# Patient Record
Sex: FEMALE | Race: WHITE | NOT HISPANIC OR LATINO | Employment: FULL TIME | URBAN - METROPOLITAN AREA
[De-identification: names, ages, dates, MRNs, and addresses within clinical notes are randomized per-mention and may not be internally consistent; named-entity substitution may affect disease eponyms.]

---

## 2017-01-27 ENCOUNTER — APPOINTMENT (EMERGENCY)
Dept: RADIOLOGY | Facility: HOSPITAL | Age: 47
End: 2017-01-27
Payer: COMMERCIAL

## 2017-01-27 ENCOUNTER — HOSPITAL ENCOUNTER (EMERGENCY)
Facility: HOSPITAL | Age: 47
Discharge: HOME | End: 2017-01-28
Attending: EMERGENCY MEDICINE | Admitting: EMERGENCY MEDICINE
Payer: COMMERCIAL

## 2017-01-27 DIAGNOSIS — I10 HYPERTENSION: ICD-10-CM

## 2017-01-27 DIAGNOSIS — M54.2 NECK PAIN: Primary | ICD-10-CM

## 2017-01-27 LAB
ALBUMIN SERPL BCP-MCNC: 3.4 G/DL (ref 3.5–5)
ALP SERPL-CCNC: 92 U/L (ref 46–116)
ALT SERPL W P-5'-P-CCNC: 26 U/L (ref 12–78)
ANION GAP SERPL CALCULATED.3IONS-SCNC: 9 MMOL/L (ref 4–13)
AST SERPL W P-5'-P-CCNC: 17 U/L (ref 5–45)
BASOPHILS # BLD AUTO: 0.07 THOUSANDS/ΜL (ref 0–0.1)
BASOPHILS NFR BLD AUTO: 1 % (ref 0–1)
BILIRUB SERPL-MCNC: 0.4 MG/DL (ref 0.2–1)
BUN SERPL-MCNC: 10 MG/DL (ref 5–25)
CALCIUM SERPL-MCNC: 8.7 MG/DL (ref 8.3–10.1)
CHLORIDE SERPL-SCNC: 102 MMOL/L (ref 100–108)
CK SERPL-CCNC: 90 U/L (ref 26–192)
CO2 SERPL-SCNC: 27 MMOL/L (ref 21–32)
CREAT SERPL-MCNC: 0.82 MG/DL (ref 0.6–1.3)
EOSINOPHIL # BLD AUTO: 0.24 THOUSAND/ΜL (ref 0–0.61)
EOSINOPHIL NFR BLD AUTO: 3 % (ref 0–6)
ERYTHROCYTE [DISTWIDTH] IN BLOOD BY AUTOMATED COUNT: 13.2 % (ref 11.6–15.1)
GFR SERPL CREATININE-BSD FRML MDRD: >60 ML/MIN/1.73SQ M
GLUCOSE SERPL-MCNC: 101 MG/DL (ref 65–140)
HCT VFR BLD AUTO: 42.4 % (ref 34.8–46.1)
HGB BLD-MCNC: 14 G/DL (ref 11.5–15.4)
LYMPHOCYTES # BLD AUTO: 2.4 THOUSANDS/ΜL (ref 0.6–4.47)
LYMPHOCYTES NFR BLD AUTO: 28 % (ref 14–44)
MCH RBC QN AUTO: 28.5 PG (ref 26.8–34.3)
MCHC RBC AUTO-ENTMCNC: 33 G/DL (ref 31.4–37.4)
MCV RBC AUTO: 86 FL (ref 82–98)
MONOCYTES # BLD AUTO: 0.91 THOUSAND/ΜL (ref 0.17–1.22)
MONOCYTES NFR BLD AUTO: 10 % (ref 4–12)
NEUTROPHILS # BLD AUTO: 5.09 THOUSANDS/ΜL (ref 1.85–7.62)
NEUTS SEG NFR BLD AUTO: 58 % (ref 43–75)
PLATELET # BLD AUTO: 233 THOUSANDS/UL (ref 149–390)
PMV BLD AUTO: 11.5 FL (ref 8.9–12.7)
POTASSIUM SERPL-SCNC: 3.7 MMOL/L (ref 3.5–5.3)
PROT SERPL-MCNC: 7.7 G/DL (ref 6.4–8.2)
RBC # BLD AUTO: 4.92 MILLION/UL (ref 3.81–5.12)
SODIUM SERPL-SCNC: 138 MMOL/L (ref 136–145)
TROPONIN I SERPL-MCNC: <0.02 NG/ML
WBC # BLD AUTO: 8.71 THOUSAND/UL (ref 4.31–10.16)

## 2017-01-27 PROCEDURE — 80053 COMPREHEN METABOLIC PANEL: CPT | Performed by: EMERGENCY MEDICINE

## 2017-01-27 PROCEDURE — 93005 ELECTROCARDIOGRAM TRACING: CPT | Performed by: EMERGENCY MEDICINE

## 2017-01-27 PROCEDURE — 85025 COMPLETE CBC W/AUTO DIFF WBC: CPT | Performed by: EMERGENCY MEDICINE

## 2017-01-27 PROCEDURE — 71020 HB CHEST X-RAY 2VW FRONTAL&LATL: CPT

## 2017-01-27 PROCEDURE — 72040 X-RAY EXAM NECK SPINE 2-3 VW: CPT

## 2017-01-27 PROCEDURE — 96375 TX/PRO/DX INJ NEW DRUG ADDON: CPT

## 2017-01-27 PROCEDURE — 36415 COLL VENOUS BLD VENIPUNCTURE: CPT | Performed by: EMERGENCY MEDICINE

## 2017-01-27 PROCEDURE — 84702 CHORIONIC GONADOTROPIN TEST: CPT | Performed by: EMERGENCY MEDICINE

## 2017-01-27 PROCEDURE — 82550 ASSAY OF CK (CPK): CPT | Performed by: EMERGENCY MEDICINE

## 2017-01-27 PROCEDURE — 96374 THER/PROPH/DIAG INJ IV PUSH: CPT

## 2017-01-27 PROCEDURE — 84484 ASSAY OF TROPONIN QUANT: CPT | Performed by: EMERGENCY MEDICINE

## 2017-01-27 RX ORDER — LABETALOL HYDROCHLORIDE 5 MG/ML
10 INJECTION, SOLUTION INTRAVENOUS ONCE
Status: COMPLETED | OUTPATIENT
Start: 2017-01-27 | End: 2017-01-27

## 2017-01-27 RX ORDER — ONDANSETRON 2 MG/ML
4 INJECTION INTRAMUSCULAR; INTRAVENOUS ONCE
Status: COMPLETED | OUTPATIENT
Start: 2017-01-27 | End: 2017-01-27

## 2017-01-27 RX ADMIN — HYDROMORPHONE HYDROCHLORIDE 0.5 MG: 1 INJECTION, SOLUTION INTRAMUSCULAR; INTRAVENOUS; SUBCUTANEOUS at 23:18

## 2017-01-27 RX ADMIN — LABETALOL HYDROCHLORIDE 10 MG: 5 INJECTION, SOLUTION INTRAVENOUS at 23:23

## 2017-01-27 RX ADMIN — ONDANSETRON 4 MG: 2 INJECTION INTRAMUSCULAR; INTRAVENOUS at 23:18

## 2017-01-28 VITALS
WEIGHT: 200 LBS | SYSTOLIC BLOOD PRESSURE: 144 MMHG | TEMPERATURE: 98.2 F | DIASTOLIC BLOOD PRESSURE: 82 MMHG | OXYGEN SATURATION: 94 % | HEART RATE: 68 BPM | RESPIRATION RATE: 16 BRPM

## 2017-01-28 LAB — B-HCG SERPL-ACNC: <2 MIU/ML

## 2017-01-28 PROCEDURE — 99284 EMERGENCY DEPT VISIT MOD MDM: CPT

## 2017-01-28 RX ORDER — METOCLOPRAMIDE 10 MG/1
10 TABLET ORAL 4 TIMES DAILY
Qty: 28 TABLET | Refills: 0 | Status: SHIPPED | OUTPATIENT
Start: 2017-01-28 | End: 2017-02-04

## 2017-01-28 RX ORDER — OXYCODONE HYDROCHLORIDE AND ACETAMINOPHEN 5; 325 MG/1; MG/1
1 TABLET ORAL EVERY 4 HOURS PRN
Qty: 20 TABLET | Refills: 0 | Status: SHIPPED | OUTPATIENT
Start: 2017-01-28 | End: 2017-02-07

## 2017-01-28 RX ORDER — AMLODIPINE BESYLATE 2.5 MG/1
2.5 TABLET ORAL DAILY
Qty: 20 TABLET | Refills: 0 | Status: SHIPPED | OUTPATIENT
Start: 2017-01-28 | End: 2020-09-15

## 2017-01-28 RX ORDER — DIAZEPAM 5 MG/1
5 TABLET ORAL EVERY 8 HOURS PRN
Qty: 15 TABLET | Refills: 0 | Status: SHIPPED | OUTPATIENT
Start: 2017-01-28 | End: 2020-09-15 | Stop reason: ALTCHOICE

## 2017-01-30 LAB
ATRIAL RATE: 93 BPM
P AXIS: 69 DEGREES
PR INTERVAL: 144 MS
QRS AXIS: -25 DEGREES
QRSD INTERVAL: 76 MS
QT INTERVAL: 368 MS
QTC INTERVAL: 457 MS
T WAVE AXIS: 44 DEGREES
VENTRICULAR RATE: 93 BPM

## 2018-05-13 ENCOUNTER — HOSPITAL ENCOUNTER (INPATIENT)
Facility: HOSPITAL | Age: 48
LOS: 3 days | Discharge: HOME | DRG: 392 | End: 2018-05-16
Attending: SURGERY | Admitting: SURGERY
Payer: COMMERCIAL

## 2018-05-13 ENCOUNTER — APPOINTMENT (EMERGENCY)
Dept: CT IMAGING | Facility: HOSPITAL | Age: 48
DRG: 392 | End: 2018-05-13
Payer: COMMERCIAL

## 2018-05-13 DIAGNOSIS — K57.20 PERFORATION OF SIGMOID COLON DUE TO DIVERTICULITIS: ICD-10-CM

## 2018-05-13 DIAGNOSIS — R65.10 SIRS (SYSTEMIC INFLAMMATORY RESPONSE SYNDROME) (HCC): Primary | ICD-10-CM

## 2018-05-13 DIAGNOSIS — K57.92 ACUTE DIVERTICULITIS: ICD-10-CM

## 2018-05-13 LAB
ALBUMIN SERPL BCP-MCNC: 3 G/DL (ref 3.5–5)
ALP SERPL-CCNC: 97 U/L (ref 46–116)
ALT SERPL W P-5'-P-CCNC: 27 U/L (ref 12–78)
ANION GAP SERPL CALCULATED.3IONS-SCNC: 8 MMOL/L (ref 4–13)
APTT PPP: 37 SECONDS (ref 23–35)
AST SERPL W P-5'-P-CCNC: 13 U/L (ref 5–45)
BACTERIA UR QL AUTO: ABNORMAL /HPF
BASOPHILS # BLD AUTO: 0.04 THOUSANDS/ΜL (ref 0–0.1)
BASOPHILS NFR BLD AUTO: 0 % (ref 0–1)
BILIRUB SERPL-MCNC: 0.6 MG/DL (ref 0.2–1)
BILIRUB UR QL STRIP: ABNORMAL
BUN SERPL-MCNC: 7 MG/DL (ref 5–25)
CALCIUM SERPL-MCNC: 8.6 MG/DL (ref 8.3–10.1)
CHLORIDE SERPL-SCNC: 102 MMOL/L (ref 100–108)
CLARITY UR: ABNORMAL
CO2 SERPL-SCNC: 27 MMOL/L (ref 21–32)
COLOR UR: YELLOW
CREAT SERPL-MCNC: 0.71 MG/DL (ref 0.6–1.3)
EOSINOPHIL # BLD AUTO: 0.03 THOUSAND/ΜL (ref 0–0.61)
EOSINOPHIL NFR BLD AUTO: 0 % (ref 0–6)
ERYTHROCYTE [DISTWIDTH] IN BLOOD BY AUTOMATED COUNT: 13.9 % (ref 11.6–15.1)
EXT PREG TEST URINE: NEGATIVE
FINE GRAN CASTS URNS QL MICRO: ABNORMAL /LPF
GFR SERPL CREATININE-BSD FRML MDRD: 101 ML/MIN/1.73SQ M
GLUCOSE SERPL-MCNC: 108 MG/DL (ref 65–140)
GLUCOSE UR STRIP-MCNC: NEGATIVE MG/DL
HCT VFR BLD AUTO: 40.8 % (ref 34.8–46.1)
HGB BLD-MCNC: 13.3 G/DL (ref 11.5–15.4)
HGB UR QL STRIP.AUTO: ABNORMAL
INR PPP: 1.06 (ref 0.86–1.16)
KETONES UR STRIP-MCNC: ABNORMAL MG/DL
LACTATE SERPL-SCNC: 0.7 MMOL/L (ref 0.5–2)
LEUKOCYTE ESTERASE UR QL STRIP: NEGATIVE
LIPASE SERPL-CCNC: 57 U/L (ref 73–393)
LYMPHOCYTES # BLD AUTO: 1.33 THOUSANDS/ΜL (ref 0.6–4.47)
LYMPHOCYTES NFR BLD AUTO: 9 % (ref 14–44)
MCH RBC QN AUTO: 27.5 PG (ref 26.8–34.3)
MCHC RBC AUTO-ENTMCNC: 32.6 G/DL (ref 31.4–37.4)
MCV RBC AUTO: 84 FL (ref 82–98)
MONOCYTES # BLD AUTO: 1.22 THOUSAND/ΜL (ref 0.17–1.22)
MONOCYTES NFR BLD AUTO: 8 % (ref 4–12)
MUCOUS THREADS UR QL AUTO: ABNORMAL
NEUTROPHILS # BLD AUTO: 12.51 THOUSANDS/ΜL (ref 1.85–7.62)
NEUTS SEG NFR BLD AUTO: 83 % (ref 43–75)
NITRITE UR QL STRIP: NEGATIVE
NON-SQ EPI CELLS URNS QL MICRO: ABNORMAL /HPF
PH UR STRIP.AUTO: 5.5 [PH] (ref 4.5–8)
PLATELET # BLD AUTO: 246 THOUSANDS/UL (ref 149–390)
PMV BLD AUTO: 10.5 FL (ref 8.9–12.7)
POTASSIUM SERPL-SCNC: 3.2 MMOL/L (ref 3.5–5.3)
PROT SERPL-MCNC: 7.7 G/DL (ref 6.4–8.2)
PROT UR STRIP-MCNC: ABNORMAL MG/DL
PROTHROMBIN TIME: 14.2 SECONDS (ref 12.1–14.4)
RBC # BLD AUTO: 4.84 MILLION/UL (ref 3.81–5.12)
RBC #/AREA URNS AUTO: ABNORMAL /HPF
SODIUM SERPL-SCNC: 137 MMOL/L (ref 136–145)
SP GR UR STRIP.AUTO: 1.02 (ref 1–1.03)
UROBILINOGEN UR QL STRIP.AUTO: 0.2 E.U./DL
WBC # BLD AUTO: 15.13 THOUSAND/UL (ref 4.31–10.16)
WBC #/AREA URNS AUTO: ABNORMAL /HPF

## 2018-05-13 PROCEDURE — 83605 ASSAY OF LACTIC ACID: CPT | Performed by: PHYSICIAN ASSISTANT

## 2018-05-13 PROCEDURE — 85610 PROTHROMBIN TIME: CPT | Performed by: PHYSICIAN ASSISTANT

## 2018-05-13 PROCEDURE — 81025 URINE PREGNANCY TEST: CPT | Performed by: PHYSICIAN ASSISTANT

## 2018-05-13 PROCEDURE — 85025 COMPLETE CBC W/AUTO DIFF WBC: CPT | Performed by: PHYSICIAN ASSISTANT

## 2018-05-13 PROCEDURE — 85730 THROMBOPLASTIN TIME PARTIAL: CPT | Performed by: PHYSICIAN ASSISTANT

## 2018-05-13 PROCEDURE — 99285 EMERGENCY DEPT VISIT HI MDM: CPT

## 2018-05-13 PROCEDURE — 96365 THER/PROPH/DIAG IV INF INIT: CPT

## 2018-05-13 PROCEDURE — 80053 COMPREHEN METABOLIC PANEL: CPT | Performed by: PHYSICIAN ASSISTANT

## 2018-05-13 PROCEDURE — 36415 COLL VENOUS BLD VENIPUNCTURE: CPT | Performed by: PHYSICIAN ASSISTANT

## 2018-05-13 PROCEDURE — 83690 ASSAY OF LIPASE: CPT | Performed by: PHYSICIAN ASSISTANT

## 2018-05-13 PROCEDURE — 81001 URINALYSIS AUTO W/SCOPE: CPT

## 2018-05-13 PROCEDURE — 74177 CT ABD & PELVIS W/CONTRAST: CPT

## 2018-05-13 PROCEDURE — 96361 HYDRATE IV INFUSION ADD-ON: CPT

## 2018-05-13 PROCEDURE — 87040 BLOOD CULTURE FOR BACTERIA: CPT | Performed by: PHYSICIAN ASSISTANT

## 2018-05-13 RX ORDER — NICOTINE 21 MG/24HR
14 PATCH, TRANSDERMAL 24 HOURS TRANSDERMAL DAILY
Status: DISCONTINUED | OUTPATIENT
Start: 2018-05-13 | End: 2018-05-16 | Stop reason: HOSPADM

## 2018-05-13 RX ORDER — POTASSIUM CHLORIDE 20 MEQ/1
40 TABLET, EXTENDED RELEASE ORAL ONCE
Status: COMPLETED | OUTPATIENT
Start: 2018-05-13 | End: 2018-05-13

## 2018-05-13 RX ORDER — LEVOFLOXACIN 5 MG/ML
750 INJECTION, SOLUTION INTRAVENOUS ONCE
Status: COMPLETED | OUTPATIENT
Start: 2018-05-13 | End: 2018-05-13

## 2018-05-13 RX ORDER — SODIUM CHLORIDE 9 MG/ML
125 INJECTION, SOLUTION INTRAVENOUS CONTINUOUS
Status: DISCONTINUED | OUTPATIENT
Start: 2018-05-13 | End: 2018-05-13

## 2018-05-13 RX ORDER — AMLODIPINE BESYLATE 2.5 MG/1
2.5 TABLET ORAL DAILY
Status: DISCONTINUED | OUTPATIENT
Start: 2018-05-13 | End: 2018-05-14

## 2018-05-13 RX ORDER — ONDANSETRON 2 MG/ML
4 INJECTION INTRAMUSCULAR; INTRAVENOUS EVERY 4 HOURS PRN
Status: DISCONTINUED | OUTPATIENT
Start: 2018-05-13 | End: 2018-05-16 | Stop reason: HOSPADM

## 2018-05-13 RX ORDER — MORPHINE SULFATE 4 MG/ML
4 INJECTION, SOLUTION INTRAMUSCULAR; INTRAVENOUS
Status: DISCONTINUED | OUTPATIENT
Start: 2018-05-13 | End: 2018-05-16 | Stop reason: HOSPADM

## 2018-05-13 RX ORDER — SODIUM CHLORIDE, SODIUM LACTATE, POTASSIUM CHLORIDE, CALCIUM CHLORIDE 600; 310; 30; 20 MG/100ML; MG/100ML; MG/100ML; MG/100ML
75 INJECTION, SOLUTION INTRAVENOUS CONTINUOUS
Status: DISCONTINUED | OUTPATIENT
Start: 2018-05-13 | End: 2018-05-16

## 2018-05-13 RX ORDER — ACETAMINOPHEN 325 MG/1
650 TABLET ORAL EVERY 6 HOURS PRN
Status: DISCONTINUED | OUTPATIENT
Start: 2018-05-13 | End: 2018-05-16 | Stop reason: HOSPADM

## 2018-05-13 RX ORDER — LEVOFLOXACIN 5 MG/ML
750 INJECTION, SOLUTION INTRAVENOUS EVERY 24 HOURS
Status: DISCONTINUED | OUTPATIENT
Start: 2018-05-14 | End: 2018-05-16 | Stop reason: HOSPADM

## 2018-05-13 RX ORDER — ONDANSETRON 2 MG/ML
4 INJECTION INTRAMUSCULAR; INTRAVENOUS EVERY 4 HOURS PRN
Status: DISCONTINUED | OUTPATIENT
Start: 2018-05-13 | End: 2018-05-13 | Stop reason: SDUPTHER

## 2018-05-13 RX ADMIN — SODIUM CHLORIDE, SODIUM LACTATE, POTASSIUM CHLORIDE, AND CALCIUM CHLORIDE 125 ML/HR: .6; .31; .03; .02 INJECTION, SOLUTION INTRAVENOUS at 21:44

## 2018-05-13 RX ADMIN — IOHEXOL 100 ML: 350 INJECTION, SOLUTION INTRAVENOUS at 10:45

## 2018-05-13 RX ADMIN — MORPHINE SULFATE 2 MG: 4 INJECTION, SOLUTION INTRAMUSCULAR; INTRAVENOUS at 15:17

## 2018-05-13 RX ADMIN — POTASSIUM CHLORIDE 40 MEQ: 1500 TABLET, EXTENDED RELEASE ORAL at 11:01

## 2018-05-13 RX ADMIN — SODIUM CHLORIDE, SODIUM LACTATE, POTASSIUM CHLORIDE, AND CALCIUM CHLORIDE 125 ML/HR: .6; .31; .03; .02 INJECTION, SOLUTION INTRAVENOUS at 14:31

## 2018-05-13 RX ADMIN — LEVOFLOXACIN 750 MG: 5 INJECTION, SOLUTION INTRAVENOUS at 13:34

## 2018-05-13 RX ADMIN — METRONIDAZOLE 500 MG: 500 INJECTION, SOLUTION INTRAVENOUS at 21:41

## 2018-05-13 RX ADMIN — SODIUM CHLORIDE 1000 ML: 0.9 INJECTION, SOLUTION INTRAVENOUS at 12:14

## 2018-05-13 RX ADMIN — MORPHINE SULFATE 2 MG: 4 INJECTION, SOLUTION INTRAMUSCULAR; INTRAVENOUS at 19:52

## 2018-05-13 RX ADMIN — METRONIDAZOLE 500 MG: 500 INJECTION, SOLUTION INTRAVENOUS at 11:43

## 2018-05-13 RX ADMIN — SODIUM CHLORIDE 1000 ML: 0.9 INJECTION, SOLUTION INTRAVENOUS at 09:54

## 2018-05-13 NOTE — ED PROVIDER NOTES
History  Chief Complaint   Patient presents with    Abdominal Pain     c/o abd pain over a week, lower quads pelvic -No N/V/D- states no relief  Treated for fibroids last year  Pain unrelenting nothing makes it better, taking ibuprofen around the clock - took 1000 mg this AM @7 AM       History provided by:  Patient  Abdominal Pain   Pain location:  Periumbilical  Pain radiation: lower abdomen  Pain severity:  Moderate  Onset quality:  Gradual  Duration:  2 days  Timing:  Constant  Progression:  Waxing and waning  Chronicity:  New  Context: not alcohol use, not awakening from sleep, not diet changes, not eating, not laxative use, not medication withdrawal, not previous surgeries, not recent illness, not recent sexual activity, not recent travel, not retching, not sick contacts, not suspicious food intake and not trauma    Relieved by:  None tried  Worsened by: Movement, palpation and position changes  Ineffective treatments:  None tried  Associated symptoms: anorexia and chills    Associated symptoms: no belching, no chest pain, no constipation, no cough, no diarrhea, no dysuria, no fatigue, no fever, no flatus, no hematemesis, no hematochezia, no hematuria, no melena, no nausea, no shortness of breath, no sore throat, no vaginal bleeding, no vaginal discharge and no vomiting    Risk factors: obesity    Risk factors: no alcohol abuse, no aspirin use, not elderly, has not had multiple surgeries, no NSAID use, not pregnant and no recent hospitalization        Prior to Admission Medications   Prescriptions Last Dose Informant Patient Reported? Taking?    amLODIPine (NORVASC) 2 5 mg tablet   No Yes   Sig: Take 1 tablet by mouth daily for 20 days   diazepam (VALIUM) 5 mg tablet   No Yes   Sig: Take 1 tablet by mouth every 8 (eight) hours as needed for muscle spasms for up to 10 days      Facility-Administered Medications: None       Past Medical History:   Diagnosis Date    Fibroids     Hypertension        Past Surgical History:   Procedure Laterality Date    TUBAL LIGATION  1991       History reviewed  No pertinent family history  I have reviewed and agree with the history as documented  Social History   Substance Use Topics    Smoking status: Current Every Day Smoker    Smokeless tobacco: Never Used    Alcohol use Yes      Comment: socially        Review of Systems   Constitutional: Positive for activity change, appetite change and chills  Negative for diaphoresis, fatigue and fever  HENT: Negative for congestion, dental problem, ear discharge, facial swelling, hearing loss, postnasal drip, rhinorrhea, sinus pain, sinus pressure, sore throat and trouble swallowing  Eyes: Negative for pain, discharge, redness and itching  Respiratory: Negative for cough, chest tightness and shortness of breath  Cardiovascular: Negative for chest pain  Gastrointestinal: Positive for abdominal pain and anorexia  Negative for constipation, diarrhea, flatus, hematemesis, hematochezia, melena, nausea and vomiting  Endocrine: Negative for cold intolerance, heat intolerance, polydipsia, polyphagia and polyuria  Genitourinary: Negative for difficulty urinating, dysuria, frequency, hematuria, urgency, vaginal bleeding and vaginal discharge  Musculoskeletal: Negative for arthralgias, back pain, gait problem, joint swelling, myalgias, neck pain and neck stiffness  Skin: Negative for color change, pallor, rash and wound  Neurological: Negative for weakness and headaches  Hematological: Negative for adenopathy  Does not bruise/bleed easily  Psychiatric/Behavioral: Negative for confusion         Physical Exam  ED Triage Vitals [05/13/18 0849]   Temperature Pulse Respirations Blood Pressure SpO2   98 7 °F (37 1 °C) 95 20 153/74 98 %      Temp Source Heart Rate Source Patient Position - Orthostatic VS BP Location FiO2 (%)   Oral Monitor Sitting Right arm --      Pain Score       9           Orthostatic Vital Signs  Vitals:    05/13/18 0849 05/13/18 1052 05/13/18 1248 05/13/18 1324   BP: 153/74 111/56 152/81 139/79   Pulse: 95 92 90 88   Patient Position - Orthostatic VS: Sitting Lying Sitting Sitting       Physical Exam   Constitutional: She is oriented to person, place, and time  She appears well-developed and well-nourished  No distress  HENT:   Head: Normocephalic  Right Ear: External ear normal    Left Ear: External ear normal    Nose: Nose normal    Mouth/Throat: Oropharynx is clear and moist    Eyes: Conjunctivae are normal  Right eye exhibits no discharge  Left eye exhibits no discharge  Neck: Neck supple  No JVD present  No tracheal deviation present  No thyromegaly present  Cardiovascular: Normal rate, regular rhythm, normal heart sounds and intact distal pulses  Exam reveals no gallop and no friction rub  No murmur heard  Pulmonary/Chest: Effort normal and breath sounds normal  No stridor  No respiratory distress  She has no wheezes  She has no rales  She exhibits no tenderness  Abdominal: There is tenderness  There is guarding  There is no rebound  No hernia  Musculoskeletal: She exhibits no edema or tenderness  Lymphadenopathy:     She has no cervical adenopathy  Neurological: She is alert and oriented to person, place, and time  Skin: Skin is warm  Capillary refill takes less than 2 seconds  No rash noted  She is not diaphoretic  No erythema  Psychiatric: She has a normal mood and affect  Her behavior is normal  Judgment and thought content normal    Nursing note and vitals reviewed        ED Medications  Medications   amLODIPine (NORVASC) tablet 2 5 mg (2 5 mg Oral Not Given 5/13/18 1421)   lactated ringers infusion (125 mL/hr Intravenous New Bag 5/13/18 1431)   ondansetron (ZOFRAN) injection 4 mg (not administered)   enoxaparin (LOVENOX) subcutaneous injection 40 mg (40 mg Subcutaneous Not Given 5/13/18 1422)   levofloxacin (LEVAQUIN) IVPB (premix) 750 mg (not administered) metroNIDAZOLE (FLAGYL) IVPB (premix) 500 mg ( Intravenous Canceled Entry 5/13/18 1422)   morphine (PF) 4 mg/mL injection 4 mg (not administered)   acetaminophen (TYLENOL) tablet 650 mg (not administered)   nicotine (NICODERM CQ) 14 mg/24hr TD 24 hr patch 14 mg (14 mg Transdermal Not Given 5/13/18 1224)   sodium chloride 0 9 % bolus 1,000 mL (0 mL Intravenous Stopped 5/13/18 1213)   iohexol (OMNIPAQUE) 350 MG/ML injection (MULTI-DOSE) 100 mL (100 mL Intravenous Given 5/13/18 1045)   potassium chloride (K-DUR,KLOR-CON) CR tablet 40 mEq (40 mEq Oral Given 5/13/18 1101)   sodium chloride 0 9 % bolus 1,000 mL (1,000 mL Intravenous New Bag 5/13/18 1214)   levofloxacin (LEVAQUIN) IVPB (premix) 750 mg (750 mg Intravenous New Bag 5/13/18 1334)   metroNIDAZOLE (FLAGYL) IVPB (premix) 500 mg (500 mg Intravenous New Bag 5/13/18 1143)       Diagnostic Studies  Results Reviewed     Procedure Component Value Units Date/Time    Blood culture [[id number]] Collected:  05/13/18 1119    Lab Status: In process Specimen:  Blood from Arm, Left Updated:  05/13/18 1140    Blood culture [[id number]] Collected:  05/13/18 0955    Lab Status:   In process Specimen:  Blood from Arm, Right Updated:  05/13/18 1140    Urine Microscopic [[id number]]  (Abnormal) Collected:  05/13/18 1003    Lab Status:  Final result Specimen:  Urine from Urine, Clean Catch Updated:  05/13/18 1037     RBC, UA 10-20 (A) /hpf      WBC, UA 1-2 (A) /hpf      Epithelial Cells Occasional /hpf      Bacteria, UA Moderate (A) /hpf      Fine granular casts 2-3 /lpf      MUCOUS THREADS Occasional (A)    Protime-INR [[id number]]  (Normal) Collected:  05/13/18 0954    Lab Status:  Final result Specimen:  Blood from Arm, Right Updated:  05/13/18 1035     Protime 14 2 seconds      INR 1 06    APTT [[id number]]  (Abnormal) Collected:  05/13/18 0954    Lab Status:  Final result Specimen:  Blood from Arm, Right Updated:  05/13/18 1035     PTT 37 (H) seconds     Comprehensive metabolic panel [[id number]]  (Abnormal) Collected:  05/13/18 0954    Lab Status:  Final result Specimen:  Blood from Arm, Right Updated:  05/13/18 1031     Sodium 137 mmol/L      Potassium 3 2 (L) mmol/L      Chloride 102 mmol/L      CO2 27 mmol/L      Anion Gap 8 mmol/L      BUN 7 mg/dL      Creatinine 0 71 mg/dL      Glucose 108 mg/dL      Calcium 8 6 mg/dL      AST 13 U/L      ALT 27 U/L      Alkaline Phosphatase 97 U/L      Total Protein 7 7 g/dL      Albumin 3 0 (L) g/dL      Total Bilirubin 0 60 mg/dL      eGFR 101 ml/min/1 73sq m     Narrative:         National Kidney Disease Education Program recommendations are as follows:  GFR calculation is accurate only with a steady state creatinine  Chronic Kidney disease less than 60 ml/min/1 73 sq  meters  Kidney failure less than 15 ml/min/1 73 sq  meters  Lipase [[id number]]  (Abnormal) Collected:  05/13/18 0954    Lab Status:  Final result Specimen:  Blood from Arm, Right Updated:  05/13/18 1031     Lipase 57 (L) u/L     Lactic acid, plasma [[id number]]  (Normal) Collected:  05/13/18 0954    Lab Status:  Final result Specimen:  Blood from Arm, Right Updated:  05/13/18 1024     LACTIC ACID 0 7 mmol/L     Narrative:         Result may be elevated if tourniquet was used during collection      CBC and differential [[id number]]  (Abnormal) Collected:  05/13/18 0954    Lab Status:  Final result Specimen:  Blood from Arm, Right Updated:  05/13/18 1005     WBC 15 13 (H) Thousand/uL      RBC 4 84 Million/uL      Hemoglobin 13 3 g/dL      Hematocrit 40 8 %      MCV 84 fL      MCH 27 5 pg      MCHC 32 6 g/dL      RDW 13 9 %      MPV 10 5 fL      Platelets 948 Thousands/uL      Neutrophils Relative 83 (H) %      Lymphocytes Relative 9 (L) %      Monocytes Relative 8 %      Eosinophils Relative 0 %      Basophils Relative 0 %      Neutrophils Absolute 12 51 (H) Thousands/µL      Lymphocytes Absolute 1 33 Thousands/µL      Monocytes Absolute 1 22 Thousand/µL      Eosinophils Absolute 0 03 Thousand/µL      Basophils Absolute 0 04 Thousands/µL     POCT pregnancy, urine [[id number]]  (Normal) Resulted:  05/13/18 1003    Lab Status:  Final result Updated:  05/13/18 1003     EXT PREG TEST UR (Ref: Negative) negative    ED Urine Macroscopic [[id number]]  (Abnormal) Collected:  05/13/18 1003    Lab Status:  Final result Specimen:  Urine Updated:  05/13/18 1001     Color, UA Yellow     Clarity, UA Slightly Cloudy     pH, UA 5 5     Leukocytes, UA Negative     Nitrite, UA Negative     Protein, UA 30 (1+) (A) mg/dl      Glucose, UA Negative mg/dl      Ketones, UA >=160 (4+) (A) mg/dl      Urobilinogen, UA 0 2 E U /dl      Bilirubin, UA Interference- unable to analyze (A)     Blood, UA Large (A)     Specific [place], UA 1 025    Narrative:       CLINITEK RESULT                 CT abdomen pelvis with contrast   Final Result by Xenia Santiago MD (05/13 1102)      Sigmoid diverticulitis with extraluminal air in the area of inflammation, consistent with perforation  There is no fluid collection  I personally discussed this study with Zak Velasco on 5/13/2018 at 11:02 AM                Workstation performed: [id number]                    Procedures  Procedures       Phone Contacts  ED Phone Contact    ED Course  ED Course as of May 13 1505   Sun May 13, 2018   1105 Spoke to Radiologist-CT demonstrated perforated diverticulitis  No fluid collection  Initial Sepsis Screening     Row Name 05/13/18 1043                Is the patient's history suggestive of a new or worsening infection? (!)  Yes (Proceed)  -JG        Suspected source of infection acute abdominal infection  -JG        Are two or more of the following signs & symptoms of infection both present and new to the patient?  (!)  Yes (Proceed)  -JG        Indicate SIRS criteria Tachycardia > 90 bpm;Leukocytosis (WBC > 51955 IJL)  -JG        If the answer is yes to both questions, suspicion of sepsis is present  [employer] If severe sepsis is present AND tissue hypoperfusion perists in the hour after fluid resuscitation or lactate > 4, the patient meets criteria for SEPTIC SHOCK          Are any of the following organ dysfunction criteria present within 6 hours of suspected infection and SIRS criteria that are NOT considered to be chronic conditions? No  -JG        Organ dysfunction          Date of presentation of severe sepsis          Time of presentation of severe sepsis          Tissue hypoperfusion persists in the hour after crystalloid fluid administration, evidenced, by either:          Was hypotension present within one hour of the conclusion of crystalloid fluid administration?         Date of presentation of septic shock          Time of presentation of septic shock            User Key  (r) = Recorded By, (t) = Taken By, (c) = Cosigned By    [address] Name Provider Type    [address] Gandara PA-C Physician Assistant                  MDM  Number of Diagnoses or Management Options  Acute diverticulitis: new and requires workup  [facility]) [facility] new and requires workup     Amount and/or Complexity of Data Reviewed  Clinical lab tests: ordered and reviewed  Tests in the radiology section of CPT®: ordered and reviewed  Tests in the medicine section of CPT®: ordered and reviewed    Risk of Complications, Morbidity, and/or Mortality  Presenting problems: high  Diagnostic procedures: high  Management options: high  General comments: Patient presents emergency room with complaints of a abdominal pain and cramping  She was seen and evaluated  She had tenderness and guarding diffusely throughout her abdomen  Patient had a 15 a 1000 white count  Her lactic was 0 7  She met SIRS criteria but not acute sepsis  She had a CT scan that demonstrated diverticulitis with a micro perforation  She was admitted to Dr Alexandru Manriquez  service from surgery      Patient Progress  Patient progress: stable    CritCare Time    Disposition  Final diagnoses:   SIRS (systemic inflammatory response syndrome) (HCC)   Acute diverticulitis - Micro perforation     Time reflects when diagnosis was documented in both MDM as applicable and the Disposition within this note     Time User Action Codes Description Comment    5/13/2018 11:05 AM Minor Neigh Add [R65 10] SIRS (systemic inflammatory response syndrome) ([facility] )     5/13/2018 12:01 PM Minor Neigh Add [K57 92] Acute diverticulitis     5/13/2018 12:01 PM Minor Neigh Modify [K57 92] Acute diverticulitis Micro perforation      ED Disposition     ED Disposition Condition Comment    Admit  Case was discussed with Dr Kristen Blanco and the patient's admission status was agreed to be Admission Status: inpatient status to the service of Dr Kristen Blanco   Follow-up Information    None       Current Discharge Medication List      CONTINUE these medications which have NOT CHANGED    Details   amLODIPine (NORVASC) 2 5 mg tablet Take 1 tablet by mouth daily for 20 days  Qty: 20 tablet, Refills: 0      diazepam (VALIUM) 5 mg tablet Take 1 tablet by mouth every 8 (eight) hours as needed for muscle spasms for up to 10 days  Qty: 15 tablet, Refills: 0           No discharge procedures on file      ED Provider  Electronically Signed by           Kev Landis PA-C  05/13/18 2420

## 2018-05-13 NOTE — SEPSIS NOTE
Sepsis Note   Madison Height 50 y o  female MRN: [id number]  Unit/Bed#: ED 11 Encounter: [id number]            Initial Sepsis Screening     Row Name 05/13/18 1043                Is the patient's history suggestive of a new or worsening infection? (!)  Yes (Proceed)  -JG        Suspected source of infection acute abdominal infection  -JG        Are two or more of the following signs & symptoms of infection both present and new to the patient? (!)  Yes (Proceed)  -JG        Indicate SIRS criteria Tachycardia > 90 bpm;Leukocytosis (WBC > 86281 IJL)  -JG        If the answer is yes to both questions, suspicion of sepsis is present          If severe sepsis is present AND tissue hypoperfusion perists in the hour after fluid resuscitation or lactate > 4, the patient meets criteria for SEPTIC SHOCK          Are any of the following organ dysfunction criteria present within 6 hours of suspected infection and SIRS criteria that are NOT considered to be chronic conditions? No  -JG        Organ dysfunction          Date of presentation of severe sepsis          Time of presentation of severe sepsis          Tissue hypoperfusion persists in the hour after crystalloid fluid administration, evidenced, by either:          Was hypotension present within one hour of the conclusion of crystalloid fluid administration?           Date of presentation of septic shock          Time of presentation of septic shock            User Key  (r) = Recorded By, (t) = Taken By, (c) = Cosigned By    [address] Name Provider Type    [address] Gandara PA-C Physician Assistant

## 2018-05-14 PROBLEM — K57.20 PERFORATION OF SIGMOID COLON DUE TO DIVERTICULITIS: Status: ACTIVE | Noted: 2018-05-14

## 2018-05-14 LAB
ANION GAP SERPL CALCULATED.3IONS-SCNC: 9 MMOL/L (ref 4–13)
BASOPHILS # BLD AUTO: 0.02 THOUSANDS/ΜL (ref 0–0.1)
BASOPHILS NFR BLD AUTO: 0 % (ref 0–1)
BUN SERPL-MCNC: 5 MG/DL (ref 5–25)
CALCIUM SERPL-MCNC: 7.9 MG/DL (ref 8.3–10.1)
CHLORIDE SERPL-SCNC: 104 MMOL/L (ref 100–108)
CO2 SERPL-SCNC: 24 MMOL/L (ref 21–32)
CREAT SERPL-MCNC: 0.58 MG/DL (ref 0.6–1.3)
EOSINOPHIL # BLD AUTO: 0.03 THOUSAND/ΜL (ref 0–0.61)
EOSINOPHIL NFR BLD AUTO: 0 % (ref 0–6)
ERYTHROCYTE [DISTWIDTH] IN BLOOD BY AUTOMATED COUNT: 13.9 % (ref 11.6–15.1)
GFR SERPL CREATININE-BSD FRML MDRD: 109 ML/MIN/1.73SQ M
GLUCOSE SERPL-MCNC: 84 MG/DL (ref 65–140)
HCT VFR BLD AUTO: 36.8 % (ref 34.8–46.1)
HGB BLD-MCNC: 12.1 G/DL (ref 11.5–15.4)
LYMPHOCYTES # BLD AUTO: 1.41 THOUSANDS/ΜL (ref 0.6–4.47)
LYMPHOCYTES NFR BLD AUTO: 12 % (ref 14–44)
MCH RBC QN AUTO: 27.9 PG (ref 26.8–34.3)
MCHC RBC AUTO-ENTMCNC: 32.9 G/DL (ref 31.4–37.4)
MCV RBC AUTO: 85 FL (ref 82–98)
MONOCYTES # BLD AUTO: 1.17 THOUSAND/ΜL (ref 0.17–1.22)
MONOCYTES NFR BLD AUTO: 10 % (ref 4–12)
NEUTROPHILS # BLD AUTO: 9.66 THOUSANDS/ΜL (ref 1.85–7.62)
NEUTS SEG NFR BLD AUTO: 78 % (ref 43–75)
PLATELET # BLD AUTO: 251 THOUSANDS/UL (ref 149–390)
PMV BLD AUTO: 10.7 FL (ref 8.9–12.7)
POTASSIUM SERPL-SCNC: 3.6 MMOL/L (ref 3.5–5.3)
RBC # BLD AUTO: 4.34 MILLION/UL (ref 3.81–5.12)
SODIUM SERPL-SCNC: 137 MMOL/L (ref 136–145)
WBC # BLD AUTO: 12.29 THOUSAND/UL (ref 4.31–10.16)

## 2018-05-14 PROCEDURE — 85025 COMPLETE CBC W/AUTO DIFF WBC: CPT | Performed by: SURGERY

## 2018-05-14 PROCEDURE — 80048 BASIC METABOLIC PNL TOTAL CA: CPT | Performed by: SURGERY

## 2018-05-14 RX ORDER — AMLODIPINE BESYLATE 5 MG/1
5 TABLET ORAL DAILY
Status: DISCONTINUED | OUTPATIENT
Start: 2018-05-14 | End: 2018-05-16 | Stop reason: HOSPADM

## 2018-05-14 RX ORDER — AMLODIPINE BESYLATE 5 MG/1
5 TABLET ORAL DAILY
Status: DISCONTINUED | OUTPATIENT
Start: 2018-05-15 | End: 2018-05-14

## 2018-05-14 RX ADMIN — SODIUM CHLORIDE, SODIUM LACTATE, POTASSIUM CHLORIDE, AND CALCIUM CHLORIDE 125 ML/HR: .6; .31; .03; .02 INJECTION, SOLUTION INTRAVENOUS at 23:25

## 2018-05-14 RX ADMIN — SODIUM CHLORIDE, SODIUM LACTATE, POTASSIUM CHLORIDE, AND CALCIUM CHLORIDE 125 ML/HR: .6; .31; .03; .02 INJECTION, SOLUTION INTRAVENOUS at 06:12

## 2018-05-14 RX ADMIN — LEVOFLOXACIN 750 MG: 5 INJECTION, SOLUTION INTRAVENOUS at 12:04

## 2018-05-14 RX ADMIN — METRONIDAZOLE 500 MG: 500 INJECTION, SOLUTION INTRAVENOUS at 21:07

## 2018-05-14 RX ADMIN — METRONIDAZOLE 500 MG: 500 INJECTION, SOLUTION INTRAVENOUS at 04:31

## 2018-05-14 RX ADMIN — AMLODIPINE BESYLATE 5 MG: 5 TABLET ORAL at 09:30

## 2018-05-14 RX ADMIN — ONDANSETRON 4 MG: 2 INJECTION INTRAMUSCULAR; INTRAVENOUS at 16:52

## 2018-05-14 RX ADMIN — METRONIDAZOLE 500 MG: 500 INJECTION, SOLUTION INTRAVENOUS at 12:54

## 2018-05-14 RX ADMIN — MORPHINE SULFATE 4 MG: 4 INJECTION, SOLUTION INTRAMUSCULAR; INTRAVENOUS at 00:13

## 2018-05-14 RX ADMIN — MORPHINE SULFATE 2 MG: 4 INJECTION, SOLUTION INTRAMUSCULAR; INTRAVENOUS at 16:52

## 2018-05-14 RX ADMIN — MORPHINE SULFATE 2 MG: 4 INJECTION, SOLUTION INTRAMUSCULAR; INTRAVENOUS at 10:38

## 2018-05-14 RX ADMIN — ENOXAPARIN SODIUM 40 MG: 40 INJECTION SUBCUTANEOUS at 09:03

## 2018-05-14 RX ADMIN — SODIUM CHLORIDE, SODIUM LACTATE, POTASSIUM CHLORIDE, AND CALCIUM CHLORIDE 125 ML/HR: .6; .31; .03; .02 INJECTION, SOLUTION INTRAVENOUS at 15:38

## 2018-05-14 NOTE — H&P
History and Physical - General Surgery  Keshav Estrada 50 y o  female MRN: [id number]  Unit/Bed#: -01 Encounter: [id number]        Assessment/Plan     Assessment:  50 F with acute sigmoid diverticulitis with perforation    Plan:  Trial of clears today  Maintenance IV fluids - D5 1/2 NS @ 100  Replete electrolytes  Continue Levaquin/Flagyl  Serial abdominal exams  OOB and ambulate  Lovenox ppx    History of Present Illness     HPI:  Keshav Estrada is a 50 y o  female who presents with epigastric abdominal pain  She states that it started a few days ago  It was intermittent in nature, but gradually worsened  She was prompted to come to the emergency department by her   She denies any nausea or vomiting  She does report subjective fevers in the evening  She states she has had normal bowel movements  In the ED she underwent a CT scan which showed acute sigmoid diverticulitis with perforation and no collection  She is otherwise relatively healthy, and takes amlodipine for hypertension  She has never had a colonoscopy  Her family history is significant for her mother with diverticulitis for which she underwent some sort of colon resection  Review of Systems   Constitutional: Positive for fever  Negative for activity change, appetite change and chills  HENT: Negative  Eyes: Negative  Respiratory: Negative  Cardiovascular: Negative  Gastrointestinal: Positive for abdominal distention and abdominal pain  Negative for blood in stool, constipation, diarrhea, nausea and vomiting  Endocrine: Negative  Genitourinary: Negative  Musculoskeletal: Negative  Skin: Negative  Allergic/Immunologic: Negative  Hematological: Negative  Psychiatric/Behavioral: Negative          Historical Information   Past Medical History:   Diagnosis Date    Fibroids     Hypertension      Past Surgical History:   Procedure Laterality Date    TUBAL LIGATION  1991     Social History   History Alcohol Use    Yes     Comment: socially     History   Drug Use No     History   Smoking Status    Current Every Day Smoker   Smokeless Tobacco    Never Used     Family History: History reviewed  No pertinent family history  Meds/Allergies   PTA meds:   Prior to Admission Medications   Prescriptions Last Dose Informant Patient Reported? Taking? amLODIPine (NORVASC) 2 5 mg tablet   No Yes   Sig: Take 1 tablet by mouth daily for 20 days   diazepam (VALIUM) 5 mg tablet   No Yes   Sig: Take 1 tablet by mouth every 8 (eight) hours as needed for muscle spasms for up to 10 days      Facility-Administered Medications: None     No Known Allergies    Objective   First Vitals:   Blood Pressure: 153/74 (05/13/18 0849)  Pulse: 95 (05/13/18 0849)  Temperature: 98 7 °F (37 1 °C) (05/13/18 0849)  Temp Source: Oral (05/13/18 0849)  Respirations: 20 (05/13/18 0849)  Height: 4' 9" (144 8 cm) (05/13/18 1052)  Weight - Scale: 92 3 kg (203 lb 7 8 oz) (05/13/18 1052)  SpO2: 98 % (05/13/18 0849)    Current Vitals:   Blood Pressure: 132/76 (05/13/18 2348)  Pulse: 97 (05/13/18 2348)  Temperature: 100 4 °F (38 °C) (05/13/18 2348)  Temp Source: Oral (05/13/18 2348)  Respirations: 18 (05/13/18 2348)  Height: 4' 9" (144 8 cm) (05/13/18 1052)  Weight - Scale: 92 3 kg (203 lb 7 8 oz) (05/13/18 1052)  SpO2: 92 % (05/13/18 2348)      Intake/Output Summary (Last 24 hours) at 05/14/18 0758  Last data filed at 05/13/18 6534   Gross per 24 hour   Intake          1902 08 ml   Output                0 ml   Net          1902 08 ml       Invasive Devices     Peripheral Intravenous Line            Peripheral IV 05/13/18 Right Antecubital less than 1 day                Physical Exam   Constitutional: She is oriented to person, place, and time  She appears well-developed and well-nourished  HENT:   Head: Normocephalic  Eyes: Pupils are equal, round, and reactive to light  Neck: Normal range of motion     Cardiovascular: Normal rate and regular rhythm  Pulmonary/Chest: Effort normal  No respiratory distress  She exhibits no tenderness  Abdominal:   Soft, moderately tender, LLQ, LUQ, epigastrium  No rebound, voluntary guarding in LLQ   Musculoskeletal: Normal range of motion  She exhibits no tenderness or deformity  Neurological: She is alert and oriented to person, place, and time  Skin: Skin is warm and dry  Psychiatric: She has a normal mood and affect  Her behavior is normal        Lab Results:   CBC:   Lab Results   Component Value Date    WBC 12 29 (H) 05/14/2018    HGB 12 1 05/14/2018    HCT 36 8 05/14/2018    MCV 85 05/14/2018     05/14/2018    MCH 27 9 05/14/2018    MCHC 32 9 05/14/2018    RDW 13 9 05/14/2018    MPV 10 7 05/14/2018   , CMP:   Lab Results   Component Value Date     05/14/2018    K 3 6 05/14/2018     05/14/2018    CO2 24 05/14/2018    ANIONGAP 9 05/14/2018    BUN 5 05/14/2018    CREATININE 0 58 (L) 05/14/2018    GLUCOSE 84 05/14/2018    CALCIUM 7 9 (L) 05/14/2018    AST 13 05/13/2018    ALT 27 05/13/2018    ALKPHOS 97 05/13/2018    PROT 7 7 05/13/2018    BILITOT 0 60 05/13/2018    EGFR 109 05/14/2018     Imaging: I have personally reviewed pertinent reports  EKG, Pathology, and Other Studies: I have personally reviewed pertinent reports        Code Status: No Order  Advance Directive and Living Will:      Power of :    POLST:

## 2018-05-14 NOTE — PLAN OF CARE
Problem: DISCHARGE PLANNING - CARE MANAGEMENT  Goal: Discharge to post-acute care or home with appropriate resources  INTERVENTIONS:  - Conduct assessment to determine patient/family and health care team treatment goals, and need for post-acute services based on payer coverage, community resources, and patient preferences, and barriers to discharge  - Address psychosocial, clinical, and financial barriers to discharge as identified in assessment in conjunction with the patient/family and health care team  - Arrange appropriate level of post-acute services according to patients   needs and preference and payer coverage in collaboration with the physician and health care team  - Communicate with and update the patient/family, physician, and health care team regarding progress on the discharge plan  - Arrange appropriate transportation to post-acute venues  Outcome: Progressing  CM met with Pt at bedside  Pt lives home with her  and grandkids  Pt lives in a 1 floor home with 1 MARY  Pt does not have history of DME, HHC, or Rehab  Pt is independent with ambulation and ADL's  Pt uses [facility] [address] and has Rx coverage  Pt denies MH or D&A history  Pt does not have a POA and does not want any information  Pt drives herself to appointments  CM name and role reviewed and Discharge Checklist provided  Encouraged patient and caregiver to review prior to discharge  CM reviewed d/c planning process including the following: identifying help at home, patient preference for d/c planning needs, [facility] Meds to Bed program, availability of treatment team to discuss questions or concerns patient and/or family may have regarding understanding medications and recognizing signs and symptoms once discharged  CM also encouraged patient to follow up with all recommended appointments after discharge  Patient advised of importance for patient and family to participate in managing patients medical well being

## 2018-05-14 NOTE — PLAN OF CARE
DISCHARGE PLANNING - CARE MANAGEMENT     Discharge to post-acute care or home with appropriate resources Progressing        Nutrition/Hydration-ADULT     Nutrient/Hydration intake appropriate for improving, restoring or maintaining nutritional needs Progressing        PAIN - ADULT     Verbalizes/displays adequate comfort level or baseline comfort level Progressing        Potential for Falls     Patient will remain free of falls Progressing

## 2018-05-14 NOTE — CASE MANAGEMENT
Initial Clinical Review    Admission: Date/Time/Statement: 5/13/18 @ 1202 Inpatient Written     Orders Placed This Encounter   Procedures    Inpatient Admission     Standing Status:   Standing     Number of Occurrences:   1     Order Specific Question:   Admitting Physician     Answer:   James Morrison [[id number]]     Order Specific Question:   Level of Care     Answer:   Med Surg [[id number]]     Order Specific Question:   Estimated length of stay     Answer:   More than 2 Midnights     Order Specific Question:   Certification     Answer:   I certify that inpatient services are medically necessary for this patient for a duration of greater than two midnights  See H&P and MD Progress Notes for additional information about the patient's course of treatment  ED: Date/Time/Mode of Arrival:   ED Arrival Information     Expected Arrival Acuity Means of Arrival Escorted By Service Admission Type    - 5/13/2018 08:37 Urgent Walk-In Family Member Surgery-General Urgent    Arrival Complaint    abdominal pain          Chief Complaint:   Chief Complaint   Patient presents with    Abdominal Pain     c/o abd pain over a week, lower quads pelvic -No N/V/D- states no relief  Treated for fibroids last year  Pain unrelenting nothing makes it better, taking ibuprofen around the clock - took 1000 mg this AM @7 AM       History of Illness: Inez Mcgregor is a 50 y o  female who presents with epigastric abdominal pain  She states that it started a few days ago  It was intermittent in nature, but gradually worsened  She was prompted to come to the emergency department by her   She does report subjective fevers in the evening  She states she has had normal bowel movements  In the ED she underwent a CT scan which showed acute sigmoid diverticulitis with perforation and no collection  She is otherwise relatively healthy, and takes amlodipine for hypertension  She has never had a colonoscopy    Her family history is significant for her mother with diverticulitis for which she underwent some sort of colon resection  ED Vital Signs:   ED Triage Vitals [05/13/18 0849]   Temperature Pulse Respirations Blood Pressure SpO2   98 7 °F (37 1 °C) 95 20 153/74 98 %      Temp Source Heart Rate Source Patient Position - Orthostatic VS BP Location FiO2 (%)   Oral Monitor Sitting Right arm --      Pain Score       9        Wt Readings from Last 1 Encounters:   05/13/18 92 3 kg (203 lb 7 8 oz)       Vital Signs (abnormal): temp 100 4    Abnormal Labs/Diagnostic Test Results:   WBC 12 29 (H)     CREATININE 0 58 (L)   CALCIUM 7 9 (L)     Lipase 57         Protein, UA 30 (1+) (A)     Ketones, UA >=160 (4+) (A)     Bilirubin, UA Interference- unable to analyze (A)   Blood, UA Large (A)     CT Abd:  Sigmoid diverticulitis with extraluminal air in the area of inflammation, consistent with perforation  There is no fluid collection  ED Treatment:   Medication Administration from 05/13/2018 0837 to 05/13/2018 1301       Date/Time Order Dose Route Action     05/13/2018 0954 sodium chloride 0 9 % bolus 1,000 mL 1,000 mL Intravenous New Bag     05/13/2018 1045 iohexol (OMNIPAQUE) 350 MG/ML injection (MULTI-DOSE) 100 mL 100 mL Intravenous Given     05/13/2018 1101 potassium chloride (K-DUR,KLOR-CON) CR tablet 40 mEq 40 mEq Oral Given     05/13/2018 1214 sodium chloride 0 9 % bolus 1,000 mL 1,000 mL Intravenous New Bag     05/13/2018 1143 metroNIDAZOLE (FLAGYL) IVPB (premix) 500 mg 500 mg Intravenous New Bag          Past Medical/Surgical History: Active Ambulatory Problems     Diagnosis Date Noted    No Active Ambulatory Problems     Resolved Ambulatory Problems     Diagnosis Date Noted    No Resolved Ambulatory Problems     Past Medical History:   Diagnosis Date    Fibroids     Hypertension        Admitting Diagnosis: Abdominal pain [R10 9]  SIRS (systemic inflammatory response syndrome) ([facility] [address] ) [R65 10]  Acute diverticulitis [K57 92]    Age/Sex: 50 y o  female     Assessment:  50 F with acute sigmoid diverticulitis with perforation     Plan:  Trial of clears today  Maintenance IV fluids - D5 1/2 NS @ 100  Replete electrolytes  Continue Levaquin/Flagyl  Serial abdominal exams  OOB and ambulate  Lovenox ppx     Admission Orders:  NPO  I/O  Blood cxs pending    Scheduled Meds:   Current Facility-Administered Medications:  acetaminophen 650 mg Oral Q6H PRN   amLODIPine 5 mg Oral Daily   enoxaparin 40 mg Subcutaneous Daily   lactated ringers 125 mL/hr Intravenous Continuous   levofloxacin 750 mg Intravenous Q24H   metroNIDAZOLE 500 mg Intravenous Q8H   morphine injection 4 mg Intravenous Q1H PRN   nicotine 14 mg Transdermal Daily   ondansetron 4 mg Intravenous Q4H PRN     Continuous Infusions:   lactated ringers 125 mL/hr Last Rate: 125 mL/hr (05/14/18 0612)     PRN Meds:   acetaminophen    morphine injection 4mg IV x4 thus far    ondansetron    Thank you,  [address] [place] [address] in the [facility] by Benigno Calix for 2017  Network Utilization Review Department  Phone: [phone]; Fax [phone]  ATTENTION: The Network Utilization Review Department is now centralized for our 7 Facilities  Make a note that we have a new phone and fax numbers for our Department  Please call with any questions or concerns to [phone] and carefully follow the prompts so that you are directed to the right person  All voicemails are confidential  Fax any determinations, approvals, denials, and requests for initial or continue stay review clinical to [phone]  Due to HIGH CALL volume, it would be easier if you could please send faxed requests to expedite your requests and in part, help us provide discharge notifications faster

## 2018-05-14 NOTE — SOCIAL WORK
CM met with Pt at bedside  Pt lives home with her  and grandkids  Pt lives in a 1 floor home with 1 MARY  Pt does not have history of DME, HHC, or Rehab  Pt is independent with ambulation and ADL's  Pt uses [facility] [address] and has Rx coverage  Pt denies MH or D&A history  Pt does not have a POA and does not want any information  Pt drives herself to appointments  CM name and role reviewed and Discharge Checklist provided  Encouraged patient and caregiver to review prior to discharge  CM reviewed d/c planning process including the following: identifying help at home, patient preference for d/c planning needs, [facility] Meds to Bed program, availability of treatment team to discuss questions or concerns patient and/or family may have regarding understanding medications and recognizing signs and symptoms once discharged  CM also encouraged patient to follow up with all recommended appointments after discharge  Patient advised of importance for patient and family to participate in managing patients medical well being

## 2018-05-14 NOTE — PLAN OF CARE
Problem: Potential for Falls  Goal: Patient will remain free of falls  INTERVENTIONS:  - Assess patient frequently for physical needs  -  Identify cognitive and physical deficits and behaviors that affect risk of falls    -  [place] fall precautions as indicated by assessment   - Educate patient/family on patient safety including physical limitations  - Instruct patient to call for assistance with activity based on assessment  - Modify environment to reduce risk of injury  - Consider OT/PT consult to assist with strengthening/mobility   Outcome: Progressing      Problem: PAIN - ADULT  Goal: Verbalizes/displays adequate comfort level or baseline comfort level  Interventions:  - Encourage patient to monitor pain and request assistance  - Assess pain using appropriate pain scale  - Administer analgesics based on type and severity of pain and evaluate response  - Implement non-pharmacological measures as appropriate and evaluate response  - Consider cultural and social influences on pain and pain management  - Notify physician/advanced practitioner if interventions unsuccessful or patient reports new pain   Outcome: Progressing

## 2018-05-15 LAB
ANION GAP SERPL CALCULATED.3IONS-SCNC: 5 MMOL/L (ref 4–13)
BASOPHILS # BLD AUTO: 0.02 THOUSANDS/ΜL (ref 0–0.1)
BASOPHILS NFR BLD AUTO: 0 % (ref 0–1)
BUN SERPL-MCNC: 4 MG/DL (ref 5–25)
CALCIUM SERPL-MCNC: 8.5 MG/DL (ref 8.3–10.1)
CHLORIDE SERPL-SCNC: 105 MMOL/L (ref 100–108)
CO2 SERPL-SCNC: 28 MMOL/L (ref 21–32)
CREAT SERPL-MCNC: 0.57 MG/DL (ref 0.6–1.3)
EOSINOPHIL # BLD AUTO: 0.13 THOUSAND/ΜL (ref 0–0.61)
EOSINOPHIL NFR BLD AUTO: 1 % (ref 0–6)
ERYTHROCYTE [DISTWIDTH] IN BLOOD BY AUTOMATED COUNT: 13.8 % (ref 11.6–15.1)
GFR SERPL CREATININE-BSD FRML MDRD: 110 ML/MIN/1.73SQ M
GLUCOSE SERPL-MCNC: 104 MG/DL (ref 65–140)
HCT VFR BLD AUTO: 36.7 % (ref 34.8–46.1)
HGB BLD-MCNC: 12.1 G/DL (ref 11.5–15.4)
LYMPHOCYTES # BLD AUTO: 1.43 THOUSANDS/ΜL (ref 0.6–4.47)
LYMPHOCYTES NFR BLD AUTO: 14 % (ref 14–44)
MAGNESIUM SERPL-MCNC: 1.9 MG/DL (ref 1.6–2.6)
MCH RBC QN AUTO: 27.9 PG (ref 26.8–34.3)
MCHC RBC AUTO-ENTMCNC: 33 G/DL (ref 31.4–37.4)
MCV RBC AUTO: 85 FL (ref 82–98)
MONOCYTES # BLD AUTO: 1.07 THOUSAND/ΜL (ref 0.17–1.22)
MONOCYTES NFR BLD AUTO: 11 % (ref 4–12)
NEUTROPHILS # BLD AUTO: 7.37 THOUSANDS/ΜL (ref 1.85–7.62)
NEUTS SEG NFR BLD AUTO: 74 % (ref 43–75)
PLATELET # BLD AUTO: 272 THOUSANDS/UL (ref 149–390)
PMV BLD AUTO: 10.4 FL (ref 8.9–12.7)
POTASSIUM SERPL-SCNC: 3.6 MMOL/L (ref 3.5–5.3)
RBC # BLD AUTO: 4.33 MILLION/UL (ref 3.81–5.12)
SODIUM SERPL-SCNC: 138 MMOL/L (ref 136–145)
WBC # BLD AUTO: 10.02 THOUSAND/UL (ref 4.31–10.16)

## 2018-05-15 PROCEDURE — 83735 ASSAY OF MAGNESIUM: CPT | Performed by: STUDENT IN AN ORGANIZED HEALTH CARE EDUCATION/TRAINING PROGRAM

## 2018-05-15 PROCEDURE — 80048 BASIC METABOLIC PNL TOTAL CA: CPT | Performed by: STUDENT IN AN ORGANIZED HEALTH CARE EDUCATION/TRAINING PROGRAM

## 2018-05-15 PROCEDURE — 85025 COMPLETE CBC W/AUTO DIFF WBC: CPT | Performed by: STUDENT IN AN ORGANIZED HEALTH CARE EDUCATION/TRAINING PROGRAM

## 2018-05-15 RX ADMIN — SODIUM CHLORIDE, SODIUM LACTATE, POTASSIUM CHLORIDE, AND CALCIUM CHLORIDE 75 ML/HR: .6; .31; .03; .02 INJECTION, SOLUTION INTRAVENOUS at 21:52

## 2018-05-15 RX ADMIN — ONDANSETRON 4 MG: 2 INJECTION INTRAMUSCULAR; INTRAVENOUS at 12:22

## 2018-05-15 RX ADMIN — MORPHINE SULFATE 2 MG: 4 INJECTION, SOLUTION INTRAMUSCULAR; INTRAVENOUS at 12:22

## 2018-05-15 RX ADMIN — METRONIDAZOLE 500 MG: 500 INJECTION, SOLUTION INTRAVENOUS at 05:32

## 2018-05-15 RX ADMIN — METRONIDAZOLE 500 MG: 500 INJECTION, SOLUTION INTRAVENOUS at 21:51

## 2018-05-15 RX ADMIN — SODIUM CHLORIDE, SODIUM LACTATE, POTASSIUM CHLORIDE, AND CALCIUM CHLORIDE 125 ML/HR: .6; .31; .03; .02 INJECTION, SOLUTION INTRAVENOUS at 07:47

## 2018-05-15 RX ADMIN — AMLODIPINE BESYLATE 5 MG: 5 TABLET ORAL at 09:24

## 2018-05-15 RX ADMIN — METRONIDAZOLE 500 MG: 500 INJECTION, SOLUTION INTRAVENOUS at 13:45

## 2018-05-15 RX ADMIN — LEVOFLOXACIN 750 MG: 5 INJECTION, SOLUTION INTRAVENOUS at 11:56

## 2018-05-15 RX ADMIN — ENOXAPARIN SODIUM 40 MG: 40 INJECTION SUBCUTANEOUS at 09:24

## 2018-05-15 NOTE — PROGRESS NOTES
Pt is resting  New bag of fluids hung  No complaints at this time  No s/s of distress, will continue to monitor

## 2018-05-15 NOTE — PLAN OF CARE
Problem: Potential for Falls  Goal: Patient will remain free of falls  INTERVENTIONS:  - Assess patient frequently for physical needs  -  Identify cognitive and physical deficits and behaviors that affect risk of falls    -  [place] fall precautions as indicated by assessment   - Educate patient/family on patient safety including physical limitations  - Instruct patient to call for assistance with activity based on assessment  - Modify environment to reduce risk of injury  - Consider OT/PT consult to assist with strengthening/mobility   Outcome: Progressing      Problem: PAIN - ADULT  Goal: Verbalizes/displays adequate comfort level or baseline comfort level  Interventions:  - Encourage patient to monitor pain and request assistance  - Assess pain using appropriate pain scale  - Administer analgesics based on type and severity of pain and evaluate response  - Implement non-pharmacological measures as appropriate and evaluate response  - Consider cultural and social influences on pain and pain management  - Notify physician/advanced practitioner if interventions unsuccessful or patient reports new pain   Outcome: Progressing      Problem: DISCHARGE PLANNING - CARE MANAGEMENT  Goal: Discharge to post-acute care or home with appropriate resources  INTERVENTIONS:  - Conduct assessment to determine patient/family and health care team treatment goals, and need for post-acute services based on payer coverage, community resources, and patient preferences, and barriers to discharge  - Address psychosocial, clinical, and financial barriers to discharge as identified in assessment in conjunction with the patient/family and health care team  - Arrange appropriate level of post-acute services according to patient's   needs and preference and payer coverage in collaboration with the physician and health care team  - Communicate with and update the patient/family, physician, and health care team regarding progress on the discharge plan  - Arrange appropriate transportation to post-acute venues   Outcome: Progressing

## 2018-05-15 NOTE — PROGRESS NOTES
Progress Note - General Surgery   Kathaleen Seat 50 y o  female MRN: [id number]  Unit/Bed#: -01 Encounter: [id number]    Assessment:  50 F with acute sigmoid diverticulitis with perforation    Plan:  Surgical soft diet today  IV fluids  Continue antibiotics, levaquin/flagyl  PRN pain meds  OOB and ambulate  Lovenox    Subjective/Objective     Subjective: No acute events  Tolerating clears though does not like the sugary drinks  Moved bowels  Pain is much improved  Objective:    Blood pressure 139/67, pulse 71, temperature 98 3 °F (36 8 °C), temperature source Oral, resp  rate 18, height 4' 9" (1 448 m), weight 92 3 kg (203 lb 7 8 oz), SpO2 93 %  ,Body mass index is 44 03 kg/m²        Intake/Output Summary (Last 24 hours) at 05/15/18 0832  Last data filed at 05/15/18 0717   Gross per 24 hour   Intake          3330 42 ml   Output                0 ml   Net          3330 42 ml       Invasive Devices     Peripheral Intravenous Line            Peripheral IV 05/13/18 Right Antecubital 1 day                Physical Exam:   General: NAD, AAOx3  CV: RRR +S1/S2  Chest: breath sounds bilaterally  Abdomen: soft, obese, minimally tender LLQ  Extremities: atraumatic, no edema        Results from last 7 days  Lab Units 05/15/18  0540 05/14/18  0638 05/13/18  0954   WBC Thousand/uL 10 02 12 29* 15 13*   HEMOGLOBIN g/dL 12 1 12 1 13 3   HEMATOCRIT % 36 7 36 8 40 8   PLATELETS Thousands/uL 272 251 246       Results from last 7 days  Lab Units 05/15/18  0540 05/14/18  0638 05/13/18  0954   SODIUM mmol/L 138 137 137   POTASSIUM mmol/L 3 6 3 6 3 2*   CHLORIDE mmol/L 105 104 102   CO2 mmol/L 28 24 27   BUN mg/dL 4* 5 7   CREATININE mg/dL 0 57* 0 58* 0 71   GLUCOSE RANDOM mg/dL 104 84 108   CALCIUM mg/dL 8 5 7 9* 8 6       Results from last 7 days  Lab Units 05/13/18  0954   INR  1 06   PTT seconds 37*

## 2018-05-16 VITALS
OXYGEN SATURATION: 96 % | TEMPERATURE: 98.3 F | SYSTOLIC BLOOD PRESSURE: 138 MMHG | RESPIRATION RATE: 16 BRPM | WEIGHT: 203.48 LBS | BODY MASS INDEX: 43.9 KG/M2 | HEART RATE: 72 BPM | DIASTOLIC BLOOD PRESSURE: 80 MMHG | HEIGHT: 57 IN

## 2018-05-16 RX ORDER — CALCIUM CARBONATE 200(500)MG
500 TABLET,CHEWABLE ORAL DAILY PRN
Status: DISCONTINUED | OUTPATIENT
Start: 2018-05-16 | End: 2018-05-16 | Stop reason: HOSPADM

## 2018-05-16 RX ORDER — AMOXICILLIN AND CLAVULANATE POTASSIUM 875; 125 MG/1; MG/1
1 TABLET, FILM COATED ORAL EVERY 12 HOURS SCHEDULED
Qty: 14 TABLET | Refills: 0 | Status: SHIPPED | OUTPATIENT
Start: 2018-05-16 | End: 2018-05-23

## 2018-05-16 RX ORDER — METRONIDAZOLE 250 MG/1
500 TABLET ORAL EVERY 8 HOURS SCHEDULED
Qty: 42 TABLET | Refills: 0 | Status: SHIPPED | OUTPATIENT
Start: 2018-05-16 | End: 2018-05-23

## 2018-05-16 RX ADMIN — METRONIDAZOLE 500 MG: 500 INJECTION, SOLUTION INTRAVENOUS at 05:27

## 2018-05-16 RX ADMIN — AMLODIPINE BESYLATE 5 MG: 5 TABLET ORAL at 09:19

## 2018-05-16 RX ADMIN — ANTACID TABLETS 500 MG: 500 TABLET, CHEWABLE ORAL at 01:35

## 2018-05-16 NOTE — PLAN OF CARE
Problem: Potential for Falls  Goal: Patient will remain free of falls  INTERVENTIONS:  - Assess patient frequently for physical needs  -  Identify cognitive and physical deficits and behaviors that affect risk of falls    -  [place] fall precautions as indicated by assessment   - Educate patient/family on patient safety including physical limitations  - Instruct patient to call for assistance with activity based on assessment  - Modify environment to reduce risk of injury  - Consider OT/PT consult to assist with strengthening/mobility   Outcome: Adequate for Discharge      Problem: PAIN - ADULT  Goal: Verbalizes/displays adequate comfort level or baseline comfort level  Interventions:  - Encourage patient to monitor pain and request assistance  - Assess pain using appropriate pain scale  - Administer analgesics based on type and severity of pain and evaluate response  - Implement non-pharmacological measures as appropriate and evaluate response  - Consider cultural and social influences on pain and pain management  - Notify physician/advanced practitioner if interventions unsuccessful or patient reports new pain   Outcome: Progressing      Problem: DISCHARGE PLANNING - CARE MANAGEMENT  Goal: Discharge to post-acute care or home with appropriate resources  INTERVENTIONS:  - Conduct assessment to determine patient/family and health care team treatment goals, and need for post-acute services based on payer coverage, community resources, and patient preferences, and barriers to discharge  - Address psychosocial, clinical, and financial barriers to discharge as identified in assessment in conjunction with the patient/family and health care team  - Arrange appropriate level of post-acute services according to patient's   needs and preference and payer coverage in collaboration with the physician and health care team  - Communicate with and update the patient/family, physician, and health care team regarding progress on the discharge plan  - Arrange appropriate transportation to post-acute venues   Outcome: Progressing      Problem: Nutrition/Hydration-ADULT  Goal: Nutrient/Hydration intake appropriate for improving, restoring or maintaining nutritional needs  Monitor and assess patient's nutrition/hydration status for malnutrition (ex- brittle hair, bruises, dry skin, pale skin and conjunctiva, muscle wasting, smooth red tongue, and disorientation)  Collaborate with interdisciplinary team and initiate plan and interventions as ordered  Monitor patient's weight and dietary intake as ordered or per policy  Utilize nutrition screening tool and intervene per policy  Determine patient's food preferences and provide high-protein, high-caloric foods as appropriate       INTERVENTIONS:  - Monitor oral intake, urinary output, labs, and treatment plans  - Assess nutrition and hydration status and recommend course of action  - Evaluate amount of meals eaten  - Assist patient with eating if necessary   - Allow adequate time for meals  - Recommend/ encourage appropriate diets, oral nutritional supplements, and vitamin/mineral supplements  - Order, calculate, and assess calorie counts as needed  - Recommend, monitor, and adjust tube feedings and TPN/PPN based on assessed needs  - Assess need for intravenous fluids  - Provide specific nutrition/hydration education as appropriate  - Include patient/family/caregiver in decisions related to nutrition   Outcome: Progressing

## 2018-05-16 NOTE — PROGRESS NOTES
Progress Note -Surgery PA  Emil Diaz 50 y o  female MRN: [id number]  Unit/Bed#: -01 Encounter: [id number]      Assessment:  50year old female with diverticulitis with perforation  Plan:  -advance to surg soft  -dc fluids  -pain control if needed  -ambulate  -continue antibiotics  -possible DC later today with PO abx if still doing well  Subjective/Objective     Subjective: Feels well overall  Tolerating diet  Had some heartburn  Objective:     /84 (BP Location: Left arm)   Pulse 78   Temp 98 6 °F (37 °C) (Oral)   Resp 14   Ht 4' 9" (1 448 m)   Wt 92 3 kg (203 lb 7 8 oz)   LMP  (Approximate)   SpO2 94%   BMI 44 03 kg/m²   I/O last 24 hours:   In: 600 [P O :600]  Out: -         Intake/Output Summary (Last 24 hours) at 05/16/18 0718  Last data filed at 05/15/18 1407   Gross per 24 hour   Intake              480 ml   Output                0 ml   Net              480 ml       Invasive Devices     Peripheral Intravenous Line            Peripheral IV 05/13/18 Right Antecubital 2 days                Physical Exam:  /84 (BP Location: Left arm)   Pulse 78   Temp 98 6 °F (37 °C) (Oral)   Resp 14   Ht 4' 9" (1 448 m)   Wt 92 3 kg (203 lb 7 8 oz)   LMP  (Approximate)   SpO2 94%   BMI 44 03 kg/m²   General appearance: alert and oriented, in no acute distress and alert  Lungs: clear to auscultation bilaterally  Heart: regular rate and rhythm, S1, S2 normal, no murmur, click, rub or gallop  Abdomen: soft, non-tender; bowel sounds normal; no masses,  no organomegaly      Current Facility-Administered Medications:     acetaminophen (TYLENOL) tablet 650 mg, 650 mg, Oral, Q6H PRN, Stalin Merchant DO    amLODIPine (NORVASC) tablet 5 mg, 5 mg, Oral, Daily, Alexis Pike MD, 5 mg at 05/15/18 0924    calcium carbonate (TUMS) chewable tablet 500 mg, 500 mg, Oral, Daily PRN, Rosaura Rey PA-C, 500 mg at 05/16/18 0135    enoxaparin (LOVENOX) subcutaneous injection 40 mg, 40 mg, Subcutaneous, Daily, Stalin Merchant DO, 40 mg at 05/15/18 0924    levofloxacin (LEVAQUIN) IVPB (premix) 750 mg, 750 mg, Intravenous, Q24H, Stalin Merchant DO, Last Rate: 100 mL/hr at 05/15/18 1156, 750 mg at 05/15/18 1156    metroNIDAZOLE (FLAGYL) IVPB (premix) 500 mg, 500 mg, Intravenous, Q8H, Stalin Merchant DO, Last Rate: 200 mL/hr at 05/16/18 0527, 500 mg at 05/16/18 0527    morphine (PF) 4 mg/mL injection 4 mg, 4 mg, Intravenous, Q1H PRN, Stalin Merchant DO, 2 mg at 05/15/18 1222    nicotine (NICODERM CQ) 14 mg/24hr TD 24 hr patch 14 mg, 14 mg, Transdermal, Daily, Stalin Merchant DO    ondansetron (ZOFRAN) injection 4 mg, 4 mg, Intravenous, Q4H PRN, Stalin Merchant DO, 4 mg at 05/15/18 1222           Lab, Imaging and other studies:CBC: No results found for: WBC, HGB, HCT, MCV, PLT, ADJUSTEDWBC, MCH, MCHC, RDW, MPV, NRBC, CMP: No results found for: NA, K, CL, CO2, ANIONGAP, BUN, CREATININE, GLUCOSE, CALCIUM, AST, ALT, ALKPHOS, PROT, ALBUMIN, BILITOT, EGFR      VTE Pharmacologic Prophylaxis: Sequential compression device (Venodyne)  and Enoxaparin (Lovenox)  VTE Mechanical Prophylaxis: sequential compression device    Rounds performed with nursing

## 2018-05-16 NOTE — CASE MANAGEMENT
Notification of Discharge  This is a Notification of Discharge from our facility [address]  Please be advised that this patient has been discharge from our facility  Below you will find the admission and discharge date and time including the patients disposition  PRESENTATION DATE: 5/13/2018  8:43 AM  IP ADMISSION DATE: 5/13/18 1202  DISCHARGE DATE: 5/16/2018 11:07 AM  DISPOSITION: Home/Self Care    [address] [facility] in the [facility] by Benigno Calix for 2017  Network Utilization Review Department  Phone: [phone]; Fax [phone]  ATTENTION: The Network Utilization Review Department is now centralized for our 7 Facilities  Make a note that we have a new phone and fax numbers for our Department  Please call with any questions or concerns to [phone] and carefully follow the prompts so that you are directed to the right person  All voicemails are confidential  Fax any determinations, approvals, denials, and requests for initial or continue stay review clinical to [phone]  Due to HIGH CALL volume, it would be easier if you could please send faxed requests to expedite your requests and in part, help us provide discharge notifications faster        Reference #[id number]

## 2018-05-16 NOTE — PLAN OF CARE

## 2018-05-17 NOTE — DISCHARGE SUMMARY
Discharge Summary - Camryn Carvalho 50 y o  female MRN: [id number]    Unit/Bed#: -01 Encounter: [id number]    Admission Date: 5/13/2018   Discharge Date: 5/16/2018  Admitting Diagnosis:   Abdominal pain [R10 9]  [facility] ) [R65 10]  Acute diverticulitis [K57 92]    Discharge Diagnoses: Principal Problem:    Perforation of sigmoid colon due to diverticulitis      Consultations: none    Procedures Performed: none    Hospital Course: Camryn Carvalho is a 50 y o  female admitted for acute diverticulitis with perforation  She came to the ED with intermittent abdominal pain that became persistent  She had a CT that was positive for acute perforated diverticulitis so she was admitted to the surgical service for monitoring  She was given pain control, fluids, DVT ppx and antibiotics  Her labs were monitored daily  When pain improved her diet was advanced as tolerated  By hospital day 3 her pain resolved and she was tolerating a regular diet  She was discharged with 1 week of PO antibiotics and instructed to follow up in the office in 1 month  Condition at Discharge: good     Discharge instructions/Information to patient and family:   See after visit summary for information provided to patient and family  Provisions for Follow-Up Care:  See after visit summary for information related to follow-up care and any pertinent home health orders  Disposition: Home    Planned Readmission: No    Discharge Statement   I spent 30 minutes discharging the patient  This time was spent on the day of discharge  I had direct contact with the patient on the day of discharge  Additional documentation is required if more than 30 minutes were spent on discharge  Discharge Medications:  See after visit summary for reconciled discharge medications provided to patient and family

## 2018-05-18 LAB
BACTERIA BLD CULT: NORMAL
BACTERIA BLD CULT: NORMAL

## 2020-07-06 ENCOUNTER — APPOINTMENT (EMERGENCY)
Dept: CT IMAGING | Facility: HOSPITAL | Age: 50
DRG: 392 | End: 2020-07-06
Payer: COMMERCIAL

## 2020-07-06 ENCOUNTER — HOSPITAL ENCOUNTER (INPATIENT)
Facility: HOSPITAL | Age: 50
LOS: 3 days | Discharge: HOME | DRG: 392 | End: 2020-07-09
Attending: EMERGENCY MEDICINE | Admitting: SURGERY
Payer: COMMERCIAL

## 2020-07-06 DIAGNOSIS — R10.9 ABDOMINAL PAIN: Primary | ICD-10-CM

## 2020-07-06 DIAGNOSIS — K57.20 PERFORATION OF SIGMOID COLON DUE TO DIVERTICULITIS: ICD-10-CM

## 2020-07-06 LAB
ALBUMIN SERPL BCP-MCNC: 3 G/DL (ref 3.5–5)
ALP SERPL-CCNC: 127 U/L (ref 46–116)
ALT SERPL W P-5'-P-CCNC: 34 U/L (ref 12–78)
ANION GAP SERPL CALCULATED.3IONS-SCNC: 11 MMOL/L (ref 4–13)
APTT PPP: 31 SECONDS (ref 23–37)
AST SERPL W P-5'-P-CCNC: 17 U/L (ref 5–45)
ATRIAL RATE: 82 BPM
BASOPHILS # BLD AUTO: 0.05 THOUSANDS/ΜL (ref 0–0.1)
BASOPHILS NFR BLD AUTO: 0 % (ref 0–1)
BILIRUB SERPL-MCNC: 0.42 MG/DL (ref 0.2–1)
BUN SERPL-MCNC: 16 MG/DL (ref 5–25)
CALCIUM SERPL-MCNC: 9.3 MG/DL (ref 8.3–10.1)
CHLORIDE SERPL-SCNC: 101 MMOL/L (ref 100–108)
CO2 SERPL-SCNC: 30 MMOL/L (ref 21–32)
CREAT SERPL-MCNC: 0.94 MG/DL (ref 0.6–1.3)
EOSINOPHIL # BLD AUTO: 0.02 THOUSAND/ΜL (ref 0–0.61)
EOSINOPHIL NFR BLD AUTO: 0 % (ref 0–6)
ERYTHROCYTE [DISTWIDTH] IN BLOOD BY AUTOMATED COUNT: 13.6 % (ref 11.6–15.1)
GFR SERPL CREATININE-BSD FRML MDRD: 71 ML/MIN/1.73SQ M
GLUCOSE SERPL-MCNC: 110 MG/DL (ref 65–140)
HCT VFR BLD AUTO: 44.6 % (ref 34.8–46.1)
HGB BLD-MCNC: 14 G/DL (ref 11.5–15.4)
HOLD SPECIMEN: NORMAL
IMM GRANULOCYTES # BLD AUTO: 0.05 THOUSAND/UL (ref 0–0.2)
IMM GRANULOCYTES NFR BLD AUTO: 0 % (ref 0–2)
INR PPP: 1.12 (ref 0.84–1.19)
LIPASE SERPL-CCNC: 60 U/L (ref 73–393)
LYMPHOCYTES # BLD AUTO: 1.85 THOUSANDS/ΜL (ref 0.6–4.47)
LYMPHOCYTES NFR BLD AUTO: 15 % (ref 14–44)
MCH RBC QN AUTO: 26.5 PG (ref 26.8–34.3)
MCHC RBC AUTO-ENTMCNC: 31.4 G/DL (ref 31.4–37.4)
MCV RBC AUTO: 85 FL (ref 82–98)
MONOCYTES # BLD AUTO: 1.04 THOUSAND/ΜL (ref 0.17–1.22)
MONOCYTES NFR BLD AUTO: 9 % (ref 4–12)
NEUTROPHILS # BLD AUTO: 9.1 THOUSANDS/ΜL (ref 1.85–7.62)
NEUTS SEG NFR BLD AUTO: 76 % (ref 43–75)
NRBC BLD AUTO-RTO: 0 /100 WBCS
P AXIS: 44 DEGREES
PLATELET # BLD AUTO: 415 THOUSANDS/UL (ref 149–390)
PLATELET # BLD AUTO: 481 THOUSANDS/UL (ref 149–390)
PMV BLD AUTO: 10.2 FL (ref 8.9–12.7)
PMV BLD AUTO: 9.8 FL (ref 8.9–12.7)
POTASSIUM SERPL-SCNC: 3.3 MMOL/L (ref 3.5–5.3)
PR INTERVAL: 150 MS
PROT SERPL-MCNC: 8.5 G/DL (ref 6.4–8.2)
PROTHROMBIN TIME: 13.8 SECONDS (ref 11.6–14.5)
QRS AXIS: 9 DEGREES
QRSD INTERVAL: 76 MS
QT INTERVAL: 352 MS
QTC INTERVAL: 404 MS
RBC # BLD AUTO: 5.28 MILLION/UL (ref 3.81–5.12)
SODIUM SERPL-SCNC: 142 MMOL/L (ref 136–145)
T WAVE AXIS: 3 DEGREES
VENTRICULAR RATE: 79 BPM
WBC # BLD AUTO: 12.11 THOUSAND/UL (ref 4.31–10.16)

## 2020-07-06 PROCEDURE — 96365 THER/PROPH/DIAG IV INF INIT: CPT

## 2020-07-06 PROCEDURE — 85025 COMPLETE CBC W/AUTO DIFF WBC: CPT | Performed by: PHYSICIAN ASSISTANT

## 2020-07-06 PROCEDURE — 80053 COMPREHEN METABOLIC PANEL: CPT | Performed by: PHYSICIAN ASSISTANT

## 2020-07-06 PROCEDURE — 85610 PROTHROMBIN TIME: CPT | Performed by: PHYSICIAN ASSISTANT

## 2020-07-06 PROCEDURE — 74177 CT ABD & PELVIS W/CONTRAST: CPT

## 2020-07-06 PROCEDURE — 99285 EMERGENCY DEPT VISIT HI MDM: CPT | Performed by: PHYSICIAN ASSISTANT

## 2020-07-06 PROCEDURE — 93010 ELECTROCARDIOGRAM REPORT: CPT | Performed by: INTERNAL MEDICINE

## 2020-07-06 PROCEDURE — 36415 COLL VENOUS BLD VENIPUNCTURE: CPT

## 2020-07-06 PROCEDURE — 85049 AUTOMATED PLATELET COUNT: CPT | Performed by: SURGERY

## 2020-07-06 PROCEDURE — 99285 EMERGENCY DEPT VISIT HI MDM: CPT

## 2020-07-06 PROCEDURE — 93005 ELECTROCARDIOGRAM TRACING: CPT

## 2020-07-06 PROCEDURE — 87040 BLOOD CULTURE FOR BACTERIA: CPT | Performed by: PHYSICIAN ASSISTANT

## 2020-07-06 PROCEDURE — 85730 THROMBOPLASTIN TIME PARTIAL: CPT | Performed by: PHYSICIAN ASSISTANT

## 2020-07-06 PROCEDURE — 83690 ASSAY OF LIPASE: CPT | Performed by: PHYSICIAN ASSISTANT

## 2020-07-06 PROCEDURE — 96361 HYDRATE IV INFUSION ADD-ON: CPT

## 2020-07-06 RX ORDER — AMLODIPINE BESYLATE 5 MG/1
5 TABLET ORAL DAILY
Status: DISCONTINUED | OUTPATIENT
Start: 2020-07-06 | End: 2020-07-09 | Stop reason: HOSPADM

## 2020-07-06 RX ORDER — ONDANSETRON 2 MG/ML
4 INJECTION INTRAMUSCULAR; INTRAVENOUS EVERY 6 HOURS PRN
Status: DISCONTINUED | OUTPATIENT
Start: 2020-07-06 | End: 2020-07-09 | Stop reason: HOSPADM

## 2020-07-06 RX ORDER — CEFAZOLIN SODIUM 2 G/50ML
2000 SOLUTION INTRAVENOUS ONCE
Status: COMPLETED | OUTPATIENT
Start: 2020-07-06 | End: 2020-07-06

## 2020-07-06 RX ORDER — HYDROMORPHONE HCL/PF 1 MG/ML
0.5 SYRINGE (ML) INJECTION
Status: DISCONTINUED | OUTPATIENT
Start: 2020-07-06 | End: 2020-07-09 | Stop reason: HOSPADM

## 2020-07-06 RX ORDER — SODIUM CHLORIDE, SODIUM LACTATE, POTASSIUM CHLORIDE, CALCIUM CHLORIDE 600; 310; 30; 20 MG/100ML; MG/100ML; MG/100ML; MG/100ML
75 INJECTION, SOLUTION INTRAVENOUS CONTINUOUS
Status: DISCONTINUED | OUTPATIENT
Start: 2020-07-06 | End: 2020-07-08

## 2020-07-06 RX ORDER — DIAZEPAM 5 MG/1
5 TABLET ORAL EVERY 8 HOURS PRN
Status: DISCONTINUED | OUTPATIENT
Start: 2020-07-06 | End: 2020-07-09 | Stop reason: HOSPADM

## 2020-07-06 RX ORDER — HEPARIN SODIUM 5000 [USP'U]/ML
5000 INJECTION, SOLUTION INTRAVENOUS; SUBCUTANEOUS EVERY 8 HOURS SCHEDULED
Status: DISCONTINUED | OUTPATIENT
Start: 2020-07-06 | End: 2020-07-09 | Stop reason: HOSPADM

## 2020-07-06 RX ORDER — OXYCODONE HYDROCHLORIDE 5 MG/1
5 TABLET ORAL EVERY 4 HOURS PRN
Status: DISCONTINUED | OUTPATIENT
Start: 2020-07-06 | End: 2020-07-09 | Stop reason: HOSPADM

## 2020-07-06 RX ORDER — OXYCODONE HYDROCHLORIDE 10 MG/1
10 TABLET ORAL EVERY 4 HOURS PRN
Status: DISCONTINUED | OUTPATIENT
Start: 2020-07-06 | End: 2020-07-09 | Stop reason: HOSPADM

## 2020-07-06 RX ORDER — ACETAMINOPHEN 325 MG/1
975 TABLET ORAL EVERY 6 HOURS PRN
Status: DISCONTINUED | OUTPATIENT
Start: 2020-07-06 | End: 2020-07-07

## 2020-07-06 RX ORDER — CEFAZOLIN SODIUM 2 G/50ML
2000 SOLUTION INTRAVENOUS EVERY 8 HOURS
Status: DISCONTINUED | OUTPATIENT
Start: 2020-07-06 | End: 2020-07-09 | Stop reason: HOSPADM

## 2020-07-06 RX ORDER — CALCIUM CARBONATE 200(500)MG
500 TABLET,CHEWABLE ORAL 3 TIMES DAILY PRN
Status: DISCONTINUED | OUTPATIENT
Start: 2020-07-06 | End: 2020-07-09 | Stop reason: HOSPADM

## 2020-07-06 RX ORDER — SODIUM CHLORIDE, SODIUM LACTATE, POTASSIUM CHLORIDE, CALCIUM CHLORIDE 600; 310; 30; 20 MG/100ML; MG/100ML; MG/100ML; MG/100ML
75 INJECTION, SOLUTION INTRAVENOUS CONTINUOUS
Status: DISCONTINUED | OUTPATIENT
Start: 2020-07-06 | End: 2020-07-06

## 2020-07-06 RX ADMIN — IOHEXOL 100 ML: 350 INJECTION, SOLUTION INTRAVENOUS at 12:52

## 2020-07-06 RX ADMIN — HEPARIN SODIUM 5000 UNITS: 5000 INJECTION INTRAVENOUS; SUBCUTANEOUS at 22:42

## 2020-07-06 RX ADMIN — METRONIDAZOLE 500 MG: 500 INJECTION, SOLUTION INTRAVENOUS at 22:42

## 2020-07-06 RX ADMIN — HEPARIN SODIUM 5000 UNITS: 5000 INJECTION INTRAVENOUS; SUBCUTANEOUS at 17:21

## 2020-07-06 RX ADMIN — CEFAZOLIN SODIUM 2000 MG: 2 SOLUTION INTRAVENOUS at 21:36

## 2020-07-06 RX ADMIN — SODIUM CHLORIDE 1000 ML: 0.9 INJECTION, SOLUTION INTRAVENOUS at 11:59

## 2020-07-06 RX ADMIN — METRONIDAZOLE 500 MG: 500 INJECTION, SOLUTION INTRAVENOUS at 15:35

## 2020-07-06 RX ADMIN — SODIUM CHLORIDE, SODIUM LACTATE, POTASSIUM CHLORIDE, AND CALCIUM CHLORIDE 125 ML/HR: .6; .31; .03; .02 INJECTION, SOLUTION INTRAVENOUS at 17:21

## 2020-07-06 RX ADMIN — CEFAZOLIN SODIUM 2000 MG: 2 SOLUTION INTRAVENOUS at 13:42

## 2020-07-06 NOTE — SEPSIS NOTE
Sepsis Note   Nessa Yeager 48 y o  female MRN: [id number]  Unit/Bed#: ED 08 Encounter: [id number]      qSOFA     [address] Name 07/06/20 1342 07/06/20 1037             Altered mental status GCS < 15  --  --       Respiratory Rate > / =22  0  0       Systolic BP < / =733  0  0       Q Sofa Score  0  0           Initial Sepsis Screening     Row Name 07/06/20 1314                Is the patient's history suggestive of a new or worsening infection? (!) Yes (Proceed)  -JG        Suspected source of infection  acute abdominal infection  -JG        Are two or more of the following signs & symptoms of infection both present and new to the patient?  --        Indicate SIRS criteria  Leukocytosis (WBC > 70028 IJL)  -JG        If the answer is yes to both questions, suspicion of sepsis is present  --        If severe sepsis is present AND tissue hypoperfusion perists in the hour after fluid resuscitation or lactate > 4, the patient meets criteria for SEPTIC SHOCK  --        Are any of the following organ dysfunction criteria present within 6 hours of suspected infection and SIRS criteria that are NOT considered to be chronic conditions?   No  -JG        Organ dysfunction  --        Date of presentation of severe sepsis  --        Time of presentation of severe sepsis  --        Tissue hypoperfusion persists in the hour after crystalloid fluid administration, evidenced, by either:  --        Was hypotension present within one hour of the conclusion of crystalloid fluid administration?  --        Date of presentation of septic shock  --        Time of presentation of septic shock  --          User Key  (r) = Recorded By, (t) = Taken By, (c) = Cosigned By    [address] Name Provider Type    [address]ara PA-C Physician Assistant

## 2020-07-06 NOTE — ED PROVIDER NOTES
History  Chief Complaint   Patient presents with    Abdominal Pain     PT presents w/ABD pain starting last night  +N/V     Patient presents with a 2 day history of abdominal pain  She states initially it started with lower abdominal cramping and she did observe related to her diverticulitis  She has a history of diverticulitis with perforation in the past   Now her pain is in her epigastric area and she describes it as sharp and shooting up into her chest   She has had a decreased appetite  She complains of nausea and 3 episodes of vomiting since yesterday  No diarrhea or constipation  No fever chills  She has had a tubal ligation in the past   Patient is a smoker  She denies alcohol or street drug use  She denies any fever chills  She denies any urinary symptoms of frequency, urgency, dysuria, hematuria  She denies any upper respiratory symptoms such as coughing, sore throat, postnasal drip, nasal congestion  Past medical history is positive for fibroids, hypertension, diverticulitis with perforation  History provided by:  Patient  Abdominal Pain   Pain location: lower abdominal pain-cramping, then epigastric pain that is sharp    Pain severity:  Moderate  Onset quality:  Gradual  Duration:  12 days  Timing:  Constant  Progression:  Worsening  Chronicity:  New  Context: diet changes    Context: not alcohol use, not awakening from sleep, not eating, not laxative use, not medication withdrawal, not previous surgeries, not recent illness, not recent sexual activity, not recent travel, not retching, not sick contacts, not suspicious food intake and not trauma    Relieved by:  Nothing  Worsened by:  Palpation and movement  Ineffective treatments:  None tried  Associated symptoms: anorexia, nausea and vomiting    Associated symptoms: no belching, no chest pain, no chills, no constipation, no cough, no diarrhea, no dysuria, no fatigue, no fever, no flatus, no hematemesis, no hematochezia, no hematuria, no melena, no shortness of breath and no sore throat    Risk factors: no alcohol abuse, no aspirin use, not elderly, has not had multiple surgeries, no NSAID use, not obese, not pregnant and no recent hospitalization        Prior to Admission Medications   Prescriptions Last Dose Informant Patient Reported? Taking? amLODIPine (NORVASC) 2 5 mg tablet   No No   Sig: Take 1 tablet by mouth daily for 20 days   Patient taking differently: Take 5 mg by mouth daily     diazepam (VALIUM) 5 mg tablet   No No   Sig: Take 1 tablet by mouth every 8 (eight) hours as needed for muscle spasms for up to 10 days      Facility-Administered Medications: None       Past Medical History:   Diagnosis Date    Fibroids     Hypertension        Past Surgical History:   Procedure Laterality Date    TUBAL LIGATION  1991       History reviewed  No pertinent family history  I have reviewed and agree with the history as documented  E-Cigarette/Vaping     E-Cigarette/Vaping Substances     Social History     Tobacco Use    Smoking status: Current Every Day Smoker    Smokeless tobacco: Current User   Substance Use Topics    Alcohol use: Yes     Comment: socially    Drug use: No       Review of Systems   Constitutional: Positive for activity change and appetite change  Negative for chills, fatigue and fever  HENT: Negative for congestion, ear discharge, ear pain, mouth sores, postnasal drip, rhinorrhea, sore throat and trouble swallowing  Eyes: Negative for pain, discharge, redness and itching  Respiratory: Negative for cough and shortness of breath  Cardiovascular: Negative for chest pain  Gastrointestinal: Positive for abdominal pain, anorexia, nausea and vomiting  Negative for constipation, diarrhea, flatus, hematemesis, hematochezia and melena  Genitourinary: Negative for difficulty urinating, dysuria, frequency, hematuria and urgency  Musculoskeletal: Negative for back pain     Skin: Negative for color change, pallor and rash    Psychiatric/Behavioral: Negative for confusion  All other systems reviewed and are negative  Physical Exam  Physical Exam   Constitutional: She is oriented to person, place, and time  She appears well-developed and well-nourished  Non-toxic appearance  She does not appear ill  No distress  HENT:   Head: Normocephalic  Cardiovascular: Normal rate, regular rhythm and normal heart sounds  No murmur heard  Pulmonary/Chest: Effort normal and breath sounds normal    Abdominal: Normal appearance and bowel sounds are normal  There is no splenomegaly or hepatomegaly  There is tenderness in the left upper quadrant and left lower quadrant  There is guarding  There is no rigidity and no rebound  Neurological: She is alert and oriented to person, place, and time  Skin: Skin is warm  Capillary refill takes less than 2 seconds  Psychiatric: She has a normal mood and affect  Her behavior is normal    Nursing note and vitals reviewed        Vital Signs  ED Triage Vitals [07/06/20 1037]   Temperature Pulse Respirations Blood Pressure SpO2   98 2 °F (36 8 °C) 92 18 (!) 180/77 97 %      Temp Source Heart Rate Source Patient Position - Orthostatic VS BP Location FiO2 (%)   Oral Monitor Sitting Left arm --      Pain Score       No Pain           Vitals:    07/06/20 1037 07/06/20 1342   BP: (!) 180/77 130/77   Pulse: 92 81   Patient Position - Orthostatic VS: Sitting Lying         Visual Acuity      ED Medications  Medications   amLODIPine (NORVASC) tablet 5 mg (5 mg Oral Not Given 7/6/20 1712)   diazepam (VALIUM) tablet 5 mg (has no administration in time range)   lactated ringers infusion (125 mL/hr Intravenous New Bag 7/6/20 1721)   ondansetron (ZOFRAN) injection 4 mg (has no administration in time range)   heparin (porcine) subcutaneous injection 5,000 Units (5,000 Units Subcutaneous Given 7/6/20 1721)   metroNIDAZOLE (FLAGYL) IVPB (premix) 500 mg 100 mL (0 mg Intravenous Stopped 7/6/20 1600)   ceFAZolin (ANCEF) IVPB (premix) 2,000 mg 50 mL (has no administration in time range)   oxyCODONE (ROXICODONE) IR tablet 5 mg (has no administration in time range)   oxyCODONE (ROXICODONE) immediate release tablet 10 mg (has no administration in time range)   HYDROmorphone (DILAUDID) injection 0 5 mg (has no administration in time range)   acetaminophen (TYLENOL) tablet 975 mg (has no administration in time range)   calcium carbonate (TUMS) chewable tablet 500 mg (has no administration in time range)   sodium chloride 0 9 % bolus 1,000 mL (0 mL Intravenous Stopped 7/6/20 1337)   iohexol (OMNIPAQUE) 350 MG/ML injection (MULTI-DOSE) 100 mL (100 mL Intravenous Given 7/6/20 1252)   ceFAZolin (ANCEF) IVPB (premix) 2,000 mg 50 mL (0 mg Intravenous Stopped 7/6/20 1456)       Diagnostic Studies  Results Reviewed     Procedure Component Value Units Date/Time    Platelet count [[id number]]  (Abnormal) Collected:  07/06/20 1537    Lab Status:  Final result Specimen:  Blood from Arm, Left Updated:  07/06/20 1549     Platelets 538 Thousands/uL      MPV 9 8 fL     Blood culture #2 [[id number]] Collected:  07/06/20 1537    Lab Status: In process Specimen:  Blood from Arm, Right Updated:  07/06/20 1544    Blood culture #1 [[id number]] Collected:  07/06/20 1537    Lab Status: In process Specimen:  Blood from Arm, Left Updated:  07/06/20 1544    [place] draw [[id number]] Collected:  07/06/20 1113    Lab Status:  Final result Specimen:  Blood from Arm, Right Updated:  07/06/20 1302    Narrative: The following orders were created for panel order [place] draw    Procedure                               Abnormality         Status                     ---------                               -----------         ------                     Radha Boom Top on ADUL[[id number]]                            Final result               Gold top on UVKR[[id number]]                                  Final result               Green / Yellow tube on AOQA[[id number]] Final result               Green / Black tube on SATK[[id number]]                       Final result               Lavender Top 3 ml on YOZO[[id number]]                        Final result                 Please view results for these tests on the individual orders      Comprehensive metabolic panel [[id number]]  (Abnormal) Collected:  07/06/20 1200    Lab Status:  Final result Specimen:  Blood from Arm, Right Updated:  07/06/20 1227     Sodium 142 mmol/L      Potassium 3 3 mmol/L      Chloride 101 mmol/L      CO2 30 mmol/L      ANION GAP 11 mmol/L      BUN 16 mg/dL      Creatinine 0 94 mg/dL      Glucose 110 mg/dL      Calcium 9 3 mg/dL      AST 17 U/L      ALT 34 U/L      Alkaline Phosphatase 127 U/L      Total Protein 8 5 g/dL      Albumin 3 0 g/dL      Total Bilirubin 0 42 mg/dL      eGFR 71 ml/min/1 73sq m     Narrative:       [place] guidelines for Chronic Kidney Disease (CKD):     Stage 1 with normal or high GFR (GFR > 90 mL/min/1 73 square meters)    Stage 2 Mild CKD (GFR = 60-89 mL/min/1 73 square meters)    Stage 3A Moderate CKD (GFR = 45-59 mL/min/1 73 square meters)    Stage 3B Moderate CKD (GFR = 30-44 mL/min/1 73 square meters)    Stage 4 Severe CKD (GFR = 15-29 mL/min/1 73 square meters)    Stage 5 End Stage CKD (GFR <15 mL/min/1 73 square meters)  Note: GFR calculation is accurate only with a steady state creatinine    Lipase [[id number]]  (Abnormal) Collected:  07/06/20 1200    Lab Status:  Final result Specimen:  Blood from Arm, Right Updated:  07/06/20 1227     Lipase 60 u/L     Protime-INR [[id number]]  (Normal) Collected:  07/06/20 1200    Lab Status:  Final result Specimen:  Blood from Arm, Right Updated:  07/06/20 1211     Protime 13 8 seconds      INR 1 12    APTT [[id number]]  (Normal) Collected:  07/06/20 1200    Lab Status:  Final result Specimen:  Blood from Arm, Right Updated:  07/06/20 1211     PTT 31 seconds     CBC and differential [[id number]]  (Abnormal) Collected:  07/06/20 1200    Lab Status:  Final result Specimen:  Blood from Arm, Right Updated:  07/06/20 1207     WBC 12 11 Thousand/uL      RBC 5 28 Million/uL      Hemoglobin 14 0 g/dL      Hematocrit 44 6 %      MCV 85 fL      MCH 26 5 pg      MCHC 31 4 g/dL      RDW 13 6 %      MPV 10 2 fL      Platelets 339 Thousands/uL      nRBC 0 /100 WBCs      Neutrophils Relative 76 %      Immat GRANS % 0 %      Lymphocytes Relative 15 %      Monocytes Relative 9 %      Eosinophils Relative 0 %      Basophils Relative 0 %      Neutrophils Absolute 9 10 Thousands/µL      Immature Grans Absolute 0 05 Thousand/uL      Lymphocytes Absolute 1 85 Thousands/µL      Monocytes Absolute 1 04 Thousand/µL      Eosinophils Absolute 0 02 Thousand/µL      Basophils Absolute 0 05 Thousands/µL                  CT abdomen pelvis with contrast   ED Interpretation by Jerrod Sarah PA-C (07/06 1321)   Acute sigmoid diverticulitis  Significant surrounding inflammatory phlegmon and multiple foci of extremity all gas are consistent with perforation  No drainable collection  Follow-up colonoscopy after appropriate medical therapy is recommended to exclude underlying neoplasm, if not recently performed  Marked secondary inflammation of an adjacent loop of small bowel  No bowel obstruction  Hepatic steatosis  Final Result by Patrick Cantu MD (07/06 1315)      Acute sigmoid diverticulitis  Significant surrounding inflammatory phlegmon and multiple foci of extramural gas are consistent with perforation  No drainable collection  Follow-up colonoscopy after appropriate medical therapy is recommended to exclude    underlying neoplasia, if not recently performed  Marked secondary inflammation of an adjacent loop of small bowel  No bowel obstruction  Hepatic steatosis  The study was marked in [facility] for immediate notification              Workstation performed: [id number] Procedures  Procedures         ED Course                   KITTY/DAST-10      Most Recent Value   How many times in the past year have you    Used an illegal drug or used a prescription medication for non-medical reasons? Never Filed at: 07/06/2020 1123              Initial Sepsis Screening     Row Name 07/06/20 1314                Is the patient's history suggestive of a new or worsening infection? (!) Yes (Proceed)  -JG        Suspected source of infection  acute abdominal infection  -JG        Are two or more of the following signs & symptoms of infection both present and new to the patient?  --        Indicate SIRS criteria  Leukocytosis (WBC > 80236 IJL)  -JG        If the answer is yes to both questions, suspicion of sepsis is present  --        If severe sepsis is present AND tissue hypoperfusion perists in the hour after fluid resuscitation or lactate > 4, the patient meets criteria for SEPTIC SHOCK  --        Are any of the following organ dysfunction criteria present within 6 hours of suspected infection and SIRS criteria that are NOT considered to be chronic conditions?   No  -JG        Organ dysfunction  --        Date of presentation of severe sepsis  --        Time of presentation of severe sepsis  --        Tissue hypoperfusion persists in the hour after crystalloid fluid administration, evidenced, by either:  --        Was hypotension present within one hour of the conclusion of crystalloid fluid administration?  --        Date of presentation of septic shock  --        Time of presentation of septic shock  --          User Key  (r) = Recorded By, (t) = Taken By, (c) = Cosigned By    [address] Name Provider Type    Kia Ibanez PA-C Physician Assistant                        MDM  Number of Diagnoses or Management Options  Abdominal pain: new and requires workup     Amount and/or Complexity of Data Reviewed  Clinical lab tests: ordered and reviewed  Tests in the radiology section of CPT®: ordered and reviewed  Tests in the medicine section of CPT®: ordered and reviewed    Risk of Complications, Morbidity, and/or Mortality  Presenting problems: high  Diagnostic procedures: high  Management options: high  General comments: Patient presented to the emergency room with complaints of abdominal pain that has been radiating up into her chest   She was seen and evaluated  Laboratory studies were taken and were reviewed  Her white cell count was 12,000 with no shift or bandemia  A CT scan of the abdomen and pelvis was performed which demonstrated acute diverticulitis with a perforation  The patient was admitted to surgery under Dr Nalani Gosselin run service for further evaluation and management  The patient was given a dose of Ancef and Flagyl for her acute diverticulitis  She was stable upon admission  Patient Progress  Patient progress: stable        Disposition  Final diagnoses:   Abdominal pain - Acute diverticulitis with perforation  Time reflects when diagnosis was documented in both MDM as applicable and the Disposition within this note     Time User Action Codes Description Comment    7/6/2020  1:21 PM Al Songster Add [K57 50] Diverticulosis of both small and large intestine without perforation or abscess     7/6/2020  1:22 PM Al Songster Remove [K57 50] Diverticulosis of both small and large intestine without perforation or abscess     7/6/2020  1:22 PM Al Songster Add [R10 9] Abdominal pain     7/6/2020  1:23 PM Stella Brittani [R10 9] Abdominal pain Acute diverticulitis with perforation  ED Disposition     ED Disposition Condition Date/Time Comment    Admit Stable Mon Jul 6, 2020  2:27 PM Case was discussed with Corrine Perez  and the patient's admission status was agreed to be Admission Status: inpatient status to the service of Dr Zachariah Antoine           Follow-up Information    None         Patient's Medications   Discharge Prescriptions    No medications on file No discharge procedures on file      PDMP Review     None          ED Provider  Electronically Signed by           Army Tonio PA-C  07/06/20 7187

## 2020-07-06 NOTE — H&P
H&P Exam - General Surgery   Jinny Mckeon 48 y o  female MRN: [id number]  Unit/Bed#: ED 08 Encounter: [id number]    Assessment/Plan     Assessment:  Jinny Mckeon is a 48 y o  female w/ PMHx HTN and now recurrent diverticulitis w/ contained perforation    Plan:  · Admit general surgery  · Clear liquid diet  · [email or web address]  · Ancef/Flagyl for total 10d course, can transition to Augmentin at time of discharge  · Pain control  · OOB/Ambulate  · DVT ppx    History of Present Illness     HPI:  Jinny Mckeon is a 48 y o  female who presents with weeks of intermittent abdominal pain, now 2 days of persistent/worsening pain  She describes it as sharp/cramping  She came to the ED at the insistence of her  and for persistent symptoms  She has been eating and drinking normally, denies fever/chills or n/v  Patient does report a prior hospitalization for diverticulitis which was managed medically  She denies previous colonoscopy  We discussed the spectrum of diverticular disease, management and future follow up w/ colonoscopy and possible elective resection down the road  She denies previous abdominal surgery, AP/AC therapy, bleeding/clotting disorder or allergy to medications  Review of Systems   Constitutional: Negative for chills and fever  HENT: Negative  Eyes: Negative  Respiratory: Negative  Cardiovascular: Negative  Gastrointestinal: Positive for abdominal pain  Negative for nausea and vomiting  Endocrine: Negative  Genitourinary: Negative  Musculoskeletal: Negative  Skin: Negative  Allergic/Immunologic: Negative  Neurological: Negative  Hematological: Negative  Psychiatric/Behavioral: Negative          Historical Information   Past Medical History:   Diagnosis Date    Fibroids     Hypertension      Past Surgical History:   Procedure Laterality Date    TUBAL LIGATION  1991     Social History   Social History     Substance and Sexual Activity   Alcohol Use Yes Comment: socially     Social History     Substance and Sexual Activity   Drug Use No     Social History     Tobacco Use   Smoking Status Current Every Day Smoker   Smokeless Tobacco Current User     E-Cigarette/Vaping     E-Cigarette/Vaping Substances     Family History: History reviewed  No pertinent family history  Meds/Allergies   all medications and allergies reviewed  No Known Allergies    Objective   First Vitals:   Blood Pressure: (!) 180/77 (07/06/20 1037)  Pulse: 92 (07/06/20 1037)  Temperature: 98 2 °F (36 8 °C) (07/06/20 1037)  Temp Source: Oral (07/06/20 1037)  Respirations: 18 (07/06/20 1037)  SpO2: 97 % (07/06/20 1037)    Current Vitals:   Blood Pressure: 130/77 (07/06/20 1342)  Pulse: 81 (07/06/20 1342)  Temperature: 98 2 °F (36 8 °C) (07/06/20 1037)  Temp Source: Oral (07/06/20 1037)  Respirations: 18 (07/06/20 1342)  SpO2: 98 % (07/06/20 1342)      Intake/Output Summary (Last 24 hours) at 7/6/2020 1420  Last data filed at 7/6/2020 1337  Gross per 24 hour   Intake 1000 ml   Output --   Net 1000 ml       Invasive Devices     Peripheral Intravenous Line            Peripheral IV 07/06/20 Right Antecubital less than 1 day                Physical Exam   Constitutional: She is oriented to person, place, and time  She appears well-developed and well-nourished  No distress  Nontoxic appearing, comfortable   HENT:   Head: Normocephalic and atraumatic  Eyes: Pupils are equal, round, and reactive to light  EOM are normal    Neck: Normal range of motion  Neck supple  Cardiovascular:   Warm/well perfused   Pulmonary/Chest: Effort normal  No respiratory distress  Abdominal: Soft  There is tenderness  There is no rebound and no guarding  Obese abdomen, tender mostly in the midline/supraumbilical region  Slightly distended and tympanic  Genitourinary:   Genitourinary Comments: Deferred   Musculoskeletal: She exhibits no edema, tenderness or deformity     Neurological: She is alert and oriented to person, place, and time  Skin: Skin is warm and dry  She is not diaphoretic  Psychiatric: She has a normal mood and affect  Her behavior is normal        Lab Results:   CBC:   Lab Results   Component Value Date    WBC 12 11 (H) 07/06/2020    HGB 14 0 07/06/2020    HCT 44 6 07/06/2020    MCV 85 07/06/2020     (H) 07/06/2020    MCH 26 5 (L) 07/06/2020    MCHC 31 4 07/06/2020    RDW 13 6 07/06/2020    MPV 10 2 07/06/2020    NRBC 0 07/06/2020   , CMP:   Lab Results   Component Value Date    SODIUM 142 07/06/2020    K 3 3 (L) 07/06/2020     07/06/2020    CO2 30 07/06/2020    BUN 16 07/06/2020    CREATININE 0 94 07/06/2020    CALCIUM 9 3 07/06/2020    AST 17 07/06/2020    ALT 34 07/06/2020    ALKPHOS 127 (H) 07/06/2020    EGFR 71 07/06/2020   , Coagulation:   Lab Results   Component Value Date    INR 1 12 07/06/2020     Imaging: I have personally reviewed pertinent reports  and I have personally reviewed pertinent films in PACS   CT abdomen pelvis with contrast   ED Interpretation by Army Tonio PA-C (07/06 1321)   Acute sigmoid diverticulitis  Significant surrounding inflammatory phlegmon and multiple foci of extremity all gas are consistent with perforation  No drainable collection  Follow-up colonoscopy after appropriate medical therapy is recommended to exclude underlying neoplasm, if not recently performed  Marked secondary inflammation of an adjacent loop of small bowel  No bowel obstruction  Hepatic steatosis  Final Result by Noni Presley MD (07/06 1315)      Acute sigmoid diverticulitis  Significant surrounding inflammatory phlegmon and multiple foci of extramural gas are consistent with perforation  No drainable collection  Follow-up colonoscopy after appropriate medical therapy is recommended to exclude    underlying neoplasia, if not recently performed  Marked secondary inflammation of an adjacent loop of small bowel  No bowel obstruction  Hepatic steatosis  The study was marked in [facility] for immediate notification              Workstation performed: [id number]           Code Status: Level 1 - Full Code  Advance Directive and Living Will:      Power of :    POLST:

## 2020-07-07 LAB
ANION GAP SERPL CALCULATED.3IONS-SCNC: 7 MMOL/L (ref 4–13)
BASOPHILS # BLD AUTO: 0.06 THOUSANDS/ΜL (ref 0–0.1)
BASOPHILS NFR BLD AUTO: 1 % (ref 0–1)
BUN SERPL-MCNC: 12 MG/DL (ref 5–25)
CALCIUM SERPL-MCNC: 8.1 MG/DL (ref 8.3–10.1)
CHLORIDE SERPL-SCNC: 104 MMOL/L (ref 100–108)
CO2 SERPL-SCNC: 29 MMOL/L (ref 21–32)
CREAT SERPL-MCNC: 0.68 MG/DL (ref 0.6–1.3)
EOSINOPHIL # BLD AUTO: 0.11 THOUSAND/ΜL (ref 0–0.61)
EOSINOPHIL NFR BLD AUTO: 2 % (ref 0–6)
ERYTHROCYTE [DISTWIDTH] IN BLOOD BY AUTOMATED COUNT: 13.6 % (ref 11.6–15.1)
GFR SERPL CREATININE-BSD FRML MDRD: 102 ML/MIN/1.73SQ M
GLUCOSE SERPL-MCNC: 105 MG/DL (ref 65–140)
HCT VFR BLD AUTO: 39.7 % (ref 34.8–46.1)
HGB BLD-MCNC: 12.5 G/DL (ref 11.5–15.4)
IMM GRANULOCYTES # BLD AUTO: 0.03 THOUSAND/UL (ref 0–0.2)
IMM GRANULOCYTES NFR BLD AUTO: 1 % (ref 0–2)
LYMPHOCYTES # BLD AUTO: 1.36 THOUSANDS/ΜL (ref 0.6–4.47)
LYMPHOCYTES NFR BLD AUTO: 22 % (ref 14–44)
MCH RBC QN AUTO: 26.9 PG (ref 26.8–34.3)
MCHC RBC AUTO-ENTMCNC: 31.5 G/DL (ref 31.4–37.4)
MCV RBC AUTO: 86 FL (ref 82–98)
MONOCYTES # BLD AUTO: 0.5 THOUSAND/ΜL (ref 0.17–1.22)
MONOCYTES NFR BLD AUTO: 8 % (ref 4–12)
NEUTROPHILS # BLD AUTO: 4.11 THOUSANDS/ΜL (ref 1.85–7.62)
NEUTS SEG NFR BLD AUTO: 66 % (ref 43–75)
NRBC BLD AUTO-RTO: 0 /100 WBCS
PLATELET # BLD AUTO: 342 THOUSANDS/UL (ref 149–390)
PMV BLD AUTO: 9.9 FL (ref 8.9–12.7)
POTASSIUM SERPL-SCNC: 3.2 MMOL/L (ref 3.5–5.3)
RBC # BLD AUTO: 4.64 MILLION/UL (ref 3.81–5.12)
SODIUM SERPL-SCNC: 140 MMOL/L (ref 136–145)
WBC # BLD AUTO: 6.17 THOUSAND/UL (ref 4.31–10.16)

## 2020-07-07 PROCEDURE — 80048 BASIC METABOLIC PNL TOTAL CA: CPT | Performed by: SURGERY

## 2020-07-07 PROCEDURE — 85025 COMPLETE CBC W/AUTO DIFF WBC: CPT | Performed by: SURGERY

## 2020-07-07 PROCEDURE — 36415 COLL VENOUS BLD VENIPUNCTURE: CPT | Performed by: SURGERY

## 2020-07-07 RX ORDER — SODIUM CHLORIDE 9 MG/ML
125 INJECTION, SOLUTION INTRAVENOUS CONTINUOUS
Status: DISCONTINUED | OUTPATIENT
Start: 2020-07-07 | End: 2020-07-07

## 2020-07-07 RX ORDER — ACETAMINOPHEN 325 MG/1
650 TABLET ORAL EVERY 4 HOURS PRN
Status: DISCONTINUED | OUTPATIENT
Start: 2020-07-07 | End: 2020-07-09 | Stop reason: HOSPADM

## 2020-07-07 RX ORDER — POTASSIUM CHLORIDE 20 MEQ/1
40 TABLET, EXTENDED RELEASE ORAL ONCE
Status: COMPLETED | OUTPATIENT
Start: 2020-07-07 | End: 2020-07-07

## 2020-07-07 RX ADMIN — OXYCODONE HYDROCHLORIDE 10 MG: 10 TABLET ORAL at 09:19

## 2020-07-07 RX ADMIN — AMLODIPINE BESYLATE 5 MG: 5 TABLET ORAL at 08:46

## 2020-07-07 RX ADMIN — HEPARIN SODIUM 5000 UNITS: 5000 INJECTION INTRAVENOUS; SUBCUTANEOUS at 13:09

## 2020-07-07 RX ADMIN — CEFAZOLIN SODIUM 2000 MG: 2 SOLUTION INTRAVENOUS at 13:09

## 2020-07-07 RX ADMIN — CEFAZOLIN SODIUM 2000 MG: 2 SOLUTION INTRAVENOUS at 21:38

## 2020-07-07 RX ADMIN — SODIUM CHLORIDE, SODIUM LACTATE, POTASSIUM CHLORIDE, AND CALCIUM CHLORIDE 125 ML/HR: .6; .31; .03; .02 INJECTION, SOLUTION INTRAVENOUS at 06:57

## 2020-07-07 RX ADMIN — POTASSIUM CHLORIDE 40 MEQ: 1500 TABLET, EXTENDED RELEASE ORAL at 11:37

## 2020-07-07 RX ADMIN — METRONIDAZOLE 500 MG: 500 INJECTION, SOLUTION INTRAVENOUS at 06:15

## 2020-07-07 RX ADMIN — ONDANSETRON 4 MG: 2 INJECTION INTRAMUSCULAR; INTRAVENOUS at 14:18

## 2020-07-07 RX ADMIN — SODIUM CHLORIDE, SODIUM LACTATE, POTASSIUM CHLORIDE, AND CALCIUM CHLORIDE 75 ML/HR: .6; .31; .03; .02 INJECTION, SOLUTION INTRAVENOUS at 18:28

## 2020-07-07 RX ADMIN — SODIUM CHLORIDE 125 ML/HR: 0.9 INJECTION, SOLUTION INTRAVENOUS at 05:51

## 2020-07-07 RX ADMIN — HEPARIN SODIUM 5000 UNITS: 5000 INJECTION INTRAVENOUS; SUBCUTANEOUS at 05:54

## 2020-07-07 RX ADMIN — HEPARIN SODIUM 5000 UNITS: 5000 INJECTION INTRAVENOUS; SUBCUTANEOUS at 21:38

## 2020-07-07 RX ADMIN — METRONIDAZOLE 500 MG: 500 INJECTION, SOLUTION INTRAVENOUS at 14:18

## 2020-07-07 RX ADMIN — METRONIDAZOLE 500 MG: 500 INJECTION, SOLUTION INTRAVENOUS at 22:35

## 2020-07-07 RX ADMIN — CEFAZOLIN SODIUM 2000 MG: 2 SOLUTION INTRAVENOUS at 05:51

## 2020-07-07 NOTE — PLAN OF CARE
Problem: Nutrition/Hydration-ADULT  Goal: Nutrient/Hydration intake appropriate for improving, restoring or maintaining nutritional needs  Description  Monitor and assess patient's nutrition/hydration status for malnutrition  Collaborate with interdisciplinary team and initiate plan and interventions as ordered  Monitor patient's weight and dietary intake as ordered or per policy  Utilize nutrition screening tool and intervene as necessary  Determine patient's food preferences and provide high-protein, high-caloric foods as appropriate       INTERVENTIONS:  - Monitor oral intake, urinary output, labs, and treatment plans  - Assess nutrition and hydration status and recommend course of action  - Evaluate amount of meals eaten  - Assist patient with eating if necessary   - Allow adequate time for meals  - Recommend/ encourage appropriate diets, oral nutritional supplements, and vitamin/mineral supplements  - Order, calculate, and assess calorie counts as needed  - Recommend, monitor, and adjust tube feedings and TPN/PPN based on assessed needs  - Assess need for intravenous fluids  - Provide specific nutrition/hydration education as appropriate  - Include patient/family/caregiver in decisions related to nutrition  Outcome: Progressing     Problem: DISCHARGE PLANNING - CARE MANAGEMENT  Goal: Discharge to post-acute care or home with appropriate resources  Description  INTERVENTIONS:  - Conduct assessment to determine patient/family and health care team treatment goals, and need for post-acute services based on payer coverage, community resources, and patient preferences, and barriers to discharge  - Address psychosocial, clinical, and financial barriers to discharge as identified in assessment in conjunction with the patient/family and health care team  - Arrange appropriate level of post-acute services according to patients   needs and preference and payer coverage in collaboration with the physician and health care team  - Communicate with and update the patient/family, physician, and health care team regarding progress on the discharge plan  - Arrange appropriate transportation to post-acute venues  Outcome: Progressing     Problem: PAIN - ADULT  Goal: Verbalizes/displays adequate comfort level or baseline comfort level  Description  Interventions:  - Encourage patient to monitor pain and request assistance  - Assess pain using appropriate pain scale  - Administer analgesics based on type and severity of pain and evaluate response  - Implement non-pharmacological measures as appropriate and evaluate response  - Consider cultural and social influences on pain and pain management  - Notify physician/advanced practitioner if interventions unsuccessful or patient reports new pain  Outcome: Progressing     Problem: INFECTION - ADULT  Goal: Absence or prevention of progression during hospitalization  Description  INTERVENTIONS:  - Assess and monitor for signs and symptoms of infection  - Monitor lab/diagnostic results  - Monitor all insertion sites, i e  indwelling lines, tubes, and drains  - Monitor endotracheal if appropriate and nasal secretions for changes in amount and color  - [place] appropriate cooling/warming therapies per order  - Administer medications as ordered  - Instruct and encourage patient and family to use good hand hygiene technique  - Identify and instruct in appropriate isolation precautions for identified infection/condition  Outcome: Progressing  Goal: Absence of fever/infection during neutropenic period  Description  INTERVENTIONS:  - Monitor WBC    Outcome: Progressing     Problem: SAFETY ADULT  Goal: Patient will remain free of falls  Description  INTERVENTIONS:  - Assess patient frequently for physical needs  -  Identify cognitive and physical deficits and behaviors that affect risk of falls    -  [place] fall precautions as indicated by assessment   - Educate patient/family on patient safety including physical limitations  - Instruct patient to call for assistance with activity based on assessment  - Modify environment to reduce risk of injury  - Consider OT/PT consult to assist with strengthening/mobility  Outcome: Progressing  Goal: Maintain or return to baseline ADL function  Description  INTERVENTIONS:  -  Assess patient's ability to carry out ADLs; assess patient's baseline for ADL function and identify physical deficits which impact ability to perform ADLs (bathing, care of mouth/teeth, toileting, grooming, dressing, etc )  - Assess/evaluate cause of self-care deficits   - Assess range of motion  - Assess patient's mobility; develop plan if impaired  - Assess patient's need for assistive devices and provide as appropriate  - Encourage maximum independence but intervene and supervise when necessary  - Involve family in performance of ADLs  - Assess for home care needs following discharge   - Consider OT consult to assist with ADL evaluation and planning for discharge  - Provide patient education as appropriate  Outcome: Progressing  Goal: Maintain or return mobility status to optimal level  Description  INTERVENTIONS:  - Assess patient's baseline mobility status (ambulation, transfers, stairs, etc )    - Identify cognitive and physical deficits and behaviors that affect mobility  - Identify mobility aids required to assist with transfers and/or ambulation (gait belt, sit-to-stand, lift, walker, cane, etc )  - [place] fall precautions as indicated by assessment  - Record patient progress and toleration of activity level on Mobility SBAR; progress patient to next Phase/Stage  - Instruct patient to call for assistance with activity based on assessment  - Consider rehabilitation consult to assist with strengthening/weightbearing, etc   Outcome: Progressing     Problem: DISCHARGE PLANNING  Goal: Discharge to home or other facility with appropriate resources  Description  INTERVENTIONS:  - Identify barriers to discharge w/patient and caregiver  - Arrange for needed discharge resources and transportation as appropriate  - Identify discharge learning needs (meds, wound care, etc )  - Arrange for interpretive services to assist at discharge as needed  - Refer to Case Management Department for coordinating discharge planning if the patient needs post-hospital services based on physician/advanced practitioner order or complex needs related to functional status, cognitive ability, or social support system  Outcome: Progressing     Problem: Knowledge Deficit  Goal: Patient/family/caregiver demonstrates understanding of disease process, treatment plan, medications, and discharge instructions  Description  Complete learning assessment and assess knowledge base    Interventions:  - Provide teaching at level of understanding  - Provide teaching via preferred learning methods  Outcome: Progressing     Problem: GASTROINTESTINAL - ADULT  Goal: Minimal or absence of nausea and/or vomiting  Description  INTERVENTIONS:  - Administer IV fluids if ordered to ensure adequate hydration  - Maintain NPO status until nausea and vomiting are resolved  - Nasogastric tube if ordered  - Administer ordered antiemetic medications as needed  - Provide nonpharmacologic comfort measures as appropriate  - Advance diet as tolerated, if ordered  - Consider nutrition services referral to assist patient with adequate nutrition and appropriate food choices  Outcome: Progressing  Goal: Maintains or returns to baseline bowel function  Description  INTERVENTIONS:  - Assess bowel function  - Encourage oral fluids to ensure adequate hydration  - Administer IV fluids if ordered to ensure adequate hydration  - Administer ordered medications as needed  - Encourage mobilization and activity  - Consider nutritional services referral to assist patient with adequate nutrition and appropriate food choices  Outcome: Progressing  Goal: Maintains adequate nutritional intake  Description  INTERVENTIONS:  - Monitor percentage of each meal consumed  - Identify factors contributing to decreased intake, treat as appropriate  - Assist with meals as needed  - Monitor I&O, weight, and lab values if indicated  - Obtain nutrition services referral as needed  Outcome: Progressing

## 2020-07-07 NOTE — UTILIZATION REVIEW
Initial Clinical Review    Admission: Date/Time/Statement: Admission Orders (From admission, onward)     Ordered        07/06/20 1409  Inpatient Admission  Once                   Orders Placed This Encounter   Procedures    Inpatient Admission     Standing Status:   Standing     Number of Occurrences:   1     Order Specific Question:   Admitting Physician     Answer:   Coby Wheeler [[id number]]     Order Specific Question:   Level of Care     Answer:   Med Surg [[id number]]     Order Specific Question:   Estimated length of stay     Answer:   More than 2 Midnights     Order Specific Question:   Certification     Answer:   I certify that inpatient services are medically necessary for this patient for a duration of greater than two midnights  See H&P and MD Progress Notes for additional information about the patient's course of treatment  ED Arrival Information     Expected Arrival Acuity Means of Arrival Escorted By Service Admission Type    - 7/6/2020 10:10 Urgent Walk-In Self Surgery-General Urgent    Arrival Complaint    Abdominal Pain, Vomiting        Chief Complaint   Patient presents with    Abdominal Pain     PT presents w/ABD pain starting last night  +N/V     Assessment/Plan: this is a 48 Year old female from home to ED admitted inpatient due to recurrent diverticulitis with contained perforation  Presented due to worsening abdominal pain for last 2 days, started lower abdomen  And epigastric shooting into her chest   Has nausea and 3 episodes of vomiting with poor appetite since yesterday  On exam tenderness LUQ and LLQ abdomen with guarding  Blood cultures done  K 3 3  Wbc 12 11    CT abdomen showed diverticulitis with perforation  Plan is IV antibiotics, IVF, pain control in progress    On 7/7/2020:  Pain is improving, patient feels abdomen less distended  Clears with toast started  IVF and IV antibiotics continue       ED Triage Vitals [07/06/20 1037]   Temperature Pulse Respirations Blood Pressure SpO2   98 2 °F (36 8 °C) 92 18 (!) 180/77 97 %      Temp Source Heart Rate Source Patient Position - Orthostatic VS BP Location FiO2 (%)   Oral Monitor Sitting Left arm --      Pain Score       No Pain        Wt Readings from Last 1 Encounters:   05/13/18 92 3 kg (203 lb 7 8 oz)     Additional Vital Signs:   07/07/20 0725  98 1 °F (36 7 °C)  83  16  139/84  94 %  None (Room air)  --   07/07/20 0550  --  88  16  128/79  95 %  None (Room air)  --   07/06/20 2221  --  83  18  137/65  96 %  None (Room air           07/05 0701  07/06 0700 07/06 0701  07/07 0700 07/07 0701 07/08 0700   I V   1000    IV Piggyback  1450    Total Intake  2450    Net  +2450        Pertinent Labs/Diagnostic Test Results:   7/6/2020 CT abdomen Acute sigmoid diverticulitis  Significant surrounding inflammatory phlegmon and multiple foci of extramural gas are consistent with perforation  No drainable collection  Follow-up colonoscopy after appropriate medical therapy is recommended to exclude   underlying neoplasia, if not recently performed  Marked secondary inflammation of an adjacent loop of small bowel  No bowel obstruction    Hepatic steatosis    7/6/2020 EKG Normal sinus rhythm  Low voltage QRS  Nonspecific T wave abnormality  Abnormal ECG  When compared with ECG of 27-JAN-2017 23:20,  Nonspecific T wave abnormality now evident in Inferior leads  Nonspecific T wave abnormality now evident in Anterolateral leads      Results from last 7 days   Lab Units 07/07/20  0554 07/06/20  1537 07/06/20  1200   WBC Thousand/uL 6 17  --  12 11*   HEMOGLOBIN g/dL 12 5  --  14 0   HEMATOCRIT % 39 7  --  44 6   PLATELETS Thousands/uL 342 415* 481*   NEUTROS ABS Thousands/µL 4 11  --  9 10*         Results from last 7 days   Lab Units 07/07/20  0554 07/06/20  1200   SODIUM mmol/L 140 142   POTASSIUM mmol/L 3 2* 3 3*   CHLORIDE mmol/L 104 101   CO2 mmol/L 29 30   ANION GAP mmol/L 7 11   BUN mg/dL 12 16   CREATININE mg/dL 0 68 0 94   EGFR ml/min/1 73sq m 102 71   CALCIUM mg/dL 8 1* 9 3     Results from last 7 days   Lab Units 07/06/20  1200   AST U/L 17   ALT U/L 34   ALK PHOS U/L 127*   TOTAL PROTEIN g/dL 8 5*   ALBUMIN g/dL 3 0*   TOTAL BILIRUBIN mg/dL 0 42     Results from last 7 days   Lab Units 07/07/20  0554 07/06/20  1200   GLUCOSE RANDOM mg/dL 105 110     Results from last 7 days   Lab Units 07/06/20  1200   PROTIME seconds 13 8   INR  1 12   PTT seconds 31     Results from last 7 days   Lab Units 07/06/20  1200   LIPASE u/L 60*     Results from last 7 days   Lab Units 07/06/20  1537   BLOOD CULTURE  Received in Microbiology Lab  Culture in Progress  Received in Microbiology Lab  Culture in Progress       ED Treatment:   Medication Administration from 07/06/2020 1010 to 07/07/2020 1057       Date/Time Order Dose Route Action Comments     07/06/2020 1159 sodium chloride 0 9 % bolus 1,000 mL 1,000 mL Intravenous New Bag      07/06/2020 1342 ceFAZolin (ANCEF) IVPB (premix) 2,000 mg 50 mL 2,000 mg Intravenous New Bag      07/06/2020 1456 metroNIDAZOLE (FLAGYL) IVPB (premix) 500 mg 100 mL 0 mg Intravenous Hold      07/07/2020 0846 amLODIPine (NORVASC) tablet 5 mg 5 mg Oral Given      07/07/2020 0657 lactated ringers infusion 125 mL/hr Intravenous New Bag      07/06/2020 1721 lactated ringers infusion 125 mL/hr Intravenous New Bag      07/07/2020 0554 heparin (porcine) subcutaneous injection 5,000 Units 5,000 Units Subcutaneous Given      07/06/2020 2242 heparin (porcine) subcutaneous injection 5,000 Units 5,000 Units Subcutaneous Given      07/06/2020 1721 heparin (porcine) subcutaneous injection 5,000 Units 5,000 Units Subcutaneous Given      07/07/2020 0615 metroNIDAZOLE (FLAGYL) IVPB (premix) 500 mg 100 mL 500 mg Intravenous New Bag      07/06/2020 2242 metroNIDAZOLE (FLAGYL) IVPB (premix) 500 mg 100 mL 500 mg Intravenous New Bag      07/06/2020 1535 metroNIDAZOLE (FLAGYL) IVPB (premix) 500 mg 100 mL 500 mg Intravenous New Bag      07/07/2020 0551 ceFAZolin (ANCEF) IVPB (premix) 2,000 mg 50 mL 2,000 mg Intravenous New Bag      07/06/2020 2136 ceFAZolin (ANCEF) IVPB (premix) 2,000 mg 50 mL 2,000 mg Intravenous New Bag      07/07/2020 0919 oxyCODONE (ROXICODONE) immediate release tablet 10 mg 10 mg Oral Given      07/07/2020 0551 sodium chloride 0 9 % infusion 125 mL/hr Intravenous New Bag         Past Medical History:   Diagnosis Date    Fibroids     Hypertension      Present on Admission:  **None**      Admitting Diagnosis: Abdominal pain [R10 9]  Age/Sex: 48 y o  female  Admission Orders: 7/6/2020 1409 inpatient   Scheduled Medications:  Medications:  amLODIPine 5 mg Oral Daily   cefazolin 2,000 mg Intravenous Q8H   heparin (porcine) 5,000 Units Subcutaneous Q8H [address]   metroNIDAZOLE 500 mg Intravenous Q8H     Continuous IV Infusions:  lactated ringers 125 mL/hr Intravenous Continuous     PRN Meds:  acetaminophen 975 mg Oral Q6H PRN   calcium carbonate 500 mg Oral TID PRN   diazepam 5 mg Oral Q8H PRN   HYDROmorphone 0 5 mg Intravenous Q3H PRN   ondansetron 4 mg Intravenous Q6H PRN   oxyCODONE - used x 1  10 mg Oral Q4H PRN   oxyCODONE 5 mg Oral Q4H PRN     On 7/7/2020 clear liquid crackers toast    Network Utilization Review Department  [email or web address]  ATTENTION: Please call with any questions or concerns to [phone] and carefully listen to the prompts so that you are directed to the right person  All voicemails are confidential   Gabriela Varghese all requests for admission clinical reviews, approved or denied determinations and any other requests to dedicated fax number below belonging to the campus where the patient is receiving treatment   List of dedicated fax numbers for the Facilities:  FACILITY NAME UR FAX NUMBER   ADMISSION DENIALS (Administrative/Medical Necessity) [phone]   [address]  (Maternity/NICU/Pediatrics) [phone]   Lauren Singh [address] [facility] [phone]   Elier Faria Debby Santiago [phone]   [address]  [place] [address] [facility] [phone]   [place] [facility] [phone] [address] [facility]  [phone] [address] [facility] [address] [facility] [phone]

## 2020-07-07 NOTE — PLAN OF CARE
Problem: Nutrition/Hydration-ADULT  Goal: Nutrient/Hydration intake appropriate for improving, restoring or maintaining nutritional needs  Description  Monitor and assess patient's nutrition/hydration status for malnutrition  Collaborate with interdisciplinary team and initiate plan and interventions as ordered  Monitor patient's weight and dietary intake as ordered or per policy  Utilize nutrition screening tool and intervene as necessary  Determine patient's food preferences and provide high-protein, high-caloric foods as appropriate       INTERVENTIONS:  - Monitor oral intake, urinary output, labs, and treatment plans  - Assess nutrition and hydration status and recommend course of action  - Evaluate amount of meals eaten  - Assist patient with eating if necessary   - Allow adequate time for meals  - Recommend/ encourage appropriate diets, oral nutritional supplements, and vitamin/mineral supplements  - Order, calculate, and assess calorie counts as needed  - Recommend, monitor, and adjust tube feedings and TPN/PPN based on assessed needs  - Assess need for intravenous fluids  - Provide specific nutrition/hydration education as appropriate  - Include patient/family/caregiver in decisions related to nutrition  Outcome: Progressing     Problem: DISCHARGE PLANNING - CARE MANAGEMENT  Goal: Discharge to post-acute care or home with appropriate resources  Description  INTERVENTIONS:  - Conduct assessment to determine patient/family and health care team treatment goals, and need for post-acute services based on payer coverage, community resources, and patient preferences, and barriers to discharge  - Address psychosocial, clinical, and financial barriers to discharge as identified in assessment in conjunction with the patient/family and health care team  - Arrange appropriate level of post-acute services according to patients   needs and preference and payer coverage in collaboration with the physician and health care team  - Communicate with and update the patient/family, physician, and health care team regarding progress on the discharge plan  - Arrange appropriate transportation to post-acute venues  Outcome: Progressing     Problem: PAIN - ADULT  Goal: Verbalizes/displays adequate comfort level or baseline comfort level  Description  Interventions:  - Encourage patient to monitor pain and request assistance  - Assess pain using appropriate pain scale  - Administer analgesics based on type and severity of pain and evaluate response  - Implement non-pharmacological measures as appropriate and evaluate response  - Consider cultural and social influences on pain and pain management  - Notify physician/advanced practitioner if interventions unsuccessful or patient reports new pain  Outcome: Progressing     Problem: INFECTION - ADULT  Goal: Absence or prevention of progression during hospitalization  Description  INTERVENTIONS:  - Assess and monitor for signs and symptoms of infection  - Monitor lab/diagnostic results  - Monitor all insertion sites, i e  indwelling lines, tubes, and drains  - Monitor endotracheal if appropriate and nasal secretions for changes in amount and color  - [place] appropriate cooling/warming therapies per order  - Administer medications as ordered  - Instruct and encourage patient and family to use good hand hygiene technique  - Identify and instruct in appropriate isolation precautions for identified infection/condition  Outcome: Progressing  Goal: Absence of fever/infection during neutropenic period  Description  INTERVENTIONS:  - Monitor WBC    Outcome: Progressing     Problem: SAFETY ADULT  Goal: Patient will remain free of falls  Description  INTERVENTIONS:  - Assess patient frequently for physical needs  -  Identify cognitive and physical deficits and behaviors that affect risk of falls    -  [place] fall precautions as indicated by assessment   - Educate patient/family on patient safety including physical limitations  - Instruct patient to call for assistance with activity based on assessment  - Modify environment to reduce risk of injury  - Consider OT/PT consult to assist with strengthening/mobility  Outcome: Progressing  Goal: Maintain or return to baseline ADL function  Description  INTERVENTIONS:  -  Assess patient's ability to carry out ADLs; assess patient's baseline for ADL function and identify physical deficits which impact ability to perform ADLs (bathing, care of mouth/teeth, toileting, grooming, dressing, etc )  - Assess/evaluate cause of self-care deficits   - Assess range of motion  - Assess patient's mobility; develop plan if impaired  - Assess patient's need for assistive devices and provide as appropriate  - Encourage maximum independence but intervene and supervise when necessary  - Involve family in performance of ADLs  - Assess for home care needs following discharge   - Consider OT consult to assist with ADL evaluation and planning for discharge  - Provide patient education as appropriate  Outcome: Progressing  Goal: Maintain or return mobility status to optimal level  Description  INTERVENTIONS:  - Assess patient's baseline mobility status (ambulation, transfers, stairs, etc )    - Identify cognitive and physical deficits and behaviors that affect mobility  - Identify mobility aids required to assist with transfers and/or ambulation (gait belt, sit-to-stand, lift, walker, cane, etc )  - [place] fall precautions as indicated by assessment  - Record patient progress and toleration of activity level on Mobility SBAR; progress patient to next Phase/Stage  - Instruct patient to call for assistance with activity based on assessment  - Consider rehabilitation consult to assist with strengthening/weightbearing, etc   Outcome: Progressing     Problem: DISCHARGE PLANNING  Goal: Discharge to home or other facility with appropriate resources  Description  INTERVENTIONS:  - Identify barriers to discharge w/patient and caregiver  - Arrange for needed discharge resources and transportation as appropriate  - Identify discharge learning needs (meds, wound care, etc )  - Arrange for interpretive services to assist at discharge as needed  - Refer to Case Management Department for coordinating discharge planning if the patient needs post-hospital services based on physician/advanced practitioner order or complex needs related to functional status, cognitive ability, or social support system  Outcome: Progressing     Problem: Knowledge Deficit  Goal: Patient/family/caregiver demonstrates understanding of disease process, treatment plan, medications, and discharge instructions  Description  Complete learning assessment and assess knowledge base    Interventions:  - Provide teaching at level of understanding  - Provide teaching via preferred learning methods  Outcome: Progressing     Problem: GASTROINTESTINAL - ADULT  Goal: Minimal or absence of nausea and/or vomiting  Description  INTERVENTIONS:  - Administer IV fluids if ordered to ensure adequate hydration  - Maintain NPO status until nausea and vomiting are resolved  - Nasogastric tube if ordered  - Administer ordered antiemetic medications as needed  - Provide nonpharmacologic comfort measures as appropriate  - Advance diet as tolerated, if ordered  - Consider nutrition services referral to assist patient with adequate nutrition and appropriate food choices  Outcome: Progressing  Goal: Maintains or returns to baseline bowel function  Description  INTERVENTIONS:  - Assess bowel function  - Encourage oral fluids to ensure adequate hydration  - Administer IV fluids if ordered to ensure adequate hydration  - Administer ordered medications as needed  - Encourage mobilization and activity  - Consider nutritional services referral to assist patient with adequate nutrition and appropriate food choices  Outcome: Progressing  Goal: Maintains adequate nutritional intake  Description  INTERVENTIONS:  - Monitor percentage of each meal consumed  - Identify factors contributing to decreased intake, treat as appropriate  - Assist with meals as needed  - Monitor I&O, weight, and lab values if indicated  - Obtain nutrition services referral as needed  Outcome: Progressing

## 2020-07-07 NOTE — PROGRESS NOTES
Progress Note - General Surgery   Mary Turner 48 y o  female MRN: [id number]  Unit/Bed#: ED 09 Encounter: [id number]    Assessment/Plan:  48 y o  female with diverticulitis with contained perforation    Clears with toast    Ancef/flagyl (7/6-7/15), transition to Augmentin at discharge  Analgsia  Encourage ambulation  SQH/SCD for DVT ppx      Subjective/Objective     Subjective: Doing well, pain is improving, feels less distended than yesterday      Objective:     Vitals: Temp:  [98 2 °F (36 8 °C)] 98 2 °F (36 8 °C)  HR:  [81-92] 83  Resp:  [18] 18  BP: (130-180)/(65-77) 137/65  There is no height or weight on file to calculate BMI  I/O       07/05 0701 - 07/06 0700 07/06 0701 - 07/07 0700    IV Piggyback  1200    Total Intake  1200    Net  +1200              Passing gas, no BM per patient    Physical Exam:  GEN: NAD  HEENT: MMM  CV: RRR  Lung: Normal effort  Ab: Soft, mildly tender, mildly distended  Extrem: No CCE  Neuro: A+Ox3    Lab, Imaging and other studies:   I have personally reviewed pertinent reports    , CBC with diff:   Lab Results   Component Value Date    WBC 12 11 (H) 07/06/2020    HGB 14 0 07/06/2020    HCT 44 6 07/06/2020    MCV 85 07/06/2020     (H) 07/06/2020    MCH 26 5 (L) 07/06/2020    MCHC 31 4 07/06/2020    RDW 13 6 07/06/2020    MPV 9 8 07/06/2020    NRBC 0 07/06/2020   , BMP/CMP:   Lab Results   Component Value Date    SODIUM 142 07/06/2020    K 3 3 (L) 07/06/2020     07/06/2020    CO2 30 07/06/2020    BUN 16 07/06/2020    CREATININE 0 94 07/06/2020    CALCIUM 9 3 07/06/2020    AST 17 07/06/2020    ALT 34 07/06/2020    ALKPHOS 127 (H) 07/06/2020    EGFR 71 07/06/2020     VTE Pharmacologic Prophylaxis: Heparin  VTE Mechanical Prophylaxis: sequential compression device

## 2020-07-07 NOTE — PLAN OF CARE
Problem: Nutrition/Hydration-ADULT  Goal: Nutrient/Hydration intake appropriate for improving, restoring or maintaining nutritional needs  Description  Monitor and assess patient's nutrition/hydration status for malnutrition  Collaborate with interdisciplinary team and initiate plan and interventions as ordered  Monitor patient's weight and dietary intake as ordered or per policy  Utilize nutrition screening tool and intervene as necessary  Determine patient's food preferences and provide high-protein, high-caloric foods as appropriate       INTERVENTIONS:  - Monitor oral intake, urinary output, labs, and treatment plans  - Assess nutrition and hydration status and recommend course of action  - Evaluate amount of meals eaten  - Assist patient with eating if necessary   - Allow adequate time for meals  - Recommend/ encourage appropriate diets, oral nutritional supplements, and vitamin/mineral supplements  - Order, calculate, and assess calorie counts as needed  - Recommend, monitor, and adjust tube feedings and TPN/PPN based on assessed needs  - Assess need for intravenous fluids  - Provide specific nutrition/hydration education as appropriate  - Include patient/family/caregiver in decisions related to nutrition  7/7/2020 1821 by Amandeep Mckeon RN  Outcome: Progressing  7/7/2020 1821 by Amandeep Mckeon RN  Outcome: Progressing     Problem: DISCHARGE PLANNING - CARE MANAGEMENT  Goal: Discharge to post-acute care or home with appropriate resources  Description  INTERVENTIONS:  - Conduct assessment to determine patient/family and health care team treatment goals, and need for post-acute services based on payer coverage, community resources, and patient preferences, and barriers to discharge  - Address psychosocial, clinical, and financial barriers to discharge as identified in assessment in conjunction with the patient/family and health care team  - Arrange appropriate level of post-acute services according to patients   needs and preference and payer coverage in collaboration with the physician and health care team  - Communicate with and update the patient/family, physician, and health care team regarding progress on the discharge plan  - Arrange appropriate transportation to post-acute venues  7/7/2020 1821 by Jennifer Hanley RN  Outcome: Progressing  7/7/2020 1821 by Jennifer Hanley RN  Outcome: Progressing     Problem: PAIN - ADULT  Goal: Verbalizes/displays adequate comfort level or baseline comfort level  Description  Interventions:  - Encourage patient to monitor pain and request assistance  - Assess pain using appropriate pain scale  - Administer analgesics based on type and severity of pain and evaluate response  - Implement non-pharmacological measures as appropriate and evaluate response  - Consider cultural and social influences on pain and pain management  - Notify physician/advanced practitioner if interventions unsuccessful or patient reports new pain  7/7/2020 1821 by Jennifer Hanley RN  Outcome: Progressing  7/7/2020 1821 by Jennifer Hanley RN  Outcome: Progressing     Problem: INFECTION - ADULT  Goal: Absence or prevention of progression during hospitalization  Description  INTERVENTIONS:  - Assess and monitor for signs and symptoms of infection  - Monitor lab/diagnostic results  - Monitor all insertion sites, i e  indwelling lines, tubes, and drains  - Monitor endotracheal if appropriate and nasal secretions for changes in amount and color  - [place] appropriate cooling/warming therapies per order  - Administer medications as ordered  - Instruct and encourage patient and family to use good hand hygiene technique  - Identify and instruct in appropriate isolation precautions for identified infection/condition  7/7/2020 1821 by Jennifer Hanley RN  Outcome: Progressing  7/7/2020 1821 by Jennifer Hanley RN  Outcome: Progressing  Goal: Absence of fever/infection during neutropenic period  Description  INTERVENTIONS:  - Monitor WBC    7/7/2020 1821 by Shanique Duenas RN  Outcome: Progressing  7/7/2020 1821 by Shanique Duenas RN  Outcome: Progressing     Problem: SAFETY ADULT  Goal: Patient will remain free of falls  Description  INTERVENTIONS:  - Assess patient frequently for physical needs  -  Identify cognitive and physical deficits and behaviors that affect risk of falls    -  [place] fall precautions as indicated by assessment   - Educate patient/family on patient safety including physical limitations  - Instruct patient to call for assistance with activity based on assessment  - Modify environment to reduce risk of injury  - Consider OT/PT consult to assist with strengthening/mobility  7/7/2020 1821 by Shanique Duenas RN  Outcome: Progressing  7/7/2020 1821 by Shanique Duenas RN  Outcome: Progressing  Goal: Maintain or return to baseline ADL function  Description  INTERVENTIONS:  -  Assess patient's ability to carry out ADLs; assess patient's baseline for ADL function and identify physical deficits which impact ability to perform ADLs (bathing, care of mouth/teeth, toileting, grooming, dressing, etc )  - Assess/evaluate cause of self-care deficits   - Assess range of motion  - Assess patient's mobility; develop plan if impaired  - Assess patient's need for assistive devices and provide as appropriate  - Encourage maximum independence but intervene and supervise when necessary  - Involve family in performance of ADLs  - Assess for home care needs following discharge   - Consider OT consult to assist with ADL evaluation and planning for discharge  - Provide patient education as appropriate  7/7/2020 1821 by Shanique Duenas RN  Outcome: Progressing  7/7/2020 1821 by Shanique Duenas RN  Outcome: Progressing  Goal: Maintain or return mobility status to optimal level  Description  INTERVENTIONS:  - Assess patient's baseline mobility status (ambulation, transfers, stairs, etc )    - Identify cognitive and physical deficits and behaviors that affect mobility  - Identify mobility aids required to assist with transfers and/or ambulation (gait belt, sit-to-stand, lift, walker, cane, etc )  - [place] fall precautions as indicated by assessment  - Record patient progress and toleration of activity level on Mobility SBAR; progress patient to next Phase/Stage  - Instruct patient to call for assistance with activity based on assessment  - Consider rehabilitation consult to assist with strengthening/weightbearing, etc   7/7/2020 1821 by Panda Puckett RN  Outcome: Progressing  7/7/2020 1821 by Panda Puckett RN  Outcome: Progressing     Problem: DISCHARGE PLANNING  Goal: Discharge to home or other facility with appropriate resources  Description  INTERVENTIONS:  - Identify barriers to discharge w/patient and caregiver  - Arrange for needed discharge resources and transportation as appropriate  - Identify discharge learning needs (meds, wound care, etc )  - Arrange for interpretive services to assist at discharge as needed  - Refer to Case Management Department for coordinating discharge planning if the patient needs post-hospital services based on physician/advanced practitioner order or complex needs related to functional status, cognitive ability, or social support system  7/7/2020 1821 by Panda Puckett RN  Outcome: Progressing  7/7/2020 1821 by Panda Puckett RN  Outcome: Progressing     Problem: Knowledge Deficit  Goal: Patient/family/caregiver demonstrates understanding of disease process, treatment plan, medications, and discharge instructions  Description  Complete learning assessment and assess knowledge base    Interventions:  - Provide teaching at level of understanding  - Provide teaching via preferred learning methods  7/7/2020 1821 by Panda Puckett RN  Outcome: Progressing  7/7/2020 1821 by Panda Puckett RN  Outcome: Progressing     Problem: GASTROINTESTINAL - ADULT  Goal: Minimal or absence of nausea and/or vomiting  Description  INTERVENTIONS:  - Administer IV fluids if ordered to ensure adequate hydration  - Maintain NPO status until nausea and vomiting are resolved  - Nasogastric tube if ordered  - Administer ordered antiemetic medications as needed  - Provide nonpharmacologic comfort measures as appropriate  - Advance diet as tolerated, if ordered  - Consider nutrition services referral to assist patient with adequate nutrition and appropriate food choices  7/7/2020 1821 by Earnest Tanner RN  Outcome: Progressing  7/7/2020 1821 by Earnest Tanner RN  Outcome: Progressing  Goal: Maintains or returns to baseline bowel function  Description  INTERVENTIONS:  - Assess bowel function  - Encourage oral fluids to ensure adequate hydration  - Administer IV fluids if ordered to ensure adequate hydration  - Administer ordered medications as needed  - Encourage mobilization and activity  - Consider nutritional services referral to assist patient with adequate nutrition and appropriate food choices  7/7/2020 1821 by Earnest Tanner RN  Outcome: Progressing  7/7/2020 1821 by Earnest Tanner RN  Outcome: Progressing  Goal: Maintains adequate nutritional intake  Description  INTERVENTIONS:  - Monitor percentage of each meal consumed  - Identify factors contributing to decreased intake, treat as appropriate  - Assist with meals as needed  - Monitor I&O, weight, and lab values if indicated  - Obtain nutrition services referral as needed  7/7/2020 1821 by Earnest Tanner RN  Outcome: Progressing  7/7/2020 1821 by Earnest Tanner RN  Outcome: Progressing

## 2020-07-08 LAB
ANION GAP SERPL CALCULATED.3IONS-SCNC: 7 MMOL/L (ref 4–13)
BASOPHILS # BLD AUTO: 0.05 THOUSANDS/ΜL (ref 0–0.1)
BASOPHILS NFR BLD AUTO: 1 % (ref 0–1)
BUN SERPL-MCNC: 6 MG/DL (ref 5–25)
CALCIUM SERPL-MCNC: 8.2 MG/DL (ref 8.3–10.1)
CHLORIDE SERPL-SCNC: 102 MMOL/L (ref 100–108)
CO2 SERPL-SCNC: 30 MMOL/L (ref 21–32)
CREAT SERPL-MCNC: 0.77 MG/DL (ref 0.6–1.3)
EOSINOPHIL # BLD AUTO: 0.15 THOUSAND/ΜL (ref 0–0.61)
EOSINOPHIL NFR BLD AUTO: 2 % (ref 0–6)
ERYTHROCYTE [DISTWIDTH] IN BLOOD BY AUTOMATED COUNT: 13.3 % (ref 11.6–15.1)
GFR SERPL CREATININE-BSD FRML MDRD: 90 ML/MIN/1.73SQ M
GLUCOSE SERPL-MCNC: 101 MG/DL (ref 65–140)
HCT VFR BLD AUTO: 44 % (ref 34.8–46.1)
HGB BLD-MCNC: 13.6 G/DL (ref 11.5–15.4)
IMM GRANULOCYTES # BLD AUTO: 0.04 THOUSAND/UL (ref 0–0.2)
IMM GRANULOCYTES NFR BLD AUTO: 1 % (ref 0–2)
LYMPHOCYTES # BLD AUTO: 1.67 THOUSANDS/ΜL (ref 0.6–4.47)
LYMPHOCYTES NFR BLD AUTO: 26 % (ref 14–44)
MCH RBC QN AUTO: 26.7 PG (ref 26.8–34.3)
MCHC RBC AUTO-ENTMCNC: 30.9 G/DL (ref 31.4–37.4)
MCV RBC AUTO: 86 FL (ref 82–98)
MONOCYTES # BLD AUTO: 0.51 THOUSAND/ΜL (ref 0.17–1.22)
MONOCYTES NFR BLD AUTO: 8 % (ref 4–12)
NEUTROPHILS # BLD AUTO: 4.1 THOUSANDS/ΜL (ref 1.85–7.62)
NEUTS SEG NFR BLD AUTO: 62 % (ref 43–75)
NRBC BLD AUTO-RTO: 0 /100 WBCS
PLATELET # BLD AUTO: 347 THOUSANDS/UL (ref 149–390)
PMV BLD AUTO: 9.7 FL (ref 8.9–12.7)
POTASSIUM SERPL-SCNC: 3.3 MMOL/L (ref 3.5–5.3)
RBC # BLD AUTO: 5.09 MILLION/UL (ref 3.81–5.12)
SODIUM SERPL-SCNC: 139 MMOL/L (ref 136–145)
WBC # BLD AUTO: 6.52 THOUSAND/UL (ref 4.31–10.16)

## 2020-07-08 PROCEDURE — 85025 COMPLETE CBC W/AUTO DIFF WBC: CPT | Performed by: SURGERY

## 2020-07-08 PROCEDURE — 80048 BASIC METABOLIC PNL TOTAL CA: CPT | Performed by: SURGERY

## 2020-07-08 RX ORDER — SODIUM CHLORIDE, SODIUM LACTATE, POTASSIUM CHLORIDE, CALCIUM CHLORIDE 600; 310; 30; 20 MG/100ML; MG/100ML; MG/100ML; MG/100ML
50 INJECTION, SOLUTION INTRAVENOUS CONTINUOUS
Status: DISCONTINUED | OUTPATIENT
Start: 2020-07-08 | End: 2020-07-09

## 2020-07-08 RX ORDER — POTASSIUM CHLORIDE 20 MEQ/1
40 TABLET, EXTENDED RELEASE ORAL ONCE
Status: COMPLETED | OUTPATIENT
Start: 2020-07-08 | End: 2020-07-08

## 2020-07-08 RX ORDER — POTASSIUM CHLORIDE 20 MEQ/1
80 TABLET, EXTENDED RELEASE ORAL ONCE
Status: DISCONTINUED | OUTPATIENT
Start: 2020-07-08 | End: 2020-07-08

## 2020-07-08 RX ADMIN — HEPARIN SODIUM 5000 UNITS: 5000 INJECTION INTRAVENOUS; SUBCUTANEOUS at 13:03

## 2020-07-08 RX ADMIN — HEPARIN SODIUM 5000 UNITS: 5000 INJECTION INTRAVENOUS; SUBCUTANEOUS at 21:21

## 2020-07-08 RX ADMIN — POTASSIUM CHLORIDE 40 MEQ: 1500 TABLET, EXTENDED RELEASE ORAL at 11:16

## 2020-07-08 RX ADMIN — CEFAZOLIN SODIUM 2000 MG: 2 SOLUTION INTRAVENOUS at 04:59

## 2020-07-08 RX ADMIN — CEFAZOLIN SODIUM 2000 MG: 2 SOLUTION INTRAVENOUS at 21:21

## 2020-07-08 RX ADMIN — HEPARIN SODIUM 5000 UNITS: 5000 INJECTION INTRAVENOUS; SUBCUTANEOUS at 05:00

## 2020-07-08 RX ADMIN — POTASSIUM CHLORIDE 40 MEQ: 1500 TABLET, EXTENDED RELEASE ORAL at 08:19

## 2020-07-08 RX ADMIN — AMLODIPINE BESYLATE 5 MG: 5 TABLET ORAL at 08:19

## 2020-07-08 RX ADMIN — METRONIDAZOLE 500 MG: 500 INJECTION, SOLUTION INTRAVENOUS at 06:00

## 2020-07-08 RX ADMIN — METRONIDAZOLE 500 MG: 500 INJECTION, SOLUTION INTRAVENOUS at 22:14

## 2020-07-08 RX ADMIN — CEFAZOLIN SODIUM 2000 MG: 2 SOLUTION INTRAVENOUS at 13:03

## 2020-07-08 RX ADMIN — SODIUM CHLORIDE, SODIUM LACTATE, POTASSIUM CHLORIDE, AND CALCIUM CHLORIDE 50 ML/HR: .6; .31; .03; .02 INJECTION, SOLUTION INTRAVENOUS at 11:17

## 2020-07-08 RX ADMIN — METRONIDAZOLE 500 MG: 500 INJECTION, SOLUTION INTRAVENOUS at 13:51

## 2020-07-08 NOTE — PROGRESS NOTES
Progress Note - General Surgery   Michelle Garcia 48 y o  female MRN: [id number]  Unit/Bed#: S -64 Encounter: [id number]    Assessment:  Michelle Garcia is a 48 y o  female w/ acute diverticulitis -- improving    Plan:  · Advance to surg soft diet  · Discontinue IVF  Replete lytes  · Transition to Augmentin to complete x10d course of therapy  · Patient should follow up for colonoscopy in 4-6 weeks  · Pain control  · OOB/ambulate  · DVT ppx    Subjective/Objective     Subjective: No acute events  Feeling better  Objective:     Blood pressure 126/69, pulse 78, temperature 98 3 °F (36 8 °C), temperature source Oral, resp  rate 18, last menstrual period 05/06/2020, SpO2 90 %  ,There is no height or weight on file to calculate BMI  Intake/Output Summary (Last 24 hours) at 7/8/2020 0408  Last data filed at 7/8/2020 0725  Gross per 24 hour   Intake 2533 33 ml   Output 1200 ml   Net 1333 33 ml       Invasive Devices     Peripheral Intravenous Line            Peripheral IV 07/06/20 Right Antecubital 1 day                Physical Exam:   GEN: NAD  HEENT: MMM  CV: warm/well perfused  Lung: normal effort  Ab: Soft, NT/ND  Extrem: No CCE  Neuro:  A+Ox3, motor and sensation grossly intact    Lab, Imaging and other studies:  CBC:   Lab Results   Component Value Date    WBC 6 52 07/08/2020    HGB 13 6 07/08/2020    HCT 44 0 07/08/2020    MCV 86 07/08/2020     07/08/2020    MCH 26 7 (L) 07/08/2020    MCHC 30 9 (L) 07/08/2020    RDW 13 3 07/08/2020    MPV 9 7 07/08/2020    NRBC 0 07/08/2020   , CMP:   Lab Results   Component Value Date    SODIUM 139 07/08/2020    K 3 3 (L) 07/08/2020     07/08/2020    CO2 30 07/08/2020    BUN 6 07/08/2020    CREATININE 0 77 07/08/2020    CALCIUM 8 2 (L) 07/08/2020    EGFR 90 07/08/2020   , Coagulation: No results found for: PT, INR, APTT  VTE Pharmacologic Prophylaxis: Heparin  VTE Mechanical Prophylaxis: sequential compression device

## 2020-07-08 NOTE — PLAN OF CARE
Problem: Nutrition/Hydration-ADULT  Goal: Nutrient/Hydration intake appropriate for improving, restoring or maintaining nutritional needs  Description  Monitor and assess patient's nutrition/hydration status for malnutrition  Collaborate with interdisciplinary team and initiate plan and interventions as ordered  Monitor patient's weight and dietary intake as ordered or per policy  Utilize nutrition screening tool and intervene as necessary  Determine patient's food preferences and provide high-protein, high-caloric foods as appropriate       INTERVENTIONS:  - Monitor oral intake, urinary output, labs, and treatment plans  - Assess nutrition and hydration status and recommend course of action  - Evaluate amount of meals eaten  - Assist patient with eating if necessary   - Allow adequate time for meals  - Recommend/ encourage appropriate diets, oral nutritional supplements, and vitamin/mineral supplements  - Order, calculate, and assess calorie counts as needed  - Recommend, monitor, and adjust tube feedings and TPN/PPN based on assessed needs  - Assess need for intravenous fluids  - Provide specific nutrition/hydration education as appropriate  - Include patient/family/caregiver in decisions related to nutrition  Outcome: Progressing     Problem: DISCHARGE PLANNING - CARE MANAGEMENT  Goal: Discharge to post-acute care or home with appropriate resources  Description  INTERVENTIONS:  - Conduct assessment to determine patient/family and health care team treatment goals, and need for post-acute services based on payer coverage, community resources, and patient preferences, and barriers to discharge  - Address psychosocial, clinical, and financial barriers to discharge as identified in assessment in conjunction with the patient/family and health care team  - Arrange appropriate level of post-acute services according to patients   needs and preference and payer coverage in collaboration with the physician and health care team  - Communicate with and update the patient/family, physician, and health care team regarding progress on the discharge plan  - Arrange appropriate transportation to post-acute venues  Outcome: Progressing     Problem: PAIN - ADULT  Goal: Verbalizes/displays adequate comfort level or baseline comfort level  Description  Interventions:  - Encourage patient to monitor pain and request assistance  - Assess pain using appropriate pain scale  - Administer analgesics based on type and severity of pain and evaluate response  - Implement non-pharmacological measures as appropriate and evaluate response  - Consider cultural and social influences on pain and pain management  - Notify physician/advanced practitioner if interventions unsuccessful or patient reports new pain  Outcome: Progressing     Problem: INFECTION - ADULT  Goal: Absence or prevention of progression during hospitalization  Description  INTERVENTIONS:  - Assess and monitor for signs and symptoms of infection  - Monitor lab/diagnostic results  - Monitor all insertion sites, i e  indwelling lines, tubes, and drains  - Monitor endotracheal if appropriate and nasal secretions for changes in amount and color  - [place] appropriate cooling/warming therapies per order  - Administer medications as ordered  - Instruct and encourage patient and family to use good hand hygiene technique  - Identify and instruct in appropriate isolation precautions for identified infection/condition  Outcome: Progressing  Goal: Absence of fever/infection during neutropenic period  Description  INTERVENTIONS:  - Monitor WBC    Outcome: Progressing     Problem: SAFETY ADULT  Goal: Patient will remain free of falls  Description  INTERVENTIONS:  - Assess patient frequently for physical needs  -  Identify cognitive and physical deficits and behaviors that affect risk of falls    -  [place] fall precautions as indicated by assessment   - Educate patient/family on patient safety including physical limitations  - Instruct patient to call for assistance with activity based on assessment  - Modify environment to reduce risk of injury  - Consider OT/PT consult to assist with strengthening/mobility  Outcome: Progressing  Goal: Maintain or return to baseline ADL function  Description  INTERVENTIONS:  -  Assess patient's ability to carry out ADLs; assess patient's baseline for ADL function and identify physical deficits which impact ability to perform ADLs (bathing, care of mouth/teeth, toileting, grooming, dressing, etc )  - Assess/evaluate cause of self-care deficits   - Assess range of motion  - Assess patient's mobility; develop plan if impaired  - Assess patient's need for assistive devices and provide as appropriate  - Encourage maximum independence but intervene and supervise when necessary  - Involve family in performance of ADLs  - Assess for home care needs following discharge   - Consider OT consult to assist with ADL evaluation and planning for discharge  - Provide patient education as appropriate  Outcome: Progressing  Goal: Maintain or return mobility status to optimal level  Description  INTERVENTIONS:  - Assess patient's baseline mobility status (ambulation, transfers, stairs, etc )    - Identify cognitive and physical deficits and behaviors that affect mobility  - Identify mobility aids required to assist with transfers and/or ambulation (gait belt, sit-to-stand, lift, walker, cane, etc )  - [place] fall precautions as indicated by assessment  - Record patient progress and toleration of activity level on Mobility SBAR; progress patient to next Phase/Stage  - Instruct patient to call for assistance with activity based on assessment  - Consider rehabilitation consult to assist with strengthening/weightbearing, etc   Outcome: Progressing     Problem: DISCHARGE PLANNING  Goal: Discharge to home or other facility with appropriate resources  Description  INTERVENTIONS:  - Identify barriers to discharge w/patient and caregiver  - Arrange for needed discharge resources and transportation as appropriate  - Identify discharge learning needs (meds, wound care, etc )  - Arrange for interpretive services to assist at discharge as needed  - Refer to Case Management Department for coordinating discharge planning if the patient needs post-hospital services based on physician/advanced practitioner order or complex needs related to functional status, cognitive ability, or social support system  Outcome: Progressing     Problem: Knowledge Deficit  Goal: Patient/family/caregiver demonstrates understanding of disease process, treatment plan, medications, and discharge instructions  Description  Complete learning assessment and assess knowledge base    Interventions:  - Provide teaching at level of understanding  - Provide teaching via preferred learning methods  Outcome: Progressing     Problem: GASTROINTESTINAL - ADULT  Goal: Minimal or absence of nausea and/or vomiting  Description  INTERVENTIONS:  - Administer IV fluids if ordered to ensure adequate hydration  - Maintain NPO status until nausea and vomiting are resolved  - Nasogastric tube if ordered  - Administer ordered antiemetic medications as needed  - Provide nonpharmacologic comfort measures as appropriate  - Advance diet as tolerated, if ordered  - Consider nutrition services referral to assist patient with adequate nutrition and appropriate food choices  Outcome: Progressing  Goal: Maintains or returns to baseline bowel function  Description  INTERVENTIONS:  - Assess bowel function  - Encourage oral fluids to ensure adequate hydration  - Administer IV fluids if ordered to ensure adequate hydration  - Administer ordered medications as needed  - Encourage mobilization and activity  - Consider nutritional services referral to assist patient with adequate nutrition and appropriate food choices  Outcome: Progressing  Goal: Maintains adequate nutritional intake  Description  INTERVENTIONS:  - Monitor percentage of each meal consumed  - Identify factors contributing to decreased intake, treat as appropriate  - Assist with meals as needed  - Monitor I&O, weight, and lab values if indicated  - Obtain nutrition services referral as needed  Outcome: Progressing

## 2020-07-08 NOTE — UTILIZATION REVIEW
Continued Stay Review    Date: 7/8                         Current Patient Class: Inpatient  Current Level of Care: med surg    HPI:50 y o  female initially admitted on 7/7 to ED admitted inpatient due to recurrent diverticulitis with contained perforation  Presented due to worsening abdominal pain for last 2 days, started lower abdomen  And epigastric shooting into her chest   Has nausea and 3 episodes of vomiting with poor appetite since 7/6  Assessment/Plan:   7/8 Surgery: Still with pain in left upper quadrant; passing flatus  Adv to full liquids, toast, crackers  Transition to Augmentin to complete x10 day course  DC IVF & replete lytes  Pain control  Unless clinically changes plan for DC on 7/9 with planned DC on Augmentin & Flagyl for 1 week, soft diet while on antibiotics with high fiber diet to follow  Return to Office in 1 mo with colonoscopy in 8 weeks       Pertinent Labs/Diagnostic Results:       Results from last 7 days   Lab Units 07/08/20  0503 07/07/20  0554 07/06/20  1537 07/06/20  1200   WBC Thousand/uL 6 52 6 17  --  12 11*   HEMOGLOBIN g/dL 13 6 12 5  --  14 0   HEMATOCRIT % 44 0 39 7  --  44 6   PLATELETS Thousands/uL 347 342 415* 481*   NEUTROS ABS Thousands/µL 4 10 4 11  --  9 10*         Results from last 7 days   Lab Units 07/08/20  0503 07/07/20  0554 07/06/20  1200   SODIUM mmol/L 139 140 142   POTASSIUM mmol/L 3 3* 3 2* 3 3*   CHLORIDE mmol/L 102 104 101   CO2 mmol/L 30 29 30   ANION GAP mmol/L 7 7 11   BUN mg/dL 6 12 16   CREATININE mg/dL 0 77 0 68 0 94   EGFR ml/min/1 73sq m 90 102 71   CALCIUM mg/dL 8 2* 8 1* 9 3     Results from last 7 days   Lab Units 07/06/20  1200   AST U/L 17   ALT U/L 34   ALK PHOS U/L 127*   TOTAL PROTEIN g/dL 8 5*   ALBUMIN g/dL 3 0*   TOTAL BILIRUBIN mg/dL 0 42         Results from last 7 days   Lab Units 07/08/20  0503 07/07/20  0554 07/06/20  1200   GLUCOSE RANDOM mg/dL 101 105 110           Results from last 7 days   Lab Units 07/06/20  1200   PROTIME seconds 13 8   INR  1 12   PTT seconds 31       Results from last 7 days   Lab Units 07/06/20  1200   LIPASE u/L 60*     Results from last 7 days   Lab Units 07/06/20  1537   BLOOD CULTURE  No Growth at 24 hrs  No Growth at 24 hrs  Vital Signs:   Date/Time  Temp  Pulse  Resp  BP  MAP (mmHg)  SpO2  O2 Device  Patient Position - Orthostatic VS   07/08/20 1455  98 6 °F (37 °C)  70  --  130/68  91  96 %  None (Room air)  Lying   07/08/20 0725  98 3 °F (36 8 °C)  78  18  126/69  --  90 %  None (Room air)  Lying         Medications:   Scheduled Medications:    Medications:  amLODIPine 5 mg Oral Daily   cefazolin 2,000 mg Intravenous Q8H   heparin (porcine) 5,000 Units Subcutaneous Q8H [address]   metroNIDAZOLE 500 mg Intravenous Q8H     Continuous IV Infusions:    lactated ringers 50 mL/hr Intravenous Continuous     PRN Meds:    acetaminophen 650 mg Oral Q4H PRN   calcium carbonate 500 mg Oral TID PRN   diazepam 5 mg Oral Q8H PRN   HYDROmorphone 0 5 mg Intravenous Q3H PRN   ondansetron 4 mg Intravenous Q6H PRN   oxyCODONE 10 mg Oral Q4H PRN   oxyCODONE 5 mg Oral Q4H PRN       Discharge Plan: tbd   Network Utilization Review Department  [email or web address]  ATTENTION: Please call with any questions or concerns to [phone] and carefully listen to the prompts so that you are directed to the right person  All voicemails are confidential   [facility] all requests for admission clinical reviews, approved or denied determinations and any other requests to dedicated fax number below belonging to the campus where the patient is receiving treatment   List of dedicated fax numbers for the Facilities:  FACILITY NAME UR FAX NUMBER   ADMISSION DENIALS (Administrative/Medical Necessity) [phone]   [address] (Maternity/NICU/Pediatrics) [phone]   Earl Mccray [phone]   [place]icia Notice [address] [facility] [phone]   [facility] [address] [facility] [phone]   Abdelrahman  [address] [phone]   [address] [facility], [facility]  [phone]   [address] [place] [address] [phone]

## 2020-07-09 VITALS
HEART RATE: 76 BPM | TEMPERATURE: 98.3 F | OXYGEN SATURATION: 95 % | RESPIRATION RATE: 18 BRPM | SYSTOLIC BLOOD PRESSURE: 128 MMHG | DIASTOLIC BLOOD PRESSURE: 77 MMHG

## 2020-07-09 RX ORDER — AMOXICILLIN AND CLAVULANATE POTASSIUM 500; 125 MG/1; MG/1
1 TABLET, FILM COATED ORAL EVERY 8 HOURS SCHEDULED
Qty: 21 TABLET | Refills: 0 | Status: SHIPPED | OUTPATIENT
Start: 2020-07-09 | End: 2020-07-16

## 2020-07-09 RX ORDER — METRONIDAZOLE 500 MG/1
500 TABLET ORAL EVERY 8 HOURS SCHEDULED
Qty: 21 TABLET | Refills: 0 | Status: SHIPPED | OUTPATIENT
Start: 2020-07-09 | End: 2020-07-16

## 2020-07-09 RX ADMIN — CEFAZOLIN SODIUM 2000 MG: 2 SOLUTION INTRAVENOUS at 05:35

## 2020-07-09 RX ADMIN — METRONIDAZOLE 500 MG: 500 INJECTION, SOLUTION INTRAVENOUS at 06:23

## 2020-07-09 RX ADMIN — AMLODIPINE BESYLATE 5 MG: 5 TABLET ORAL at 10:01

## 2020-07-09 NOTE — DISCHARGE SUMMARY
Discharge Summary - Prem Glez 48 y o  female MRN: [id number]    Unit/Bed#: S -15 Encounter: [id number]    Admission Date: 7/6/2020   Discharge Date: 07/09/20    Admitting Diagnosis:   Abdominal pain [R10 9]    Discharge Diagnoses: Principal Problem:    Perforation of sigmoid colon due to diverticulitis      Consultations: general surgery    Procedures Performed: n/a    Hospital Course: 49 y/o female presented to ED 7/6 with 2 days of worsening abdominal pain  Ed workup of Ct scan revealing acute sigmoid diverticulitis with surrounding inflammatory phlegmon and extramural gas consistent with perforation, WBC 12  Patient admitted to surgical service on clear liquid diet, IFV support, and IV antibiotics  Over the following two days of her hospital course she reported improving pain with return of normal bowel function, tolerated diet and was advanced to soft foods on 7/8  On exam 7/9 patient denied any pain and is tolerating soft diet without abdominal complaints  Leukocytosis normalized to 6  Case discussed with attending physician, cleared for discharge at this time  Will be prescribed outpatient course of Augmentin and Flagyl totaling 10 days overall antibiotic therapy, follow-up in office in 1 month to discuss colonoscopy  Condition at Discharge: good     Discharge instructions/Information to patient and family:   See after visit summary for information provided to patient and family  Provisions for Follow-Up Care:  See after visit summary for information related to follow-up care and any pertinent home health orders  Disposition: Home    Planned Readmission: No    Discharge Statement   I spent 30 minutes discharging the patient  This time was spent on the day of discharge  I had direct contact with the patient on the day of discharge  Additional documentation is required if more than 30 minutes were spent on discharge       Discharge Medications:  See after visit summary for reconciled discharge medications provided to patient and family

## 2020-07-09 NOTE — PROGRESS NOTES
Progress Note - General Surgery   Karlo Arnold 48 y o  female MRN: [id number]  Unit/Bed#: S -01 Encounter: [id number]    Assessment/Plan:  48 y o  female with improving diverticulitis     Likely d/c today  Augmentin through 7/10  F/u for colonoscopy in 4-6 weeks  Analgesia  Ambulate  SQH for DVT ppx    Subjective/Objective     Subjective: Doing well this morning      Objective:     Vitals: Temp:  [98 3 °F (36 8 °C)-98 7 °F (37 1 °C)] 98 7 °F (37 1 °C)  HR:  [65-78] 65  Resp:  [18] 18  BP: (126-135)/(68-78) 135/78  There is no height or weight on file to calculate BMI  I/O       07/07 0701 - 07/08 0700 07/08 0701 - 07/09 0700    P  O  240 290    I V  1903 3     IV Piggyback 150     Total Intake 2293 3 290    Urine 1200 300    Stool  0    Total Output 1200 300    Net +1093 3 -10          Unmeasured Stool Occurrence  1 x          Physical Exam:  GEN: NAD  HEENT: MMM  CV: RRR  Lung: Normal effort  Ab: Soft, NT/ND  Extrem: No CCE  Neuro: A+Ox3    Lab, Imaging and other studies:   I have personally reviewed pertinent reports    , CBC with diff:   Lab Results   Component Value Date    WBC 6 52 07/08/2020    HGB 13 6 07/08/2020    HCT 44 0 07/08/2020    MCV 86 07/08/2020     07/08/2020    MCH 26 7 (L) 07/08/2020    MCHC 30 9 (L) 07/08/2020    RDW 13 3 07/08/2020    MPV 9 7 07/08/2020    NRBC 0 07/08/2020   , BMP/CMP:   Lab Results   Component Value Date    SODIUM 139 07/08/2020    K 3 3 (L) 07/08/2020     07/08/2020    CO2 30 07/08/2020    BUN 6 07/08/2020    CREATININE 0 77 07/08/2020    CALCIUM 8 2 (L) 07/08/2020    EGFR 90 07/08/2020     VTE Pharmacologic Prophylaxis: Heparin  VTE Mechanical Prophylaxis: sequential compression device

## 2020-07-09 NOTE — PLAN OF CARE
Problem: Nutrition/Hydration-ADULT  Goal: Nutrient/Hydration intake appropriate for improving, restoring or maintaining nutritional needs  Description  Monitor and assess patient's nutrition/hydration status for malnutrition  Collaborate with interdisciplinary team and initiate plan and interventions as ordered  Monitor patient's weight and dietary intake as ordered or per policy  Utilize nutrition screening tool and intervene as necessary  Determine patient's food preferences and provide high-protein, high-caloric foods as appropriate       INTERVENTIONS:  - Monitor oral intake, urinary output, labs, and treatment plans  - Assess nutrition and hydration status and recommend course of action  - Evaluate amount of meals eaten  - Assist patient with eating if necessary   - Allow adequate time for meals  - Recommend/ encourage appropriate diets, oral nutritional supplements, and vitamin/mineral supplements  - Order, calculate, and assess calorie counts as needed  - Recommend, monitor, and adjust tube feedings and TPN/PPN based on assessed needs  - Assess need for intravenous fluids  - Provide specific nutrition/hydration education as appropriate  - Include patient/family/caregiver in decisions related to nutrition  Outcome: Progressing     Problem: DISCHARGE PLANNING - CARE MANAGEMENT  Goal: Discharge to post-acute care or home with appropriate resources  Description  INTERVENTIONS:  - Conduct assessment to determine patient/family and health care team treatment goals, and need for post-acute services based on payer coverage, community resources, and patient preferences, and barriers to discharge  - Address psychosocial, clinical, and financial barriers to discharge as identified in assessment in conjunction with the patient/family and health care team  - Arrange appropriate level of post-acute services according to patients   needs and preference and payer coverage in collaboration with the physician and health care team  - Communicate with and update the patient/family, physician, and health care team regarding progress on the discharge plan  - Arrange appropriate transportation to post-acute venues  Outcome: Progressing     Problem: PAIN - ADULT  Goal: Verbalizes/displays adequate comfort level or baseline comfort level  Description  Interventions:  - Encourage patient to monitor pain and request assistance  - Assess pain using appropriate pain scale  - Administer analgesics based on type and severity of pain and evaluate response  - Implement non-pharmacological measures as appropriate and evaluate response  - Consider cultural and social influences on pain and pain management  - Notify physician/advanced practitioner if interventions unsuccessful or patient reports new pain  Outcome: Progressing     Problem: INFECTION - ADULT  Goal: Absence or prevention of progression during hospitalization  Description  INTERVENTIONS:  - Assess and monitor for signs and symptoms of infection  - Monitor lab/diagnostic results  - Monitor all insertion sites, i e  indwelling lines, tubes, and drains  - Monitor endotracheal if appropriate and nasal secretions for changes in amount and color  - [place] appropriate cooling/warming therapies per order  - Administer medications as ordered  - Instruct and encourage patient and family to use good hand hygiene technique  - Identify and instruct in appropriate isolation precautions for identified infection/condition  Outcome: Progressing  Goal: Absence of fever/infection during neutropenic period  Description  INTERVENTIONS:  - Monitor WBC    Outcome: Progressing     Problem: SAFETY ADULT  Goal: Patient will remain free of falls  Description  INTERVENTIONS:  - Assess patient frequently for physical needs  -  Identify cognitive and physical deficits and behaviors that affect risk of falls    -  [place] fall precautions as indicated by assessment   - Educate patient/family on patient safety including physical limitations  - Instruct patient to call for assistance with activity based on assessment  - Modify environment to reduce risk of injury  - Consider OT/PT consult to assist with strengthening/mobility  Outcome: Progressing  Goal: Maintain or return to baseline ADL function  Description  INTERVENTIONS:  -  Assess patient's ability to carry out ADLs; assess patient's baseline for ADL function and identify physical deficits which impact ability to perform ADLs (bathing, care of mouth/teeth, toileting, grooming, dressing, etc )  - Assess/evaluate cause of self-care deficits   - Assess range of motion  - Assess patient's mobility; develop plan if impaired  - Assess patient's need for assistive devices and provide as appropriate  - Encourage maximum independence but intervene and supervise when necessary  - Involve family in performance of ADLs  - Assess for home care needs following discharge   - Consider OT consult to assist with ADL evaluation and planning for discharge  - Provide patient education as appropriate  Outcome: Progressing  Goal: Maintain or return mobility status to optimal level  Description  INTERVENTIONS:  - Assess patient's baseline mobility status (ambulation, transfers, stairs, etc )    - Identify cognitive and physical deficits and behaviors that affect mobility  - Identify mobility aids required to assist with transfers and/or ambulation (gait belt, sit-to-stand, lift, walker, cane, etc )  - [place] fall precautions as indicated by assessment  - Record patient progress and toleration of activity level on Mobility SBAR; progress patient to next Phase/Stage  - Instruct patient to call for assistance with activity based on assessment  - Consider rehabilitation consult to assist with strengthening/weightbearing, etc   Outcome: Progressing     Problem: DISCHARGE PLANNING  Goal: Discharge to home or other facility with appropriate resources  Description  INTERVENTIONS:  - Identify barriers to discharge w/patient and caregiver  - Arrange for needed discharge resources and transportation as appropriate  - Identify discharge learning needs (meds, wound care, etc )  - Arrange for interpretive services to assist at discharge as needed  - Refer to Case Management Department for coordinating discharge planning if the patient needs post-hospital services based on physician/advanced practitioner order or complex needs related to functional status, cognitive ability, or social support system  Outcome: Progressing     Problem: Knowledge Deficit  Goal: Patient/family/caregiver demonstrates understanding of disease process, treatment plan, medications, and discharge instructions  Description  Complete learning assessment and assess knowledge base    Interventions:  - Provide teaching at level of understanding  - Provide teaching via preferred learning methods  Outcome: Progressing     Problem: GASTROINTESTINAL - ADULT  Goal: Minimal or absence of nausea and/or vomiting  Description  INTERVENTIONS:  - Administer IV fluids if ordered to ensure adequate hydration  - Maintain NPO status until nausea and vomiting are resolved  - Nasogastric tube if ordered  - Administer ordered antiemetic medications as needed  - Provide nonpharmacologic comfort measures as appropriate  - Advance diet as tolerated, if ordered  - Consider nutrition services referral to assist patient with adequate nutrition and appropriate food choices  Outcome: Progressing  Goal: Maintains or returns to baseline bowel function  Description  INTERVENTIONS:  - Assess bowel function  - Encourage oral fluids to ensure adequate hydration  - Administer IV fluids if ordered to ensure adequate hydration  - Administer ordered medications as needed  - Encourage mobilization and activity  - Consider nutritional services referral to assist patient with adequate nutrition and appropriate food choices  Outcome: Progressing  Goal: Maintains adequate nutritional intake  Description  INTERVENTIONS:  - Monitor percentage of each meal consumed  - Identify factors contributing to decreased intake, treat as appropriate  - Assist with meals as needed  - Monitor I&O, weight, and lab values if indicated  - Obtain nutrition services referral as needed  Outcome: Progressing     Problem: Potential for Falls  Goal: Patient will remain free of falls  Description  INTERVENTIONS:  - Assess patient frequently for physical needs  -  Identify cognitive and physical deficits and behaviors that affect risk of falls    -  [place] fall precautions as indicated by assessment   - Educate patient/family on patient safety including physical limitations  - Instruct patient to call for assistance with activity based on assessment  - Modify environment to reduce risk of injury  - Consider OT/PT consult to assist with strengthening/mobility  Outcome: Progressing

## 2020-07-09 NOTE — UTILIZATION REVIEW
Notification of Discharge  This is a Notification of Discharge from our facility [address]  Please be advised that this patient has been discharge from our facility  Below you will find the admission and discharge date and time including the patients disposition  PRESENTATION DATE: 7/6/2020 10:46 AM  OBS ADMISSION DATE:   IP ADMISSION DATE: 7/6/20 1404   DISCHARGE DATE: 7/9/2020 10:41 AM  DISPOSITION: Home/Self Care Home/Self Care   Admission Orders listed below:  Admission Orders (From admission, onward)     Ordered        07/06/20 1409  Inpatient Admission  Once                   Please contact the UR Department if additional information is required to close this patient's authorization/case  [address] Utilization Review Department  Main: [phone] x carefully listen to the prompts  All voicemails are confidential   [email or web address]  Send all requests for admission clinical reviews, approved or denied determinations and any other requests to dedicated fax number below belonging to the campus where the patient is receiving treatment   List of dedicated fax numbers:  [address] DENIALS (Administrative/Medical Necessity) [phone]   [address] (Maternity/NICU/Pediatrics) [phone] [address] [facility] [phone]   [facility] [phone]   [facility] [phone]   [facility] [address] [facility] [phone]   [facility]  [phone]   [address] [facility]  [address] [place] [address] [address] [address] [facility] [phone]

## 2020-07-09 NOTE — PLAN OF CARE
Problem: Nutrition/Hydration-ADULT  Goal: Nutrient/Hydration intake appropriate for improving, restoring or maintaining nutritional needs  Description  Monitor and assess patient's nutrition/hydration status for malnutrition  Collaborate with interdisciplinary team and initiate plan and interventions as ordered  Monitor patient's weight and dietary intake as ordered or per policy  Utilize nutrition screening tool and intervene as necessary  Determine patient's food preferences and provide high-protein, high-caloric foods as appropriate       INTERVENTIONS:  - Monitor oral intake, urinary output, labs, and treatment plans  - Assess nutrition and hydration status and recommend course of action  - Evaluate amount of meals eaten  - Assist patient with eating if necessary   - Allow adequate time for meals  - Recommend/ encourage appropriate diets, oral nutritional supplements, and vitamin/mineral supplements  - Order, calculate, and assess calorie counts as needed  - Recommend, monitor, and adjust tube feedings and TPN/PPN based on assessed needs  - Assess need for intravenous fluids  - Provide specific nutrition/hydration education as appropriate  - Include patient/family/caregiver in decisions related to nutrition  7/9/2020 1041 by Rosanna Negron RN  Outcome: Adequate for Discharge  7/9/2020 1040 by Rosanna Negron, RN  Outcome: Progressing     Problem: DISCHARGE PLANNING - CARE MANAGEMENT  Goal: Discharge to post-acute care or home with appropriate resources  Description  INTERVENTIONS:  - Conduct assessment to determine patient/family and health care team treatment goals, and need for post-acute services based on payer coverage, community resources, and patient preferences, and barriers to discharge  - Address psychosocial, clinical, and financial barriers to discharge as identified in assessment in conjunction with the patient/family and health care team  - Arrange appropriate level of post-acute services according to patients   needs and preference and payer coverage in collaboration with the physician and health care team  - Communicate with and update the patient/family, physician, and health care team regarding progress on the discharge plan  - Arrange appropriate transportation to post-acute venues  7/9/2020 1041 by Hazel Malloy RN  Outcome: Adequate for Discharge  7/9/2020 1040 by Hazel Malloy RN  Outcome: Progressing     Problem: PAIN - ADULT  Goal: Verbalizes/displays adequate comfort level or baseline comfort level  Description  Interventions:  - Encourage patient to monitor pain and request assistance  - Assess pain using appropriate pain scale  - Administer analgesics based on type and severity of pain and evaluate response  - Implement non-pharmacological measures as appropriate and evaluate response  - Consider cultural and social influences on pain and pain management  - Notify physician/advanced practitioner if interventions unsuccessful or patient reports new pain  7/9/2020 1041 by Hazel Malloy RN  Outcome: Adequate for Discharge  7/9/2020 1040 by Hazel Malloy RN  Outcome: Progressing     Problem: INFECTION - ADULT  Goal: Absence or prevention of progression during hospitalization  Description  INTERVENTIONS:  - Assess and monitor for signs and symptoms of infection  - Monitor lab/diagnostic results  - Monitor all insertion sites, i e  indwelling lines, tubes, and drains  - Monitor endotracheal if appropriate and nasal secretions for changes in amount and color  - [place] appropriate cooling/warming therapies per order  - Administer medications as ordered  - Instruct and encourage patient and family to use good hand hygiene technique  - Identify and instruct in appropriate isolation precautions for identified infection/condition  7/9/2020 1041 by Hazel Malloy RN  Outcome: Adequate for Discharge  7/9/2020 1040 by Hazel Malloy RN  Outcome: Progressing  Goal: Absence of fever/infection during neutropenic period  Description  INTERVENTIONS:  - Monitor WBC    7/9/2020 1041 by Brittany Jarvis RN  Outcome: Adequate for Discharge  7/9/2020 1040 by Brittany Jarvis RN  Outcome: Progressing     Problem: SAFETY ADULT  Goal: Patient will remain free of falls  Description  INTERVENTIONS:  - Assess patient frequently for physical needs  -  Identify cognitive and physical deficits and behaviors that affect risk of falls    -  [place] fall precautions as indicated by assessment   - Educate patient/family on patient safety including physical limitations  - Instruct patient to call for assistance with activity based on assessment  - Modify environment to reduce risk of injury  - Consider OT/PT consult to assist with strengthening/mobility  7/9/2020 1041 by Brittany Jarvis RN  Outcome: Adequate for Discharge  7/9/2020 1040 by Brittany Jarvis RN  Outcome: Progressing  Goal: Maintain or return to baseline ADL function  Description  INTERVENTIONS:  -  Assess patient's ability to carry out ADLs; assess patient's baseline for ADL function and identify physical deficits which impact ability to perform ADLs (bathing, care of mouth/teeth, toileting, grooming, dressing, etc )  - Assess/evaluate cause of self-care deficits   - Assess range of motion  - Assess patient's mobility; develop plan if impaired  - Assess patient's need for assistive devices and provide as appropriate  - Encourage maximum independence but intervene and supervise when necessary  - Involve family in performance of ADLs  - Assess for home care needs following discharge   - Consider OT consult to assist with ADL evaluation and planning for discharge  - Provide patient education as appropriate  7/9/2020 1041 by Brittany Jarvis RN  Outcome: Adequate for Discharge  7/9/2020 1040 by Brittany Jarvis RN  Outcome: Progressing  Goal: Maintain or return mobility status to optimal level  Description  INTERVENTIONS:  - Assess patient's baseline mobility status (ambulation, transfers, stairs, etc )    - Identify cognitive and physical deficits and behaviors that affect mobility  - Identify mobility aids required to assist with transfers and/or ambulation (gait belt, sit-to-stand, lift, walker, cane, etc )  - [place] fall precautions as indicated by assessment  - Record patient progress and toleration of activity level on Mobility SBAR; progress patient to next Phase/Stage  - Instruct patient to call for assistance with activity based on assessment  - Consider rehabilitation consult to assist with strengthening/weightbearing, etc   7/9/2020 1041 by Andrews Holm RN  Outcome: Adequate for Discharge  7/9/2020 1040 by Andrews Holm RN  Outcome: Progressing     Problem: DISCHARGE PLANNING  Goal: Discharge to home or other facility with appropriate resources  Description  INTERVENTIONS:  - Identify barriers to discharge w/patient and caregiver  - Arrange for needed discharge resources and transportation as appropriate  - Identify discharge learning needs (meds, wound care, etc )  - Arrange for interpretive services to assist at discharge as needed  - Refer to Case Management Department for coordinating discharge planning if the patient needs post-hospital services based on physician/advanced practitioner order or complex needs related to functional status, cognitive ability, or social support system  7/9/2020 1041 by Andrews Holm RN  Outcome: Adequate for Discharge  7/9/2020 1040 by Andrews Holm RN  Outcome: Progressing     Problem: Knowledge Deficit  Goal: Patient/family/caregiver demonstrates understanding of disease process, treatment plan, medications, and discharge instructions  Description  Complete learning assessment and assess knowledge base    Interventions:  - Provide teaching at level of understanding  - Provide teaching via preferred learning methods  7/9/2020 1041 by Elenor Libman, RN  Outcome: Adequate for Discharge  7/9/2020 1040 by Elenor Libman, RN  Outcome: Progressing     Problem: GASTROINTESTINAL - ADULT  Goal: Minimal or absence of nausea and/or vomiting  Description  INTERVENTIONS:  - Administer IV fluids if ordered to ensure adequate hydration  - Maintain NPO status until nausea and vomiting are resolved  - Nasogastric tube if ordered  - Administer ordered antiemetic medications as needed  - Provide nonpharmacologic comfort measures as appropriate  - Advance diet as tolerated, if ordered  - Consider nutrition services referral to assist patient with adequate nutrition and appropriate food choices  7/9/2020 1041 by Elenor Libman, RN  Outcome: Adequate for Discharge  7/9/2020 1040 by Elenor Libman, RN  Outcome: Progressing  Goal: Maintains or returns to baseline bowel function  Description  INTERVENTIONS:  - Assess bowel function  - Encourage oral fluids to ensure adequate hydration  - Administer IV fluids if ordered to ensure adequate hydration  - Administer ordered medications as needed  - Encourage mobilization and activity  - Consider nutritional services referral to assist patient with adequate nutrition and appropriate food choices  7/9/2020 1041 by Elenor Libman, RN  Outcome: Adequate for Discharge  7/9/2020 1040 by Elenor Libman, RN  Outcome: Progressing  Goal: Maintains adequate nutritional intake  Description  INTERVENTIONS:  - Monitor percentage of each meal consumed  - Identify factors contributing to decreased intake, treat as appropriate  - Assist with meals as needed  - Monitor I&O, weight, and lab values if indicated  - Obtain nutrition services referral as needed  7/9/2020 1041 by Elenor Libman, RN  Outcome: Adequate for Discharge  7/9/2020 1040 by Elenor Libman, RN  Outcome: Progressing     Problem: Potential for Falls  Goal: Patient will remain free of falls  Description  INTERVENTIONS:  - Assess patient frequently for physical needs  -  Identify cognitive and physical deficits and behaviors that affect risk of falls    -  [place] fall precautions as indicated by assessment   - Educate patient/family on patient safety including physical limitations  - Instruct patient to call for assistance with activity based on assessment  - Modify environment to reduce risk of injury  - Consider OT/PT consult to assist with strengthening/mobility  7/9/2020 1041 by Rai Velazco RN  Outcome: Adequate for Discharge  7/9/2020 1040 by Rai Velazco RN  Outcome: Progressing

## 2020-07-09 NOTE — DISCHARGE INSTRUCTIONS
RECOMMEND A FULL LIQUID DIET FOR 1 WEEK  SOUP, CEREAL, SCRAMBLED EGGS, ENSURE, MESH POTATOES, ANYTHING IN A   ANY CAN ADVANCE TO A SOFT DIET-FISH, CHICKEN, PASTA  PLEASE AVOID SALADS AND RAW VEGETABLES FOR 1 MONTH  PLEASE ADD BENEFIBER TO YOUR DIET DAILY  THIS WILL HELP TO ENSURE THAT HER BOWEL MOVEMENTS ARE SOFT  Please call the office when you leave to schedule an appointment for 2 weeks  This can be a virtual / telephone consultation or it can be in person  The choice is yours  Please call [phone]   [facility] [address], [address], [place], [address]  Off of [address] between [facility] and [facility]  Return to Work:   Okay to return to work when you feel well if you desire  If you have increased pain, fever >101 5, increased drainage, redness or a bad smell at your surgery site, please call us immediately or come directly to the Emergency Room        Diverticulitis   WHAT YOU NEED TO KNOW:   Diverticulitis is a condition that causes small pockets along your intestine called diverticula to become inflamed or infected  This is caused by hard bowel movements, food, or bacteria that get stuck in the pockets  DISCHARGE INSTRUCTIONS:   Seek care immediately if:   · You have bowel movement or foul-smelling discharge leaking from your vagina or in your urine  · You have severe diarrhea  · You urinate less than usual or not at all  · You are not able to have a bowel movement  · You cannot stop vomiting  · You have severe abdominal pain, a fever, and your abdomen is larger than usual      · You have new or increased blood in your bowel movements  Contact your healthcare provider if:   · You have pain when you urinate  · Your symptoms get worse or do not go away  · You have questions or concerns about your condition or care  Medicines:   · Antibiotics  may be given to help prevent or treat a bacterial infection      · Prescription pain medicine  may be given  Ask your healthcare provider how to take this medicine safely  Some prescription pain medicines contain acetaminophen  Do not take other medicines that contain acetaminophen without talking to your healthcare provider  Too much acetaminophen may cause liver damage  Prescription pain medicine may cause constipation  Ask your healthcare provider how to prevent or treat constipation  · Take your medicine as directed  Contact your healthcare provider if you think your medicine is not helping or if you have side effects  Tell him or her if you are allergic to any medicine  Keep a list of the medicines, vitamins, and herbs you take  Include the amounts, and when and why you take them  Bring the list or the pill bottles to follow-up visits  Carry your medicine list with you in case of an emergency  ·    Follow up with your healthcare provider as directed: You may need to return for a colonoscopy  When your symptoms are gone IN ONE MONTH , you may need a low-fat, high-fiber diet to help prevent diverticulitis from developing again  Your healthcare provider or dietitian can help you create meal plans  Write down your questions so you remember to ask them during your visits

## 2020-07-11 LAB
BACTERIA BLD CULT: NORMAL
BACTERIA BLD CULT: NORMAL

## 2020-09-01 ENCOUNTER — ANESTHESIA EVENT (OUTPATIENT)
Dept: GASTROENTEROLOGY | Facility: AMBULARY SURGERY CENTER | Age: 50
End: 2020-09-01

## 2020-09-14 PROBLEM — I10 ESSENTIAL HYPERTENSION: Status: ACTIVE | Noted: 2020-09-14

## 2020-09-14 NOTE — ANESTHESIA PREPROCEDURE EVALUATION
Procedure:  COLONOSCOPY    Relevant Problems   CARDIO   (+) Essential hypertension        Physical Exam    Airway    Mallampati score: II  TM Distance: <3 FB  Neck ROM: full     Dental   No notable dental hx     Cardiovascular  Cardiovascular exam normal    Pulmonary  Pulmonary exam normal     Other Findings        Anesthesia Plan  ASA Score- 2     Anesthesia Type- IV sedation with anesthesia with ASA Monitors  Additional Monitors:   Airway Plan:           Plan Factors-Exercise tolerance (METS): >4 METS  Chart reviewed  Existing labs reviewed  Patient is a current smoker  Patient instructed to abstain from smoking on day of procedure  Patient smoked on day of surgery  Induction- intravenous  Postoperative Plan-     Informed Consent- Anesthetic plan and risks discussed with patient  I personally reviewed this patient with the CRNA  Discussed and agreed on the Anesthesia Plan with the CRNA  Ethan Parekh

## 2020-09-15 ENCOUNTER — ANESTHESIA (OUTPATIENT)
Dept: GASTROENTEROLOGY | Facility: AMBULARY SURGERY CENTER | Age: 50
End: 2020-09-15

## 2020-09-15 ENCOUNTER — HOSPITAL ENCOUNTER (OUTPATIENT)
Dept: GASTROENTEROLOGY | Facility: AMBULARY SURGERY CENTER | Age: 50
Setting detail: OUTPATIENT SURGERY
Discharge: HOME | End: 2020-09-15
Attending: SURGERY | Admitting: SURGERY
Payer: COMMERCIAL

## 2020-09-15 VITALS
RESPIRATION RATE: 18 BRPM | DIASTOLIC BLOOD PRESSURE: 87 MMHG | HEART RATE: 78 BPM | BODY MASS INDEX: 42.28 KG/M2 | HEIGHT: 57 IN | SYSTOLIC BLOOD PRESSURE: 158 MMHG | WEIGHT: 196 LBS | OXYGEN SATURATION: 97 % | TEMPERATURE: 97.6 F

## 2020-09-15 VITALS — HEART RATE: 86 BPM

## 2020-09-15 DIAGNOSIS — K57.20 PERFORATION OF SIGMOID COLON DUE TO DIVERTICULITIS: ICD-10-CM

## 2020-09-15 PROCEDURE — 88305 TISSUE EXAM BY PATHOLOGIST: CPT | Performed by: PATHOLOGY

## 2020-09-15 PROCEDURE — 45385 COLONOSCOPY W/LESION REMOVAL: CPT | Performed by: SURGERY

## 2020-09-15 RX ORDER — SODIUM CHLORIDE, SODIUM LACTATE, POTASSIUM CHLORIDE, CALCIUM CHLORIDE 600; 310; 30; 20 MG/100ML; MG/100ML; MG/100ML; MG/100ML
125 INJECTION, SOLUTION INTRAVENOUS CONTINUOUS
Status: DISCONTINUED | OUTPATIENT
Start: 2020-09-15 | End: 2020-09-19 | Stop reason: HOSPADM

## 2020-09-15 RX ORDER — HYDROCHLOROTHIAZIDE 25 MG/1
25 TABLET ORAL DAILY
COMMUNITY

## 2020-09-15 RX ORDER — PROPOFOL 10 MG/ML
INJECTION, EMULSION INTRAVENOUS CONTINUOUS PRN
Status: DISCONTINUED | OUTPATIENT
Start: 2020-09-15 | End: 2020-09-15

## 2020-09-15 RX ORDER — PROPOFOL 10 MG/ML
INJECTION, EMULSION INTRAVENOUS AS NEEDED
Status: DISCONTINUED | OUTPATIENT
Start: 2020-09-15 | End: 2020-09-15

## 2020-09-15 RX ADMIN — PROPOFOL 50 MG: 10 INJECTION, EMULSION INTRAVENOUS at 11:21

## 2020-09-15 RX ADMIN — PROPOFOL 80 MG: 10 INJECTION, EMULSION INTRAVENOUS at 11:11

## 2020-09-15 RX ADMIN — SODIUM CHLORIDE, SODIUM LACTATE, POTASSIUM CHLORIDE, AND CALCIUM CHLORIDE: .6; .31; .03; .02 INJECTION, SOLUTION INTRAVENOUS at 11:07

## 2020-09-15 RX ADMIN — LIDOCAINE HYDROCHLORIDE 50 MG: 20 INJECTION, SOLUTION INTRAVENOUS at 11:11

## 2020-09-15 RX ADMIN — PROPOFOL 130 MCG/KG/MIN: 10 INJECTION, EMULSION INTRAVENOUS at 11:11

## 2020-09-15 NOTE — PERIOPERATIVE NURSING NOTE
Pt  Ambulating on unit, pt  Feeling spasm in abd less freq  Pt  Rubbing abd provided comfort also  Tolerated food and drinks without n/v  Continue to monitor

## 2020-09-15 NOTE — H&P
History and Physical -General Surgical Care   Caden Del Toro 48 y o  female MRN: [id number]  Unit/Bed#:  Encounter: [id number]       Principal Problem:  History of diverticulitis    HPI: Caden Del Toro is a 48y o  year old female who presents with history of diverticulitis  She was admitted to the hospital in early July  She has been doing well since being treated conservatively  She now presents for her screening colonoscopy  Review of Systems    Historical Information   Past Medical History:   Diagnosis Date    Diverticulitis     Diverticulosis     Fibroids     Hypertension      Past Surgical History:   Procedure Laterality Date    TUBAL LIGATION  1991     Social History   Social History     Substance and Sexual Activity   Alcohol Use Yes    Alcohol/week: 3 0 standard drinks    Types: 3 Shots of liquor per week    Comment: socially     Social History     Substance and Sexual Activity   Drug Use No     Social History     Tobacco Use   Smoking Status Current Every Day Smoker    Types: Cigars   Smokeless Tobacco Current User     History reviewed  No pertinent family history  Meds/Allergies     (Not in a hospital admission)    Current Facility-Administered Medications   Medication Dose Route Frequency    lactated ringers infusion  125 mL/hr Intravenous Continuous       No Known Allergies        Blood pressure 125/88, pulse 72, temperature (!) 96 9 °F (36 1 °C), temperature source Temporal, resp  rate 16, height 4' 9" (1 448 m), weight 88 9 kg (196 lb), SpO2 98 %  No intake or output data in the 24 hours ending 09/15/20 1105    PHYSICAL EXAM  General appearance: alert and oriented, in no acute distress    Obese  Lungs: clear to auscultation bilaterally  Heart: regular rate and rhythm, S1, S2 normal, no murmur, click, rub or gallop  Abdomen: soft, non-tender; bowel sounds normal; no masses,  no organomegaly  Rectal: deferred  Skin: Skin color, texture, turgor normal  No rashes or lesions    Lab Results:   No visits with results within 1 Day(s) from this visit  Latest known visit with results is:   Admission on 07/06/2020, Discharged on 07/09/2020   Component Date Value    Extra Tube 07/06/2020 Hold for add-ons   Extra Tube 07/06/2020 Hold for add-ons   Extra Tube 07/06/2020 Hold for add-ons       WBC 07/06/2020 12 11*    RBC 07/06/2020 5 28*    Hemoglobin 07/06/2020 14 0     Hematocrit 07/06/2020 44 6     MCV 07/06/2020 85     MCH 07/06/2020 26 5*    MCHC 07/06/2020 31 4     RDW 07/06/2020 13 6     MPV 07/06/2020 10 2     Platelets 48/93/6728 481*    nRBC 07/06/2020 0     Neutrophils Relative 07/06/2020 76*    Immat GRANS % 07/06/2020 0     Lymphocytes Relative 07/06/2020 15     Monocytes Relative 07/06/2020 9     Eosinophils Relative 07/06/2020 0     Basophils Relative 07/06/2020 0     Neutrophils Absolute 07/06/2020 9 10*    Immature Grans Absolute 07/06/2020 0 05     Lymphocytes Absolute 07/06/2020 1 85     Monocytes Absolute 07/06/2020 1 04     Eosinophils Absolute 07/06/2020 0 02     Basophils Absolute 07/06/2020 0 05     Protime 07/06/2020 13 8     INR 07/06/2020 1 12     PTT 07/06/2020 31     Sodium 07/06/2020 142     Potassium 07/06/2020 3 3*    Chloride 07/06/2020 101     CO2 07/06/2020 30     ANION GAP 07/06/2020 11     BUN 07/06/2020 16     Creatinine 07/06/2020 0 94     Glucose 07/06/2020 110     Calcium 07/06/2020 9 3     AST 07/06/2020 17     ALT 07/06/2020 34     Alkaline Phosphatase 07/06/2020 127*    Total Protein 07/06/2020 8 5*    Albumin 07/06/2020 3 0*    Total Bilirubin 07/06/2020 0 42     eGFR 07/06/2020 71     Lipase 07/06/2020 60*    Ventricular Rate 07/06/2020 79     Atrial Rate 07/06/2020 82     HI Interval 07/06/2020 150     QRSD Interval 07/06/2020 76     QT Interval 07/06/2020 352     QTC Interval 07/06/2020 404     P Axis 07/06/2020 44     QRS Axis 07/06/2020 9     T Wave Axis 07/06/2020 3     Platelets 67/55/5463 415*    MPV 07/06/2020 9 8     Blood Culture 07/06/2020 No Growth After 5 Days   Blood Culture 07/06/2020 No Growth After 5 Days       WBC 07/07/2020 6 17     RBC 07/07/2020 4 64     Hemoglobin 07/07/2020 12 5     Hematocrit 07/07/2020 39 7     MCV 07/07/2020 86     MCH 07/07/2020 26 9     MCHC 07/07/2020 31 5     RDW 07/07/2020 13 6     MPV 07/07/2020 9 9     Platelets 75/93/7926 342     nRBC 07/07/2020 0     Neutrophils Relative 07/07/2020 66     Immat GRANS % 07/07/2020 1     Lymphocytes Relative 07/07/2020 22     Monocytes Relative 07/07/2020 8     Eosinophils Relative 07/07/2020 2     Basophils Relative 07/07/2020 1     Neutrophils Absolute 07/07/2020 4 11     Immature Grans Absolute 07/07/2020 0 03     Lymphocytes Absolute 07/07/2020 1 36     Monocytes Absolute 07/07/2020 0 50     Eosinophils Absolute 07/07/2020 0 11     Basophils Absolute 07/07/2020 0 06     Sodium 07/07/2020 140     Potassium 07/07/2020 3 2*    Chloride 07/07/2020 104     CO2 07/07/2020 29     ANION GAP 07/07/2020 7     BUN 07/07/2020 12     Creatinine 07/07/2020 0 68     Glucose 07/07/2020 105     Calcium 07/07/2020 8 1*    eGFR 07/07/2020 102     Sodium 07/08/2020 139     Potassium 07/08/2020 3 3*    Chloride 07/08/2020 102     CO2 07/08/2020 30     ANION GAP 07/08/2020 7     BUN 07/08/2020 6     Creatinine 07/08/2020 0 77     Glucose 07/08/2020 101     Calcium 07/08/2020 8 2*    eGFR 07/08/2020 90     WBC 07/08/2020 6 52     RBC 07/08/2020 5 09     Hemoglobin 07/08/2020 13 6     Hematocrit 07/08/2020 44 0     MCV 07/08/2020 86     MCH 07/08/2020 26 7*    MCHC 07/08/2020 30 9*    RDW 07/08/2020 13 3     MPV 07/08/2020 9 7     Platelets 30/41/5917 347     nRBC 07/08/2020 0     Neutrophils Relative 07/08/2020 62     Immat GRANS % 07/08/2020 1     Lymphocytes Relative 07/08/2020 26     Monocytes Relative 07/08/2020 8     Eosinophils Relative 07/08/2020 2     Basophils Relative 07/08/2020 1     Neutrophils Absolute 07/08/2020 4 10     Immature Grans Absolute 07/08/2020 0 04     Lymphocytes Absolute 07/08/2020 1 67     Monocytes Absolute 07/08/2020 0 51     Eosinophils Absolute 07/08/2020 0 15     Basophils Absolute 07/08/2020 0 05      Imaging Studies:     ASSESSMENT:  History diverticulitis    PLAN:  Risks and benefits for colonoscopy including perforation or bleeding were discussed with her and she agrees to proceed  Counseling / Coordination of Care  Total time spent today  20 minutes  Greater than 50% of total time was spent with the patient and / or family counseling and / or coordination of care

## 2020-09-15 NOTE — PROGRESS NOTES
Patient states of abd discomfort being less than before  Meeting discharge criteria and dischrged with

## 2020-09-15 NOTE — DISCHARGE INSTRUCTIONS
Sigmoid diverticula    Two sigmoid polyps noted  Biopsies were performed    Will call with results    Recommend high-fiber diet    Recommend tobacco cessation    Repeat colonoscopy in 3 years pending biopsy results

## 2020-09-15 NOTE — PROGRESS NOTES
Pt  Offered complaints of 10/10 upper (right and left) abdominal pain shortly after waking from procedure today  Pt  With guarding of abdomen also  Pt  States she did not feel she needed to pass gas at the time of pain  Dr Alva notified  Was advised to get put up and OOB and walking  Pt  Walked to bathroom and passed some gas  Pain felt better while walking back from bathroom, but re-appeared before she sat down  Dr Alva updated  Was advised to have pt  Walk more and re-assess discomfort

## 2020-09-15 NOTE — ANESTHESIA POSTPROCEDURE EVALUATION
Post-Op Assessment Note    CV Status:  Stable  Pain Score: 0    Pain management: adequate     Mental Status:  Alert and awake   Hydration Status:  Euvolemic and stable   PONV Controlled:  Controlled   Airway Patency:  Patent and adequate      Post Op Vitals Reviewed: Yes      Staff: CRNA         No complications documented      BP      Temp     Pulse     Resp      SpO2

## 2022-12-29 ENCOUNTER — APPOINTMENT (EMERGENCY)
Dept: RADIOLOGY | Facility: HOSPITAL | Age: 52
End: 2022-12-29

## 2022-12-29 ENCOUNTER — HOSPITAL ENCOUNTER (OUTPATIENT)
Facility: HOSPITAL | Age: 52
Setting detail: OBSERVATION
Discharge: HOME | End: 2022-12-30
Attending: EMERGENCY MEDICINE | Admitting: SURGERY

## 2022-12-29 ENCOUNTER — APPOINTMENT (EMERGENCY)
Dept: CT IMAGING | Facility: HOSPITAL | Age: 52
End: 2022-12-29

## 2022-12-29 DIAGNOSIS — E87.6 HYPOKALEMIA: ICD-10-CM

## 2022-12-29 DIAGNOSIS — R31.9 HEMATURIA: ICD-10-CM

## 2022-12-29 DIAGNOSIS — K57.92 ACUTE DIVERTICULITIS: Primary | ICD-10-CM

## 2022-12-29 PROBLEM — R10.10 PAIN OF UPPER ABDOMEN: Status: ACTIVE | Noted: 2022-12-29

## 2022-12-29 PROBLEM — K57.32 SIGMOID DIVERTICULITIS: Status: ACTIVE | Noted: 2018-05-14

## 2022-12-29 LAB
ALBUMIN SERPL BCP-MCNC: 3.6 G/DL (ref 3.5–5)
ALP SERPL-CCNC: 78 U/L (ref 34–104)
ALT SERPL W P-5'-P-CCNC: 19 U/L (ref 7–52)
ANION GAP SERPL CALCULATED.3IONS-SCNC: 7 MMOL/L (ref 4–13)
AST SERPL W P-5'-P-CCNC: 14 U/L (ref 13–39)
ATRIAL RATE: 72 BPM
BACTERIA UR QL AUTO: ABNORMAL /HPF
BASOPHILS # BLD AUTO: 0.06 THOUSANDS/ÂΜL (ref 0–0.1)
BASOPHILS NFR BLD AUTO: 1 % (ref 0–1)
BILIRUB SERPL-MCNC: 0.6 MG/DL (ref 0.2–1)
BILIRUB UR QL STRIP: NEGATIVE
BUN SERPL-MCNC: 10 MG/DL (ref 5–25)
CALCIUM SERPL-MCNC: 9 MG/DL (ref 8.4–10.2)
CHLORIDE SERPL-SCNC: 102 MMOL/L (ref 96–108)
CLARITY UR: ABNORMAL
CO2 SERPL-SCNC: 28 MMOL/L (ref 21–32)
COLOR UR: YELLOW
CREAT SERPL-MCNC: 0.7 MG/DL (ref 0.6–1.3)
EOSINOPHIL # BLD AUTO: 0.14 THOUSAND/ÂΜL (ref 0–0.61)
EOSINOPHIL NFR BLD AUTO: 2 % (ref 0–6)
ERYTHROCYTE [DISTWIDTH] IN BLOOD BY AUTOMATED COUNT: 12.7 % (ref 11.6–15.1)
EXT PREGNANCY TEST URINE: NEGATIVE
EXT. CONTROL: NORMAL
GFR SERPL CREATININE-BSD FRML MDRD: 99 ML/MIN/1.73SQ M
GLUCOSE SERPL-MCNC: 104 MG/DL (ref 65–140)
GLUCOSE UR STRIP-MCNC: NEGATIVE MG/DL
HCT VFR BLD AUTO: 45.4 % (ref 34.8–46.1)
HGB BLD-MCNC: 14.8 G/DL (ref 11.5–15.4)
HGB UR QL STRIP.AUTO: NEGATIVE
IMM GRANULOCYTES # BLD AUTO: 0.04 THOUSAND/UL (ref 0–0.2)
IMM GRANULOCYTES NFR BLD AUTO: 1 % (ref 0–2)
KETONES UR STRIP-MCNC: NEGATIVE MG/DL
LEUKOCYTE ESTERASE UR QL STRIP: NEGATIVE
LIPASE SERPL-CCNC: <6 U/L (ref 11–82)
LYMPHOCYTES # BLD AUTO: 1.86 THOUSANDS/ÂΜL (ref 0.6–4.47)
LYMPHOCYTES NFR BLD AUTO: 23 % (ref 14–44)
MCH RBC QN AUTO: 28 PG (ref 26.8–34.3)
MCHC RBC AUTO-ENTMCNC: 32.6 G/DL (ref 31.4–37.4)
MCV RBC AUTO: 86 FL (ref 82–98)
MONOCYTES # BLD AUTO: 0.69 THOUSAND/ÂΜL (ref 0.17–1.22)
MONOCYTES NFR BLD AUTO: 9 % (ref 4–12)
MUCOUS THREADS UR QL AUTO: ABNORMAL
NEUTROPHILS # BLD AUTO: 5.21 THOUSANDS/ÂΜL (ref 1.85–7.62)
NEUTS SEG NFR BLD AUTO: 64 % (ref 43–75)
NITRITE UR QL STRIP: NEGATIVE
NON-SQ EPI CELLS URNS QL MICRO: ABNORMAL /HPF
NRBC BLD AUTO-RTO: 0 /100 WBCS
P AXIS: 36 DEGREES
PH UR STRIP.AUTO: 7.5 [PH]
PLATELET # BLD AUTO: 257 THOUSANDS/UL (ref 149–390)
PMV BLD AUTO: 10.7 FL (ref 8.9–12.7)
POTASSIUM SERPL-SCNC: 3.3 MMOL/L (ref 3.5–5.3)
PR INTERVAL: 150 MS
PROT SERPL-MCNC: 6.6 G/DL (ref 6.4–8.4)
PROT UR STRIP-MCNC: ABNORMAL MG/DL
QRS AXIS: -18 DEGREES
QRSD INTERVAL: 80 MS
QT INTERVAL: 400 MS
QTC INTERVAL: 438 MS
RBC # BLD AUTO: 5.29 MILLION/UL (ref 3.81–5.12)
RBC #/AREA URNS AUTO: ABNORMAL /HPF
SODIUM SERPL-SCNC: 137 MMOL/L (ref 135–147)
SP GR UR STRIP.AUTO: 1.02 (ref 1–1.03)
T WAVE AXIS: 12 DEGREES
UROBILINOGEN UR STRIP-ACNC: 2 MG/DL
VENTRICULAR RATE: 72 BPM
WBC # BLD AUTO: 8 THOUSAND/UL (ref 4.31–10.16)
WBC #/AREA URNS AUTO: ABNORMAL /HPF

## 2022-12-29 RX ORDER — OXYCODONE HYDROCHLORIDE 5 MG/1
5 TABLET ORAL EVERY 4 HOURS PRN
Status: DISCONTINUED | OUTPATIENT
Start: 2022-12-29 | End: 2022-12-30 | Stop reason: HOSPADM

## 2022-12-29 RX ORDER — OXYCODONE HYDROCHLORIDE 5 MG/1
2.5 TABLET ORAL EVERY 4 HOURS PRN
Status: DISCONTINUED | OUTPATIENT
Start: 2022-12-29 | End: 2022-12-30 | Stop reason: HOSPADM

## 2022-12-29 RX ORDER — ENOXAPARIN SODIUM 100 MG/ML
40 INJECTION SUBCUTANEOUS 2 TIMES DAILY
Status: DISCONTINUED | OUTPATIENT
Start: 2022-12-29 | End: 2022-12-30 | Stop reason: HOSPADM

## 2022-12-29 RX ORDER — POTASSIUM CHLORIDE 20 MEQ/1
20 TABLET, EXTENDED RELEASE ORAL ONCE
Status: COMPLETED | OUTPATIENT
Start: 2022-12-29 | End: 2022-12-29

## 2022-12-29 RX ORDER — POTASSIUM CHLORIDE 20 MEQ/1
40 TABLET, EXTENDED RELEASE ORAL ONCE
Status: COMPLETED | OUTPATIENT
Start: 2022-12-29 | End: 2022-12-29

## 2022-12-29 RX ORDER — SODIUM CHLORIDE, SODIUM LACTATE, POTASSIUM CHLORIDE, CALCIUM CHLORIDE 600; 310; 30; 20 MG/100ML; MG/100ML; MG/100ML; MG/100ML
100 INJECTION, SOLUTION INTRAVENOUS CONTINUOUS
Status: DISCONTINUED | OUTPATIENT
Start: 2022-12-29 | End: 2022-12-30

## 2022-12-29 RX ORDER — ONDANSETRON 2 MG/ML
4 INJECTION INTRAMUSCULAR; INTRAVENOUS EVERY 6 HOURS PRN
Status: DISCONTINUED | OUTPATIENT
Start: 2022-12-29 | End: 2022-12-30 | Stop reason: HOSPADM

## 2022-12-29 RX ORDER — ACETAMINOPHEN 325 MG/1
650 TABLET ORAL EVERY 6 HOURS PRN
Status: DISCONTINUED | OUTPATIENT
Start: 2022-12-29 | End: 2022-12-30 | Stop reason: HOSPADM

## 2022-12-29 RX ADMIN — IOHEXOL 100 ML: 350 INJECTION, SOLUTION INTRAVENOUS at 11:39

## 2022-12-29 RX ADMIN — PIPERACILLIN AND TAZOBACTAM 3.38 G: 36; 4.5 INJECTION, POWDER, FOR SOLUTION INTRAVENOUS at 21:32

## 2022-12-29 RX ADMIN — SODIUM CHLORIDE, POTASSIUM CHLORIDE, SODIUM LACTATE AND CALCIUM CHLORIDE 100 ML/HR: 600; 310; 30; 20 INJECTION, SOLUTION INTRAVENOUS at 15:52

## 2022-12-29 RX ADMIN — PSYLLIUM HUSK 1 PACKET: 3.4 POWDER ORAL at 15:52

## 2022-12-29 RX ADMIN — POTASSIUM CHLORIDE 40 MEQ: 1500 TABLET, EXTENDED RELEASE ORAL at 15:52

## 2022-12-29 RX ADMIN — PIPERACILLIN AND TAZOBACTAM 3.38 G: 36; 4.5 INJECTION, POWDER, FOR SOLUTION INTRAVENOUS at 16:17

## 2022-12-29 RX ADMIN — Medication 400 MG: at 12:06

## 2022-12-29 RX ADMIN — POTASSIUM CHLORIDE 20 MEQ: 1500 TABLET, EXTENDED RELEASE ORAL at 12:06

## 2022-12-29 NOTE — ED PROVIDER NOTES
History  Chief Complaint   Patient presents with   • Abdominal Pain     Upper abd pain for 1 week  Sharp constant pains, feels gas bubbles through her belly  Denies n/v/d  Occasional belching  45 yo f w/ hx of HTN,  diverticulitis and viscus perforation presents w/ abdominal pain  She states 1 week hx of epigastric bloating/cramping abdominal pain radiating to the RUQ and LUQ  Pain is exacerbated by meals and states food aversion 2/2 to pain  Has been eating smaller more frequent meals  Taking 5 alleve at night every other day due to pain  Last bowel movement was this morning and was very little for the patient, prior bowel movement was 2 days ago and also small  Previous hx of diverticulitis that was treated w/ course of abx  Denies relieving factors or trying anything other than Aleve  NKDA  Amlodipine  No hx of surgeries or abdominal surgeries  Occasional alcohol use, 4 drinks on the weekend  Cigars  Denies recreational drug use  Denies fevers, chills, nausea, vomiting, diarrhea, bloody stools, dysuria, polyuria, frequency chest pain, shortness of breath, exertional dyspnea, leg swelling  Prior to Admission Medications   Prescriptions Last Dose Informant Patient Reported? Taking? amLODIPine (NORVASC) 2 5 mg tablet 12/29/2022  No Yes   Sig: Take 1 tablet by mouth daily for 20 days   Patient taking differently: Take 5 mg by mouth daily   hydrochlorothiazide (HYDRODIURIL) 25 mg tablet 12/29/2022  Yes Yes   Sig: Take 25 mg by mouth daily      Facility-Administered Medications: None       Past Medical History:   Diagnosis Date   • Diverticulitis    • Diverticulosis    • Fibroids    • Hypertension        Past Surgical History:   Procedure Laterality Date   • TUBAL LIGATION  1991       History reviewed  No pertinent family history  I have reviewed and agree with the history as documented      E-Cigarette/Vaping   • E-Cigarette Use Never User      E-Cigarette/Vaping Substances     Social History Tobacco Use   • Smoking status: Every Day     Types: Cigars   • Smokeless tobacco: Current   Vaping Use   • Vaping Use: Never used   Substance Use Topics   • Alcohol use: Yes     Alcohol/week: 3 0 standard drinks     Types: 3 Shots of liquor per week     Comment: socially   • Drug use: No        Review of Systems   Constitutional: Negative for chills, diaphoresis and fever  Eyes: Negative for pain and visual disturbance  Respiratory: Negative for cough, chest tightness and shortness of breath  Cardiovascular: Negative for chest pain, palpitations and leg swelling  Gastrointestinal: Positive for abdominal distention, abdominal pain, constipation and nausea  Negative for blood in stool, diarrhea and vomiting  Endocrine: Negative for polyuria  Genitourinary: Negative for difficulty urinating, dysuria, frequency and urgency  Musculoskeletal: Negative for back pain  Skin: Negative for color change  Neurological: Negative for weakness, numbness and headaches  All other systems reviewed and are negative  Physical Exam  ED Triage Vitals   Temperature Pulse Respirations Blood Pressure SpO2   12/29/22 0935 12/29/22 0935 12/29/22 0935 12/29/22 0935 12/29/22 0935   97 7 °F (36 5 °C) 95 17 (!) 172/88 99 %      Temp Source Heart Rate Source Patient Position - Orthostatic VS BP Location FiO2 (%)   12/29/22 0935 12/29/22 0935 12/29/22 1450 12/29/22 0935 --   Oral Monitor Sitting Right arm       Pain Score       12/29/22 0935       No Pain             Orthostatic Vital Signs  Vitals:    12/29/22 0935 12/29/22 1230 12/29/22 1450   BP: (!) 172/88 140/75 139/83   Pulse: 95 78 67   Patient Position - Orthostatic VS:   Sitting       Physical Exam  Vitals and nursing note reviewed  Constitutional:       General: She is not in acute distress  Appearance: She is obese  She is not ill-appearing, toxic-appearing or diaphoretic        Comments: Distended abdomen     HENT:      Head: Normocephalic and atraumatic  Right Ear: External ear normal       Left Ear: External ear normal       Nose: Nose normal  No congestion  Mouth/Throat:      Mouth: Mucous membranes are moist    Eyes:      General: No scleral icterus  Right eye: No discharge  Left eye: No discharge  Extraocular Movements: Extraocular movements intact  Conjunctiva/sclera: Conjunctivae normal    Cardiovascular:      Rate and Rhythm: Normal rate and regular rhythm  Pulses: Normal pulses  Heart sounds: Normal heart sounds  Pulmonary:      Effort: Pulmonary effort is normal  No respiratory distress  Breath sounds: Normal breath sounds  No wheezing or rales  Abdominal:      General: Abdomen is protuberant  Bowel sounds are normal  There is no distension  There are no signs of injury  Palpations: Abdomen is soft  There is no hepatomegaly or mass  Tenderness: There is abdominal tenderness in the right upper quadrant, epigastric area and left upper quadrant  There is no right CVA tenderness, left CVA tenderness, guarding or rebound  Positive signs include Cruz's sign  Negative signs include Rovsing's sign and McBurney's sign  Musculoskeletal:         General: No deformity  Cervical back: Normal range of motion and neck supple  Right lower leg: No edema  Left lower leg: No edema  Skin:     General: Skin is warm  Capillary Refill: Capillary refill takes less than 2 seconds  Coloration: Skin is not cyanotic, jaundiced, mottled or pale  Findings: No erythema or rash  Neurological:      General: No focal deficit present  Mental Status: She is alert and oriented to person, place, and time  Mental status is at baseline  Motor: No weakness     Psychiatric:         Mood and Affect: Mood normal          Behavior: Behavior normal          ED Medications  Medications   ondansetron (ZOFRAN) injection 4 mg (has no administration in time range)   lactated ringers infusion (100 mL/hr Intravenous New Bag 12/29/22 1552)   enoxaparin (LOVENOX) subcutaneous injection 40 mg (has no administration in time range)   piperacillin-tazobactam (ZOSYN) 3 375 g in sodium chloride 0 9 % 100 mL IVPB (3 375 g Intravenous New Bag 12/29/22 1617)   acetaminophen (TYLENOL) tablet 650 mg (has no administration in time range)   psyllium (METAMUCIL) 1 packet (1 packet Oral Given 12/29/22 1552)   oxyCODONE (ROXICODONE) IR tablet 2 5 mg (has no administration in time range)   oxyCODONE (ROXICODONE) IR tablet 5 mg (has no administration in time range)   potassium chloride (K-DUR,KLOR-CON) CR tablet 20 mEq (20 mEq Oral Given 12/29/22 1206)   magnesium oxide (MAG-OX) tablet 400 mg (400 mg Oral Given 12/29/22 1206)   iohexol (OMNIPAQUE) 350 MG/ML injection (SINGLE-DOSE) 100 mL (100 mL Intravenous Given 12/29/22 1139)   potassium chloride (K-DUR,KLOR-CON) CR tablet 40 mEq (40 mEq Oral Given 12/29/22 1552)       Diagnostic Studies  Results Reviewed     Procedure Component Value Units Date/Time    Urine Microscopic [[id number]]  (Abnormal) Collected: 12/29/22 1017    Lab Status: Final result Specimen: Urine, Clean Catch Updated: 12/29/22 1048     RBC, UA 4-10 /hpf      WBC, UA 2-4 /hpf      Epithelial Cells Moderate /hpf      Bacteria, UA Occasional /hpf      MUCUS THREADS Occasional    Comprehensive metabolic panel [[id number]]  (Abnormal) Collected: 12/29/22 1016    Lab Status: Final result Specimen: Blood from Arm, Right Updated: 12/29/22 1047     Sodium 137 mmol/L      Potassium 3 3 mmol/L      Chloride 102 mmol/L      CO2 28 mmol/L      ANION GAP 7 mmol/L      BUN 10 mg/dL      Creatinine 0 70 mg/dL      Glucose 104 mg/dL      Calcium 9 0 mg/dL      AST 14 U/L      ALT 19 U/L      Alkaline Phosphatase 78 U/L      Total Protein 6 6 g/dL      Albumin 3 6 g/dL      Total Bilirubin 0 60 mg/dL      eGFR 99 ml/min/1 73sq m     Narrative:      [place] guidelines for Chronic Kidney Disease (CKD):   •  Stage 1 with normal or high GFR (GFR > 90 mL/min/1 73 square meters)  •  Stage 2 Mild CKD (GFR = 60-89 mL/min/1 73 square meters)  •  Stage 3A Moderate CKD (GFR = 45-59 mL/min/1 73 square meters)  •  Stage 3B Moderate CKD (GFR = 30-44 mL/min/1 73 square meters)  •  Stage 4 Severe CKD (GFR = 15-29 mL/min/1 73 square meters)  •  Stage 5 End Stage CKD (GFR <15 mL/min/1 73 square meters)  Note: GFR calculation is accurate only with a steady state creatinine    Lipase [[id number]]  (Abnormal) Collected: 12/29/22 1016    Lab Status: Final result Specimen: Blood from Arm, Right Updated: 12/29/22 1047     Lipase <6 u/L     CBC and differential [[id number]]  (Abnormal) Collected: 12/29/22 1016    Lab Status: Final result Specimen: Blood from Arm, Right Updated: 12/29/22 1032     WBC 8 00 Thousand/uL      RBC 5 29 Million/uL      Hemoglobin 14 8 g/dL      Hematocrit 45 4 %      MCV 86 fL      MCH 28 0 pg      MCHC 32 6 g/dL      RDW 12 7 %      MPV 10 7 fL      Platelets 524 Thousands/uL      nRBC 0 /100 WBCs      Neutrophils Relative 64 %      Immat GRANS % 1 %      Lymphocytes Relative 23 %      Monocytes Relative 9 %      Eosinophils Relative 2 %      Basophils Relative 1 %      Neutrophils Absolute 5 21 Thousands/µL      Immature Grans Absolute 0 04 Thousand/uL      Lymphocytes Absolute 1 86 Thousands/µL      Monocytes Absolute 0 69 Thousand/µL      Eosinophils Absolute 0 14 Thousand/µL      Basophils Absolute 0 06 Thousands/µL     UA w Reflex to Microscopic w Reflex to Culture [[id number]]  (Abnormal) Collected: 12/29/22 1017    Lab Status: Final result Specimen: Urine, Clean Catch Updated: 12/29/22 1029     Color, UA Yellow     Clarity, UA Turbid     Specific [place], UA 1 023     pH, UA 7 5     Leukocytes, UA Negative     Nitrite, UA Negative     Protein, UA 30 (1+) mg/dl      Glucose, UA Negative mg/dl      Ketones, UA Negative mg/dl      Urobilinogen, UA 2 0 mg/dl      Bilirubin, UA Negative Occult Blood, UA Negative    POCT pregnancy, urine [[id number]]  (Normal) Resulted: 12/29/22 1026    Lab Status: Final result Updated: 12/29/22 1027     EXT Preg Test, Ur Negative     Control Valid                 CT abdomen pelvis with contrast   Final Result by Annabel Burroughs MD (12/29 1246)      Colonic diverticulosis again identified with suggestion of fistulization with adjacent small bowel loops arising from the sigmoid in area of previously noted acute diverticulitis on the study of 7/6/2020  There is associated mural thickening of the    sigmoid in this region and mild proximal large bowel dilatation suggesting sigmoid stricture  Hepatic steatosis  Workstation performed: [id number]         XR chest 1 view portable   ED Interpretation by Tameka Guerin MD (12/29 1119)   No acute cardiopulmonary process noted  Unchanged from previous cxr on Jan 2017  Final Result by Lynell Dandy, MD (12/29 1141)      No acute cardiopulmonary disease  Findings are stable            Workstation performed: [id number]               Procedures  ECG 12 Lead Documentation Only    Date/Time: 12/29/2022 10:33 AM  Performed by: Tameka Guerin MD  Authorized by: Tameka Guerin MD       EKG December 29, 2022 at 1008 sinus arrhythmia rate of 72, normal [place] interval, normal QRS interval, , normal axis, T wave inversion in lead III and V1, T wave flattening in aVF, no STEMI, no ST depressions, no Brugada, no de Petty, no Wellens sign, no delta waves, previous EKG normal sinus rhythm with T wave inversions in lead III and V1 with T wave flattening and V2, V5, V6  No significant changes from previous EKG  ED Course  ED Course as of 12/29/22 1842   Thu Dec 29, 2022   1101 Potassium(!): 3 3   1101 Nitrite, UA: Negative   1102 Leukocytes, UA: Negative   1102 RBC, UA(!): 4-10   1112 Hematuria, will give urology consult     2463 Reached out to surgery regarding evaluation  MDM  Number of Diagnoses or Management Options  Acute diverticulitis  Hematuria  Hypokalemia  Diagnosis management comments: 59-year-old female presents with 1 week of abdominal pain  States previous history of diverticulitis treated with antibiotics  Decreased bowel movements  Sensation of feeling bloated  Noted tearing at the eyes during physical exam, nontoxic, not ill-appearing, cardiac exam within normal limits, lung sounds clear, abdomen distended and hypertympanic on percussion, normal bowel sounds  Differential includes but not limited to UTI, kidney stone, bowel obstruction, diverticulitis, obstipation, constipation  Hypokalemia noted on labs given potassium  Hematuria noted on UA referral to urology given  CT Abdo pelvis showed fistulization of large intestines of small intestine with possible stricture  Advised surgery of findings  Surgery advised admission         Amount and/or Complexity of Data Reviewed  Clinical lab tests: ordered and reviewed  Tests in the radiology section of CPT®: ordered and reviewed  Tests in the medicine section of CPT®: ordered and reviewed  Decide to obtain previous medical records or to obtain history from someone other than the patient: yes  Obtain history from someone other than the patient: yes  Review and summarize past medical records: yes  Discuss the patient with other providers: yes  Independent visualization of images, tracings, or specimens: yes        Disposition  Final diagnoses:   Hematuria   Hypokalemia   Acute diverticulitis     Time reflects when diagnosis was documented in both MDM as applicable and the Disposition within this note     Time User Action Codes Description Comment    12/29/2022 11:06 AM Dinorah Christine Add [R31 [id number]] Hematuria     12/29/2022 12:17 PM Dinorah Christine Add [E87 [id number]] Hypokalemia     12/29/2022  1:42 PM Dinorah Christine Add [K57 [id number]] Acute diverticulitis       ED Disposition     ED Disposition   Admit Condition   Stable    Date/Time   Thu Dec 29, 2022  1:42 PM    Comment   Case was discussed with Aziza and the patient's admission status was agreed to be Admission Status: inpatient status to the service of Dr Haydee Olivia   Follow-up Information     Follow up With Specialties Details Why Contact Info Additional [address] [facility] Schedule an appointment as soon as possible for a visit in 2 days  [address] [id number] [phone] [facility] [place], [address] [facility] [address] [place], [place], [address] [facility]          Current Discharge Medication List      CONTINUE these medications which have NOT CHANGED    Details   amLODIPine (NORVASC) 2 5 mg tablet Take 1 tablet by mouth daily for 20 days  Qty: 20 tablet, Refills: 0      hydrochlorothiazide (HYDRODIURIL) 25 mg tablet Take 25 mg by mouth daily               PDMP Review     None           ED Provider  Attending physically available and evaluated Miladys Simon I managed the patient along with the ED Attending      Electronically Signed by         Jacqueline Noel MD  12/29/22 8876

## 2022-12-29 NOTE — H&P
H&P Exam - General Surgery   Remonia Section 46 y o  female MRN: [id number]  Unit/Bed#: S -49 Encounter: [id number]    Assessment/Plan     Assessment:  47 yo female with pmhx of diverticulitis with microperforation presenting with 1 week of upper abdominal pain and bloating, likely acute sigmoid diverticulitis with CT evidence of possible colonic stricture  Afebrile, VSS  No leukocytosis  Hypokalemia  3 3    Plan:  -General surgery admission for likely acute diverticulitis   -Clear liquid diet  -IV abx  -IVF  -Lyte replacement   -Pain control PRN  -Outpatient c-scope in 6-8 weeks discussed with patient  Discussed with Dr Javier    History of Present Illness     HPI:  Emi Cunha is a 46 y o  female with pmhx of HTN and diverticulitis c/b microperforation in 2020 treated conservatively who presents with upper abdominal pain and bloating x 1 week  Patient reports upper abdominal/epigastric pain which is sharp, intermittent and worsened after eating  Patient states she feels the pain is from gas building up, as she describes the pain comes on in her upper abdomen and her stomach feels very hard she then "can feel air move through her intestines and come out as gas " Passing flatus does decrease the pain but she states not completely  At times the pain is severe enough to make her double over, but then resolves on its own  She has been taking Alleve OTC at night to help with pain  Patient reports she has not been taking her daily Metamucil over the last 2 weeks as she's been forgetting  She noticed her bowel movements are harder and smaller in size  Frequency is the same, every 2 days  Last BM this morning  Passing flatus today  Last c-scope 2020 after first episode of diverticulitis  Recommended to repeat in 3 years  Denies personal or family history of colon cancer or inflammatory bowel disease  No past surgical history  Review of Systems   Constitutional: Positive for appetite change  Negative for activity change, chills and fever  Respiratory: Negative for shortness of breath  Cardiovascular: Negative for chest pain  Gastrointestinal: Positive for abdominal distention, abdominal pain and constipation  Negative for diarrhea, nausea and vomiting  Genitourinary: Negative for difficulty urinating  Historical Information   Past Medical History:   Diagnosis Date   • Diverticulitis    • Diverticulosis    • Fibroids    • Hypertension      Past Surgical History:   Procedure Laterality Date   • TUBAL LIGATION  1991     Social History   Social History     Substance and Sexual Activity   Alcohol Use Yes   • Alcohol/week: 3 0 standard drinks   • Types: 3 Shots of liquor per week    Comment: socially     Social History     Substance and Sexual Activity   Drug Use No     Social History     Tobacco Use   Smoking Status Every Day   • Types: Cigars   Smokeless Tobacco Current     Family History: History reviewed  No pertinent family history  Meds/Allergies   PTA meds:   Prior to Admission Medications   Prescriptions Last Dose Informant Patient Reported? Taking?    amLODIPine (NORVASC) 2 5 mg tablet 12/29/2022  No Yes   Sig: Take 1 tablet by mouth daily for 20 days   Patient taking differently: Take 5 mg by mouth daily   hydrochlorothiazide (HYDRODIURIL) 25 mg tablet 12/29/2022  Yes Yes   Sig: Take 25 mg by mouth daily      Facility-Administered Medications: None     No Known Allergies    Objective   First Vitals:   Blood Pressure: (!) 172/88 (12/29/22 0935)  Pulse: 95 (12/29/22 0935)  Temperature: 97 7 °F (36 5 °C) (12/29/22 0935)  Temp Source: Oral (12/29/22 0935)  Respirations: 17 (12/29/22 0935)  Height: 4' 9" (144 8 cm) (12/29/22 1432)  Weight - Scale: 90 7 kg (200 lb) (12/29/22 0935)  SpO2: 99 % (12/29/22 0935)    Current Vitals:   Blood Pressure: 140/75 (12/29/22 1230)  Pulse: 78 (12/29/22 1230)  Temperature: 97 7 °F (36 5 °C) (12/29/22 0935)  Temp Source: Oral (12/29/22 6047)  Respirations: 18 (12/29/22 1230)  Height: 4' 9" (144 8 cm) (12/29/22 1432)  Weight - Scale: 90 7 kg (200 lb) (12/29/22 0935)  SpO2: 96 % (12/29/22 1230)    No intake or output data in the 24 hours ending 12/29/22 1447    Invasive Devices     Peripheral Intravenous Line  Duration           Peripheral IV 12/29/22 Right Antecubital <1 day                Physical Exam  Vitals reviewed  Constitutional:       General: She is not in acute distress  Appearance: Normal appearance  She is normal weight  She is not toxic-appearing  Cardiovascular:      Rate and Rhythm: Normal rate  Pulmonary:      Effort: Pulmonary effort is normal  No respiratory distress  Abdominal:      General: Abdomen is protuberant  There is distension (mild)  Palpations: Abdomen is soft  Tenderness: There is abdominal tenderness in the epigastric area  There is no guarding or rebound  Skin:     General: Skin is warm and dry  Neurological:      General: No focal deficit present  Mental Status: She is alert     Psychiatric:         Mood and Affect: Mood normal          Behavior: Behavior normal          Lab Results:   CBC:   Lab Results   Component Value Date    WBC 8 00 12/29/2022    HGB 14 8 12/29/2022    HCT 45 4 12/29/2022    MCV 86 12/29/2022     12/29/2022    MCH 28 0 12/29/2022    MCHC 32 6 12/29/2022    RDW 12 7 12/29/2022    MPV 10 7 12/29/2022    NRBC 0 12/29/2022   , CMP:   Lab Results   Component Value Date    SODIUM 137 12/29/2022    K 3 3 (L) 12/29/2022     12/29/2022    CO2 28 12/29/2022    BUN 10 12/29/2022    CREATININE 0 70 12/29/2022    CALCIUM 9 0 12/29/2022    AST 14 12/29/2022    ALT 19 12/29/2022    ALKPHOS 78 12/29/2022    EGFR 99 12/29/2022   , Coagulation: No results found for: PT, INR, APTT, Urinalysis:   Lab Results   Component Value Date    COLORU Yellow 12/29/2022    CLARITYU Turbid 12/29/2022    SPECGRAV 1 023 12/29/2022    PHUR 7 5 12/29/2022    LEUKOCYTESUR Negative 12/29/2022    NITRITE Negative 12/29/2022    GLUCOSEU Negative 12/29/2022    KETONESU Negative 12/29/2022    BILIRUBINUR Negative 12/29/2022    BLOODU Negative 12/29/2022   , Amylase: No results found for: AMYLASE, Lipase:   Lab Results   Component Value Date    LIPASE <6 (L) 12/29/2022     Imaging: I have personally reviewed pertinent reports  12/29 CTAP:  Colonic diverticulosis again identified with suggestion of fistulization with adjacent small bowel loops arising from the sigmoid in area of previously noted acute diverticulitis on the study of 7/6/2020  There is associated mural thickening of the   sigmoid in this region and mild proximal large bowel dilatation suggesting sigmoid stricture      Hepatic steatosis  EKG, Pathology, and Other Studies: I have personally reviewed pertinent reports  Code Status: Prior  Advance Directive and Living Will:      Power of :    POLST:      Counseling / Coordination of Care  Total floor / unit time spent today 30 minutes  Greater than 50% of total time was spent with the patient and / or family counseling and / or coordination of care  A description of the counseling / coordination of care: Humphrey Whitfield

## 2022-12-29 NOTE — ED ATTENDING ATTESTATION
12/29/2022  IMaine MD, saw and evaluated the patient  I have discussed the patient with the resident/non-physician practitioner and agree with the resident's/non-physician practitioner's findings, Plan of Care, and MDM as documented in the resident's/non-physician practitioner's note, except where noted  All available labs and Radiology studies were reviewed  I was present for key portions of any procedure(s) performed by the resident/non-physician practitioner and I was immediately available to provide assistance  At this point I agree with the current assessment done in the Emergency Department  I have conducted an independent evaluation of this patient a history and physical is as follows:      49-year-old presenting to the ER with abdominal pain, nausea  Tender to touch in upper abdomen    Agree with check abdominal labs, CT        ED Course         Critical Care Time  Procedures

## 2022-12-30 VITALS
OXYGEN SATURATION: 93 % | DIASTOLIC BLOOD PRESSURE: 90 MMHG | RESPIRATION RATE: 18 BRPM | SYSTOLIC BLOOD PRESSURE: 135 MMHG | BODY MASS INDEX: 43.15 KG/M2 | HEIGHT: 57 IN | HEART RATE: 60 BPM | WEIGHT: 200 LBS | TEMPERATURE: 97.8 F

## 2022-12-30 RX ORDER — METRONIDAZOLE 500 MG/1
500 TABLET ORAL EVERY 8 HOURS SCHEDULED
Qty: 21 TABLET | Refills: 0 | Status: SHIPPED | OUTPATIENT
Start: 2022-12-30 | End: 2023-01-06

## 2022-12-30 RX ORDER — AMOXICILLIN AND CLAVULANATE POTASSIUM 875; 125 MG/1; MG/1
1 TABLET, FILM COATED ORAL EVERY 12 HOURS SCHEDULED
Qty: 14 TABLET | Refills: 0 | Status: SHIPPED | OUTPATIENT
Start: 2022-12-30 | End: 2023-01-06

## 2022-12-30 RX ADMIN — OXYCODONE HYDROCHLORIDE 5 MG: 5 TABLET ORAL at 13:18

## 2022-12-30 RX ADMIN — OXYCODONE HYDROCHLORIDE 5 MG: 5 TABLET ORAL at 14:46

## 2022-12-30 RX ADMIN — PIPERACILLIN AND TAZOBACTAM 3.38 G: 36; 4.5 INJECTION, POWDER, FOR SOLUTION INTRAVENOUS at 02:36

## 2022-12-30 RX ADMIN — ENOXAPARIN SODIUM 40 MG: 40 INJECTION SUBCUTANEOUS at 08:59

## 2022-12-30 RX ADMIN — SODIUM CHLORIDE, POTASSIUM CHLORIDE, SODIUM LACTATE AND CALCIUM CHLORIDE 100 ML/HR: 600; 310; 30; 20 INJECTION, SOLUTION INTRAVENOUS at 02:30

## 2022-12-30 RX ADMIN — PIPERACILLIN AND TAZOBACTAM 3.38 G: 36; 4.5 INJECTION, POWDER, FOR SOLUTION INTRAVENOUS at 08:58

## 2022-12-30 RX ADMIN — OXYCODONE HYDROCHLORIDE 5 MG: 5 TABLET ORAL at 08:59

## 2022-12-30 RX ADMIN — PSYLLIUM HUSK 1 PACKET: 3.4 POWDER ORAL at 08:58

## 2022-12-30 NOTE — QUICK NOTE
Went to see patient at bedside  Patient states that she is doing well  She was informed that based on her CT scan it appears as if she has stricturing of her sigmoid colon  We will continue to treat with antibiotics and clear liquids for now  She says that her abdominal pain has improved and she endorses abdominal pain in the epigastric region and left lower quadrant  Currently has no chest pain, no shortness of breath, no nausea, and no vomiting  States that she believes her symptoms are due to her not taking her Metamucil  Patient was counseled that if she begins to have abdominal distention or does not have a bowel movement within the next couple days that there are medical adjuncts we can provide besides her Metamucil to assist with having a bowel movement  On physical exam, the patient is tender to palpation in the epigastric region  No tenderness to palpation in the RLQ or LLQ  Patient has no additional complaints at this point time

## 2022-12-30 NOTE — DISCHARGE INSTR - AVS FIRST PAGE
Continue full liquids (including anything that can be put in a ) until you have finished your full course of antibiotics  You may also have toast and crackers  After this, continue a low fiber diet for 3 weeks  (No salads, raw veggies or tough  meats i e  steak, pork chops)  In addition add Benefiber to your diet daily with at least 3 full glass of water or juice  Then you should transition to a high fiber diet to help prevent diverticulitis episodes in the future  Dedicated Bran cereal or Metamucil plus 3 glasses of water daily   (and you can stop Benefiber)  Diverticulitis   WHAT YOU NEED TO KNOW:   Diverticulitis is a condition that causes small pockets along your intestine called diverticula to become inflamed or infected  This is caused by hard bowel movements, food, or bacteria that get stuck in the pockets  DISCHARGE INSTRUCTIONS:   Return to the emergency department if:   You have bowel movement or foul-smelling discharge leaking from your vagina or in your urine  You have severe diarrhea  You urinate less than usual or not at all  You are not able to have a bowel movement  You cannot stop vomiting  You have severe abdominal pain, a fever, and your abdomen is larger than usual     You have new or increased blood in your bowel movements  Call your doctor if:   You have pain when you urinate  Your symptoms get worse or do not go away  You have questions or concerns about your condition or care  Medicines:   Antibiotics  may be given to help treat a bacterial infection  Prescription pain medicine  may be given  Ask your healthcare provider how to take this medicine safely  Some prescription pain medicines contain acetaminophen  Do not take other medicines that contain acetaminophen without talking to your healthcare provider  Too much acetaminophen may cause liver damage  Prescription pain medicine may cause constipation   Ask your healthcare provider how to prevent or treat constipation  Take your medicine as directed  Contact your healthcare provider if you think your medicine is not helping or if you have side effects  Tell him or her if you are allergic to any medicine  Keep a list of the medicines, vitamins, and herbs you take  Include the amounts, and when and why you take them  Bring the list or the pill bottles to follow-up visits  Carry your medicine list with you in case of an emergency  Clear liquid diet:  A clear liquid diet includes any liquids that you can see through  Examples include water, ginger-tian, cranberry or apple juice, frozen fruit ice, or broth  Stay on a clear liquid diet until your symptoms are gone, or as directed  Follow up with your doctor as directed: You may need to return for a colonoscopy  When your symptoms are gone, you may need a low-fat, high-fiber diet to prevent diverticulitis from developing again  Your healthcare provider or dietitian can help you create meal plans  Write down your questions so you remember to ask them during your visits  © Copyright [employer] 2022 Information is for End User's use only and may not be sold, redistributed or otherwise used for commercial purposes  All illustrations and images included in CareNotes® are the copyrighted property of [employer]  or [facility]  The above information is an  only  It is not intended as medical advice for individual conditions or treatments  Talk to your doctor, nurse or pharmacist before following any medical regimen to see if it is safe and effective for you  Full Liquid Diet   A full liquid diet  includes only liquids and foods that are liquid at room temperature  You may need to follow this diet if you have trouble chewing or swallowing  You may also need to follow this diet if you have a severe intestinal illness or had surgery on your intestine   Your healthcare provider will tell you how long to follow this diet and when to start solid foods  Liquids and foods to include:   Fruit juices or pureed fruit without seeds    Vegetable juices or pureed vegetables in broth     Broth or strained cream soups    Refined and strained cooked cereals thinned with milk or half-and-half creamer    Flavored gelatin without chunks of fruit    Plain ice cream, milk shakes, sherbet, or popsicles    Yogurt, pudding, or soft custard    Milk or liquid nutrition supplements    Coffee, tea, sodas, water, or sports drinks    Butter, margarine, or cream      Low Fiber Diet   WHAT YOU NEED TO KNOW:   A low-fiber diet limits foods that are high in fiber  Fiber is the part of fruits, vegetables, and grains that is not broken down by your body  You may need to follow this diet after surgery on your intestines  You may also need to follow this diet for certain conditions such as Crohn disease or ulcerative colitis  Ask your healthcare provider or dietitian how much fiber you can have each day  DISCHARGE INSTRUCTIONS:   Foods to include:  Read food labels to check the amount of fiber that are found in foods  Some foods that are low in fiber include the following:  Grains:  Choose grains that have less than 2 grams of fiber in each serving   Examples include the following:    Cream of wheat and finely ground grits    Dry cereal made from rice    White bread, white pasta, and white rice    Crackers, bagels, and rolls made from white or refined flour    Other foods:      Canned and well-cooked fruit without skins or seeds, and juice without pulp    Ripe bananas and melons    Canned and well-cooked vegetables without skins or seeds, and vegetable juice    Cow's milk, lactose-free milk, soy milk, and rice milk    Yogurt without nuts, fruit, or granola    Eggs, poultry (such as chicken and turkey), fish, and tender, ground, well-cooked beef    Tofu and smooth peanut butter    Broth and strained soups made of low-fiber foods    Foods to avoid:   Breads, cereals, crackers, and pasta made with whole wheat or whole grains (such as whole oats)    [employer], wild rice, quinoa, kasha, and barley    All fresh fruit with skin, except banana and melons    Dried fruits and fruit juice with pulp    Canned pineapple    Raw vegetables    Nuts, seeds, and popcorn    Beans, nuts, peas, and lentils    Tough meats    Coconut and avocado    High Fiber Diet   WHAT YOU NEED TO KNOW:   A high-fiber diet includes foods that have a high amount of fiber  Fiber is the part of fruits, vegetables, and grains that is not broken down by your body  Fiber keeps your bowel movements regular  Fiber can also help lower your cholesterol level, control blood sugar in people with diabetes, and relieve constipation  Fiber can also help you control your weight because it helps you feel full faster  Most adults should eat 25 to 35 grams of fiber each day  Talk to your dietitian or healthcare provider about the amount of fiber you need  DISCHARGE INSTRUCTIONS:   Good sources of fiber:    Foods with at least 4 grams of fiber per serving:      ? to ½ cup of high-fiber cereal (check the nutrition label on the box)    ½ cup of blackberries or raspberries    4 dried prunes    1 cooked artichoke    ½ cup of cooked legumes, such as lentils, or red, kidney, and alves beans    Foods with 1 to 3 grams of fiber per servin slice of whole-wheat, pumpernickel, or rye bread    ½ cup of cooked brown rice    4 whole-wheat crackers    1 cup of oatmeal    ½ cup of cereal with 1 to 3 grams of fiber per serving (check the nutrition label on the box)    1 small piece of fruit, such as an apple, banana, pear, kiwi, or orange    3 dates    ½ cup of canned apricots, fruit cocktail, peaches, or pears    ½ cup of raw or cooked vegetables, such as carrots, cauliflower, cabbage, spinach, squash, or corn    Ways that you can increase fiber in your diet:   Choose brown or wild rice instead of white rice       Use whole wheat flour in recipes instead of white or all-purpose flour  Add beans and peas to casseroles or soups  Choose fresh fruit and vegetables with peels or skins on instead of juices  Other diet guidelines to follow: Add fiber to your diet slowly  You may have abdominal discomfort, bloating, and gas if you add fiber to your diet too quickly  Drink plenty of liquids as you add fiber to your diet  You may have nausea or develop constipation if you do not drink enough water  Ask how much liquid to drink each day and which liquids are best for you

## 2022-12-30 NOTE — MALNUTRITION/BMI
This medical record reflects one or more clinical indicators suggestive of malnutrition and/or morbid obesity  BMI Findings:  Adult BMI Classifications: Morbid Obesity 40-44 9        Body mass index is 43 28 kg/m²  See Nutrition note dated 12/30/2022 for additional details  Completed nutrition assessment is viewable in the nutrition documentation

## 2022-12-30 NOTE — PLAN OF CARE
Problem: Nutrition/Hydration-ADULT  Goal: Nutrient/Hydration intake appropriate for improving, restoring or maintaining nutritional needs  Description: Monitor and assess patient's nutrition/hydration status for malnutrition  Collaborate with interdisciplinary team and initiate plan and interventions as ordered  Monitor patient's weight and dietary intake as ordered or per policy  Utilize nutrition screening tool and intervene as necessary  Determine patient's food preferences and provide high-protein, high-caloric foods as appropriate       INTERVENTIONS:  - Monitor oral intake, urinary output, labs, and treatment plans  - Assess nutrition and hydration status and recommend course of action  - Evaluate amount of meals eaten  - Assist patient with eating if necessary   - Allow adequate time for meals  - Recommend/ encourage appropriate diets, oral nutritional supplements, and vitamin/mineral supplements  - Order, calculate, and assess calorie counts as needed  - Recommend, monitor, and adjust tube feedings and TPN/PPN based on assessed needs  - Assess need for intravenous fluids  - Provide specific nutrition/hydration education as appropriate  - Include patient/family/caregiver in decisions related to nutrition  Outcome: Progressing     Problem: PAIN - ADULT  Goal: Verbalizes/displays adequate comfort level or baseline comfort level  Description: Interventions:  - Encourage patient to monitor pain and request assistance  - Assess pain using appropriate pain scale  - Administer analgesics based on type and severity of pain and evaluate response  - Implement non-pharmacological measures as appropriate and evaluate response  - Consider cultural and social influences on pain and pain management  - Notify physician/advanced practitioner if interventions unsuccessful or patient reports new pain  Outcome: Progressing     Problem: INFECTION - ADULT  Goal: Absence or prevention of progression during hospitalization  Description: INTERVENTIONS:  - Assess and monitor for signs and symptoms of infection  - Monitor lab/diagnostic results  - Monitor all insertion sites, i e  indwelling lines, tubes, and drains  - Monitor endotracheal if appropriate and nasal secretions for changes in amount and color  - [place] appropriate cooling/warming therapies per order  - Administer medications as ordered  - Instruct and encourage patient and family to use good hand hygiene technique  - Identify and instruct in appropriate isolation precautions for identified infection/condition  Outcome: Progressing  Goal: Absence of fever/infection during neutropenic period  Description: INTERVENTIONS:  - Monitor WBC    Outcome: Progressing     Problem: SAFETY ADULT  Goal: Patient will remain free of falls  Description: INTERVENTIONS:  - Educate patient/family on patient safety including physical limitations  - Instruct patient to call for assistance with activity   - Consult OT/PT to assist with strengthening/mobility   - Keep Call bell within reach  - Keep bed low and locked with side rails adjusted as appropriate  - Keep care items and personal belongings within reach  - Initiate and maintain comfort rounds  - Make Fall Risk Sign visible to staff  - Offer Toileting every 2 Hours, in advance of need  - Obtain necessary fall risk management equipment  - Apply yellow socks and bracelet for high fall risk patients  - Consider moving patient to room near nurses station  Outcome: Progressing     Problem: DISCHARGE PLANNING  Goal: Discharge to home or other facility with appropriate resources  Description: INTERVENTIONS:  - Identify barriers to discharge w/patient and caregiver  - Arrange for needed discharge resources and transportation as appropriate  - Identify discharge learning needs (meds, wound care, etc )  - Arrange for interpretive services to assist at discharge as needed  - Refer to Case Management Department for coordinating discharge planning if the patient needs post-hospital services based on physician/advanced practitioner order or complex needs related to functional status, cognitive ability, or social support system  Outcome: Progressing     Problem: Knowledge Deficit  Goal: Patient/family/caregiver demonstrates understanding of disease process, treatment plan, medications, and discharge instructions  Description: Complete learning assessment and assess knowledge base    Interventions:  - Provide teaching at level of understanding  - Provide teaching via preferred learning methods  Outcome: Progressing

## 2022-12-30 NOTE — PROGRESS NOTES
Progress Note - General Surgery   MISTI Resident on Surgery Service   Albino Braxton 46 y o  female MRN: [id number]  Unit/Bed#: S -70 Encounter: [id number]    Assessment:  47 yo F with stricturing of sigmoid colon, clinical symptoms of diverticulitis  Being treated conservatively with IV ABX and gradual increase in diet  Afebrile  VSS   UOP 1850  No labs    Plan:  - Advance to regular diet  - DC IV fluids  - IV ABX  - PRN pain meds  - DVT prophylaxis  - OOB and ambulating  - Serial abdominal exams  - Outpatient c-scope in 6-8 weeks  - Discharge today if having bowel function and clinically stable    Subjective/Objective     Subjective: NAEO  Patient states her abdominal pain in minimal  No bowel movements  Passing flatus  Producing urine w/o difficutly  Patient states overall she feels well  Objective:     Blood pressure 135/90, pulse 60, temperature 97 8 °F (36 6 °C), resp  rate 18, height 4' 9" (1 448 m), weight 90 7 kg (200 lb), SpO2 93 %  ,Body mass index is 43 28 kg/m²  Intake/Output Summary (Last 24 hours) at 12/30/2022 0821  Last data filed at 12/30/2022 0501  Gross per 24 hour   Intake 1480 ml   Output 1850 ml   Net -370 ml       Invasive Devices     Peripheral Intravenous Line  Duration           Peripheral IV 12/29/22 Right Antecubital <1 day                General: NAD  HENT: NCAT MMM  Neck: supple, no JVD  CV: nl rate  Lungs: nl wob  No resp distress  ABD: Soft, nondistended, tender to palpation in the LUQ and epigastric region  Which is different from overnight as she mainly had tenderness in the epigastric region only    Extrem: No CCE  Neuro: AAOx3       Scheduled Meds:  Current Facility-Administered Medications   Medication Dose Route Frequency Provider Last Rate   • acetaminophen  650 mg Oral Q6H PRN Rosaura Ervin PA-C     • enoxaparin  40 mg Subcutaneous BID Rosaura Ervin PA-C     • ondansetron  4 mg Intravenous Q6H PRN Tomy ADWOA     • oxyCODONE  2 5 mg Oral Q4H PRN Rosaura Ervin PA-C     • oxyCODONE  5 mg Oral Q4H PRN Rosaura Ervin PA-C     • piperacillin-tazobactam  3 375 g Intravenous Q6H Rosaura Ervin PA-C 3 375 g (12/30/22 0236)   • psyllium  1 packet Oral Daily Rosaura Ervin PA-C       Continuous Infusions:   PRN Meds: •  acetaminophen  •  ondansetron  •  oxyCODONE  •  oxyCODONE      Lab, Imaging and other studies:  I have personally reviewed pertinent lab results    , CBC:   Lab Results   Component Value Date    WBC 8 00 12/29/2022    HGB 14 8 12/29/2022    HCT 45 4 12/29/2022    MCV 86 12/29/2022     12/29/2022    MCH 28 0 12/29/2022    MCHC 32 6 12/29/2022    RDW 12 7 12/29/2022    MPV 10 7 12/29/2022    NRBC 0 12/29/2022   , CMP:   Lab Results   Component Value Date    SODIUM 137 12/29/2022    K 3 3 (L) 12/29/2022     12/29/2022    CO2 28 12/29/2022    BUN 10 12/29/2022    CREATININE 0 70 12/29/2022    CALCIUM 9 0 12/29/2022    AST 14 12/29/2022    ALT 19 12/29/2022    ALKPHOS 78 12/29/2022    EGFR 99 12/29/2022   , Coagulation: No results found for: PT, INR, APTT, Urinalysis:   Lab Results   Component Value Date    COLORU Yellow 12/29/2022    CLARITYU Turbid 12/29/2022    SPECGRAV 1 023 12/29/2022    PHUR 7 5 12/29/2022    LEUKOCYTESUR Negative 12/29/2022    NITRITE Negative 12/29/2022    GLUCOSEU Negative 12/29/2022    KETONESU Negative 12/29/2022    BILIRUBINUR Negative 12/29/2022    BLOODU Negative 12/29/2022   , Amylase: No results found for: AMYLASE, Lipase:   Lab Results   Component Value Date    LIPASE <6 (L) 12/29/2022     VTE Pharmacologic Prophylaxis: Enoxaparin (Lovenox)  VTE Mechanical Prophylaxis: sequential compression device      Netta Troncoso MD  12/30/2022 8:21 AM

## 2022-12-30 NOTE — UTILIZATION REVIEW
Initial Clinical Review    Admission: Date/Time/Statement:   Admission Orders (From admission, onward)     Ordered        12/29/22 1423  Place in Observation  Once                      Orders Placed This Encounter   Procedures   • Place in Observation     Standing Status:   Standing     Number of Occurrences:   1     Order Specific Question:   Level of Care     Answer:   Med Surg [16]     ED Arrival Information     Expected   -    Arrival   12/29/2022 09:26    Acuity   Urgent            Means of arrival   Walk-In    Escorted by   Self    Service   Surgery-General    Admission type   Emergency            Arrival complaint   Abdominal Pain            Chief Complaint   Patient presents with   • Abdominal Pain     Upper abd pain for 1 week  Sharp constant pains, feels gas bubbles through her belly  Denies n/v/d  Occasional belching          Initial Presentation: 46 y o  female to ED via walk-in from home  Admitted OBS with Sigmoid Diverticulitis   Presents with upper abdominal/epigastric pain which is sharp, intermittent and worsened after eating; on exam Abdomen mild distension / tenderness in epigastric area; /88; K 3 3  PMHX: Diverticulitis; HTN  PLAN: Abx iv; clear liq; pain meds; serial abd exams      Date:    Day 2:     ED Triage Vitals   Temperature Pulse Respirations Blood Pressure SpO2   12/29/22 0935 12/29/22 0935 12/29/22 0935 12/29/22 0935 12/29/22 0935   97 7 °F (36 5 °C) 95 17 (!) 172/88 99 %      Temp Source Heart Rate Source Patient Position - Orthostatic VS BP Location FiO2 (%)   12/29/22 0935 12/29/22 0935 12/29/22 1450 12/29/22 0935 --   Oral Monitor Sitting Right arm       Pain Score       12/29/22 0935       No Pain          Wt Readings from Last 1 Encounters:   12/29/22 90 7 kg (200 lb)     Additional Vital Signs:   Date/Time Temp Pulse Resp BP MAP (mmHg) SpO2 O2 Device Patient Position - Orthostatic VS   12/30/22 07:46:22 97 8 °F (36 6 °C) 60 18 135/90 105 93 % -- --   12/29/22 22:09:10 98 3 °F (36 8 °C) 73 16 130/75 93 92 % -- --   12/29/22 2133 -- -- -- -- -- -- None (Room air) --   12/29/22 14:50:49 97 5 °F (36 4 °C) 67 18 139/83 102 93 % -- Sitting   12/29/22 1230 -- 78 18 140/75 102 96 % -- --   12/29/22 0935 97 7 °F (36 5 °C) 95 17 172/88 Abnormal  -- 99 % None (Room air) --         EKG: Normal sinus rhythm with sinus arrhythmia  Low voltage QRS  Borderline ECG  When compared with ECG of 06-JUL-2020 12:32,  No significant change was found      Pertinent Labs/Diagnostic Test Results:   CT abdomen pelvis with contrast   Final Result by Benson Hargrove MD (12/29 1246)      Colonic diverticulosis again identified with suggestion of fistulization with adjacent small bowel loops arising from the sigmoid in area of previously noted acute diverticulitis on the study of 7/6/2020  There is associated mural thickening of the    sigmoid in this region and mild proximal large bowel dilatation suggesting sigmoid stricture  Hepatic steatosis  Workstation performed: [id number]         XR chest 1 view portable   ED Interpretation by Thom Beltre MD (12/29 1119)   No acute cardiopulmonary process noted  Unchanged from previous cxr on Jan 2017  Final Result by Sergey Dalal MD (12/29 1141)      No acute cardiopulmonary disease        Findings are stable            Workstation performed: [id number]               Results from last 7 days   Lab Units 12/29/22  1016   WBC Thousand/uL 8 00   HEMOGLOBIN g/dL 14 8   HEMATOCRIT % 45 4   PLATELETS Thousands/uL 257   NEUTROS ABS Thousands/µL 5 21         Results from last 7 days   Lab Units 12/29/22  1016   SODIUM mmol/L 137   POTASSIUM mmol/L 3 3*   CHLORIDE mmol/L 102   CO2 mmol/L 28   ANION GAP mmol/L 7   BUN mg/dL 10   CREATININE mg/dL 0 70   EGFR ml/min/1 73sq m 99   CALCIUM mg/dL 9 0     Results from last 7 days   Lab Units 12/29/22  1016   AST U/L 14   ALT U/L 19   ALK PHOS U/L 78   TOTAL PROTEIN g/dL 6 6   ALBUMIN g/dL 3 6   TOTAL BILIRUBIN mg/dL 0 60         Results from last 7 days   Lab Units 12/29/22  1016   GLUCOSE RANDOM mg/dL 104             Results from last 7 days   Lab Units 12/29/22  1016   LIPASE u/L <6*         Results from last 7 days   Lab Units 12/29/22  1017   CLARITY UA  Turbid   COLOR UA  Yellow   SPEC GRAV UA  1 023   PH UA  7 5   GLUCOSE UA mg/dl Negative   KETONES UA mg/dl Negative   BLOOD UA  Negative   PROTEIN UA mg/dl 30 (1+)*   NITRITE UA  Negative   BILIRUBIN UA  Negative   UROBILINOGEN UA (BE) mg/dl 2 0*   LEUKOCYTES UA  Negative   WBC UA /hpf 2-4*   RBC UA /hpf 4-10*   BACTERIA UA /hpf Occasional   EPITHELIAL CELLS WET PREP /hpf Moderate*   MUCUS THREADS  Occasional*       ED Treatment:   Medication Administration from 12/29/2022 0926 to 12/29/2022 1444       Date/Time Order Dose Route Action Comments     12/29/2022 1206 EST potassium chloride (K-DUR,KLOR-CON) CR tablet 20 mEq 20 mEq Oral Given --     12/29/2022 1206 EST magnesium oxide (MAG-OX) tablet 400 mg 400 mg Oral Given --     12/29/2022 1139 EST iohexol (OMNIPAQUE) 350 MG/ML injection (SINGLE-DOSE) 100 mL 100 mL Intravenous Given --        Present on Admission:  • Pain of upper abdomen      Admitting Diagnosis: Hypokalemia [E87 6]  Abdominal pain [R10 9]  Hematuria [R31 9]  Acute diverticulitis [K57 92]  Age/Sex: 46 y o  female     Admission Orders:      Scheduled Medications:  enoxaparin, 40 mg, Subcutaneous, BID  piperacillin-tazobactam, 3 375 g, Intravenous, Q6H  psyllium, 1 packet, Oral, Daily      Continuous IV Infusions:     PRN Meds:  acetaminophen, 650 mg, Oral, Q6H PRN  ondansetron, 4 mg, Intravenous, Q6H PRN  oxyCODONE, 2 5 mg, Oral, Q4H PRN  oxyCODONE, 5 mg, Oral, Q4H PRN        Network Utilization Review Department  ATTENTION: Please call with any questions or concerns to [phone] and carefully listen to the prompts so that you are directed to the right person   All voicemails are confidential   Dov Ramos all requests for admission clinical reviews, approved or denied determinations and any other requests to dedicated fax number below belonging to the campus where the patient is receiving treatment   List of dedicated fax numbers for the Facilities:  [address] [place] [address] DENIALS (Administrative/Medical Necessity) [phone]   [address] (Maternity/NICU/Pediatrics) [phone]   [address] [phone]   [facility] [phone]   [address] [address] [facility] [phone]   [address] [facility] 134 815 [facility] [phone]

## 2023-01-02 ENCOUNTER — NURSE TRIAGE (OUTPATIENT)
Dept: OTHER | Facility: OTHER | Age: 53
End: 2023-01-02

## 2023-01-02 ENCOUNTER — TELEPHONE (OUTPATIENT)
Dept: OTHER | Facility: OTHER | Age: 53
End: 2023-01-02

## 2023-01-02 NOTE — TELEPHONE ENCOUNTER
Patient is calling to schedule a colonoscopy with Dr Zachariah Antoine  Patient's last colonoscopy was 9/15/2020

## 2023-01-02 NOTE — TELEPHONE ENCOUNTER
Regarding: Discharged from [address] [place] [address] for Diverticulitis, Now Vomiting, Fatigued, Feels Bad  ----- Message from [facility] sent at 1/2/2023 11:07 AM EST -----  " I was in the Hospital for Acute Diverticulitis last week on Thursday and Friday, I am home now but feel very bad, I am tired, vomited yesterday and today , I don't feel well  "

## 2023-01-02 NOTE — TELEPHONE ENCOUNTER
Patient was advised to follow up with out of network PCP if not feeling better by tomorrow  Patient was advised to go to the emergency room for further care if dizziness becomes more severe  Reason for Disposition  • [1] MILD dizziness (e g , walking normally) AND [2] has NOT been evaluated by physician for this  (Exception: dizziness caused by heat exposure, sudden standing, or poor fluid intake)    Answer Assessment - Initial Assessment Questions  1  DESCRIPTION: "Describe your dizziness "      "I feel off "   2  LIGHTHEADED: "Do you feel lightheaded?" (e g , somewhat faint, woozy, weak upon standing)       Denies   3  VERTIGO: "Do you feel like either you or the room is spinning or tilting?" (i e  vertigo)      Denies   4  SEVERITY: "How bad is it?"  "Do you feel like you are going to faint?" "Can you stand and walk?"    - MILD: Feels slightly dizzy, but walking normally  - MODERATE: Feels very unsteady when walking, but not falling; interferes with normal activities (e g , school, work)   - SEVERE: Unable to walk without falling, or requires assistance to walk without falling; feels like passing out now  Mild   5  ONSET:  "When did the dizziness begin?"      Yesterday   6  AGGRAVATING FACTORS: "Does anything make it worse?" (e g , standing, change in head position)      Denies   7  HEART RATE: "Can you tell me your heart rate?" "How many beats in 15 seconds?"  (Note: not all patients can do this)        Unknown   8  CAUSE: "What do you think is causing the dizziness?"      Unknown   9  RECURRENT SYMPTOM: "Have you had dizziness before?" If Yes, ask: "When was the last time?" "What happened that time?"      Denies   10  OTHER SYMPTOMS: "Do you have any other symptoms?" (e g , fever, chest pain, vomiting, diarrhea, bleeding)       Vomiting since Saturday, decrease appetite "I am drinking ", nausea   11   PREGNANCY: "Is there any chance you are pregnant?" "When was your last menstrual period?" Denies    Protocols used: [facility]

## 2023-01-19 ENCOUNTER — OFFICE VISIT (OUTPATIENT)
Dept: SURGERY | Facility: CLINIC | Age: 53
End: 2023-01-19

## 2023-01-19 VITALS
WEIGHT: 200 LBS | HEART RATE: 89 BPM | HEIGHT: 57 IN | SYSTOLIC BLOOD PRESSURE: 151 MMHG | BODY MASS INDEX: 43.15 KG/M2 | DIASTOLIC BLOOD PRESSURE: 92 MMHG

## 2023-01-19 DIAGNOSIS — K57.32 SIGMOID DIVERTICULITIS: Primary | ICD-10-CM

## 2023-01-19 RX ORDER — AMLODIPINE BESYLATE 10 MG/1
10 TABLET ORAL DAILY
COMMUNITY
Start: 2022-12-15

## 2023-01-19 NOTE — PROGRESS NOTES
Assessment/Plan:    Diagnoses and all orders for this visit:    Sigmoid diverticulitis  -     CT small bowel enterography; Future    Feels quite well  May resume high-fiber diet  She takes Benefiber on a daily basis  During hospitalization at the end of December there was a question of small bowel fistula to the sigmoid colon  Will order CT enterography to further evaluate this region  Colonoscopy in September 2020 did reveal small sigmoid polyps  We will plan a colonoscopy once the CT enterography is done for further evaluation  Subjective:      Patient ID: Shannon Sims is a 46 y o  female  Patient presents for hospital follow up  She was seen in the Emergency Room on 12/30/2022 for diverticulitis  CT abdomen pelvis 12/29/2022   IMPRESSION:  Colonic diverticulosis again identified with suggestion of fistulization with adjacent small bowel loops arising from the sigmoid in area of previously noted acute diverticulitis on the study of 7/6/2020  There is associated mural thickening of the   sigmoid in this region and mild proximal large bowel dilatation suggesting sigmoid stricture  Hepatic steatosis  Patient presents for pre colonoscopy consult  Last colonoscopy was 9/15/2020   2 small benign sigmoid polyps were noted    Only feels quite well  She is back to her normal self  Since her last colonoscopy she has never had any bowel problems she is taking Benefiber daily without any issues  Denies constipation  Her presentation to the emergency room was consistent mostly with epigastric pain and a feeling of fullness  She has never had this before  She passed some flatus, she felt better  She was admitted for IV antibiotics given her CT readings from 1229    Presently she feels quite good          The following portions of the patient's history were reviewed and updated as appropriate:     She  has a past medical history of Diverticulitis, Diverticulosis, Fibroids, and Hypertension  She  has a past surgical history that includes Tubal ligation (1991)  Her family history is not on file  She  reports that she has been smoking cigars  She uses smokeless tobacco  She reports current alcohol use of about 3 0 standard drinks per week  She reports that she does not use drugs  Current Outpatient Medications   Medication Sig Dispense Refill   • amLODIPine (NORVASC) 10 mg tablet Take 10 mg by mouth daily     • amLODIPine (NORVASC) 2 5 mg tablet Take 1 tablet by mouth daily for 20 days (Patient taking differently: Take 5 mg by mouth daily) 20 tablet 0   • Cholecalciferol 125 MCG (5000 UT) capsule Take 5,000 Units by mouth daily     • hydrochlorothiazide (HYDRODIURIL) 25 mg tablet Take 25 mg by mouth daily       No current facility-administered medications for this visit  She has No Known Allergies       Review of Systems   All other systems reviewed and are negative  Objective:      /92   Pulse 89   Ht 4' 9" (1 448 m)   Wt 90 7 kg (200 lb)   BMI 43 28 kg/m²          Physical Exam  Constitutional:       General: She is not in acute distress  Abdominal:      General: Bowel sounds are normal       Palpations: Abdomen is soft  There is no mass  Tenderness: There is no abdominal tenderness

## 2023-02-06 ENCOUNTER — HOSPITAL ENCOUNTER (OUTPATIENT)
Dept: CT IMAGING | Facility: HOSPITAL | Age: 53
Discharge: HOME | End: 2023-02-06
Attending: SURGERY

## 2023-02-06 DIAGNOSIS — K57.32 SIGMOID DIVERTICULITIS: ICD-10-CM

## 2023-02-06 RX ADMIN — IOHEXOL 100 ML: 350 INJECTION, SOLUTION INTRAVENOUS at 15:55

## 2023-02-13 ENCOUNTER — TREATMENT (OUTPATIENT)
Dept: SURGERY | Facility: CLINIC | Age: 53
End: 2023-02-13

## 2023-02-13 DIAGNOSIS — K57.32 SIGMOID DIVERTICULITIS: Primary | ICD-10-CM

## 2023-03-08 ENCOUNTER — APPOINTMENT (INPATIENT)
Dept: RADIOLOGY | Facility: HOSPITAL | Age: 53
End: 2023-03-08

## 2023-03-08 ENCOUNTER — APPOINTMENT (EMERGENCY)
Dept: CT IMAGING | Facility: HOSPITAL | Age: 53
End: 2023-03-08

## 2023-03-08 ENCOUNTER — HOSPITAL ENCOUNTER (INPATIENT)
Facility: HOSPITAL | Age: 53
LOS: 4 days | Discharge: HOME | End: 2023-03-12
Attending: EMERGENCY MEDICINE | Admitting: COLON & RECTAL SURGERY

## 2023-03-08 DIAGNOSIS — R10.9 ABDOMINAL PAIN: ICD-10-CM

## 2023-03-08 DIAGNOSIS — K56.609 BOWEL OBSTRUCTION (HCC): Primary | ICD-10-CM

## 2023-03-08 DIAGNOSIS — K63.2 ENTEROCOLIC FISTULA: ICD-10-CM

## 2023-03-08 LAB
ALBUMIN SERPL BCP-MCNC: 4.1 G/DL (ref 3.5–5)
ALP SERPL-CCNC: 80 U/L (ref 34–104)
ALT SERPL W P-5'-P-CCNC: 21 U/L (ref 7–52)
ANION GAP SERPL CALCULATED.3IONS-SCNC: 9 MMOL/L (ref 4–13)
AST SERPL W P-5'-P-CCNC: 19 U/L (ref 13–39)
BACTERIA UR QL AUTO: ABNORMAL /HPF
BASOPHILS # BLD AUTO: 0.07 THOUSANDS/ÂΜL (ref 0–0.1)
BASOPHILS NFR BLD AUTO: 1 % (ref 0–1)
BILIRUB SERPL-MCNC: 0.89 MG/DL (ref 0.2–1)
BILIRUB UR QL STRIP: NEGATIVE
BUN SERPL-MCNC: 11 MG/DL (ref 5–25)
CALCIUM SERPL-MCNC: 9.4 MG/DL (ref 8.4–10.2)
CHLORIDE SERPL-SCNC: 101 MMOL/L (ref 96–108)
CLARITY UR: ABNORMAL
CO2 SERPL-SCNC: 26 MMOL/L (ref 21–32)
COLOR UR: YELLOW
CREAT SERPL-MCNC: 0.67 MG/DL (ref 0.6–1.3)
EOSINOPHIL # BLD AUTO: 0.1 THOUSAND/ÂΜL (ref 0–0.61)
EOSINOPHIL NFR BLD AUTO: 1 % (ref 0–6)
ERYTHROCYTE [DISTWIDTH] IN BLOOD BY AUTOMATED COUNT: 13.3 % (ref 11.6–15.1)
GFR SERPL CREATININE-BSD FRML MDRD: 101 ML/MIN/1.73SQ M
GLUCOSE SERPL-MCNC: 94 MG/DL (ref 65–140)
GLUCOSE UR STRIP-MCNC: NEGATIVE MG/DL
HCT VFR BLD AUTO: 50.9 % (ref 34.8–46.1)
HGB BLD-MCNC: 16.6 G/DL (ref 11.5–15.4)
HGB UR QL STRIP.AUTO: NEGATIVE
HYALINE CASTS #/AREA URNS LPF: ABNORMAL /LPF
IMM GRANULOCYTES # BLD AUTO: 0.07 THOUSAND/UL (ref 0–0.2)
IMM GRANULOCYTES NFR BLD AUTO: 1 % (ref 0–2)
KETONES UR STRIP-MCNC: NEGATIVE MG/DL
LEUKOCYTE ESTERASE UR QL STRIP: ABNORMAL
LIPASE SERPL-CCNC: <6 U/L (ref 11–82)
LYMPHOCYTES # BLD AUTO: 1.81 THOUSANDS/ÂΜL (ref 0.6–4.47)
LYMPHOCYTES NFR BLD AUTO: 14 % (ref 14–44)
MAGNESIUM SERPL-MCNC: 2.1 MG/DL (ref 1.9–2.7)
MCH RBC QN AUTO: 28.7 PG (ref 26.8–34.3)
MCHC RBC AUTO-ENTMCNC: 32.6 G/DL (ref 31.4–37.4)
MCV RBC AUTO: 88 FL (ref 82–98)
MONOCYTES # BLD AUTO: 0.97 THOUSAND/ÂΜL (ref 0.17–1.22)
MONOCYTES NFR BLD AUTO: 8 % (ref 4–12)
NEUTROPHILS # BLD AUTO: 9.59 THOUSANDS/ÂΜL (ref 1.85–7.62)
NEUTS SEG NFR BLD AUTO: 75 % (ref 43–75)
NITRITE UR QL STRIP: NEGATIVE
NON-SQ EPI CELLS URNS QL MICRO: ABNORMAL /HPF
NRBC BLD AUTO-RTO: 0 /100 WBCS
PH UR STRIP.AUTO: 6 [PH]
PLATELET # BLD AUTO: 304 THOUSANDS/UL (ref 149–390)
PMV BLD AUTO: 10.9 FL (ref 8.9–12.7)
POTASSIUM SERPL-SCNC: 3.3 MMOL/L (ref 3.5–5.3)
PROT SERPL-MCNC: 7.9 G/DL (ref 6.4–8.4)
PROT UR STRIP-MCNC: ABNORMAL MG/DL
RBC # BLD AUTO: 5.79 MILLION/UL (ref 3.81–5.12)
RBC #/AREA URNS AUTO: ABNORMAL /HPF
SODIUM SERPL-SCNC: 136 MMOL/L (ref 135–147)
SP GR UR STRIP.AUTO: 1.02 (ref 1–1.03)
UROBILINOGEN UR STRIP-ACNC: 3 MG/DL
WBC # BLD AUTO: 12.61 THOUSAND/UL (ref 4.31–10.16)
WBC #/AREA URNS AUTO: ABNORMAL /HPF

## 2023-03-08 RX ORDER — ACETAMINOPHEN 325 MG/1
975 TABLET ORAL ONCE
Status: COMPLETED | OUTPATIENT
Start: 2023-03-08 | End: 2023-03-08

## 2023-03-08 RX ORDER — SODIUM CHLORIDE, SODIUM LACTATE, POTASSIUM CHLORIDE, CALCIUM CHLORIDE 600; 310; 30; 20 MG/100ML; MG/100ML; MG/100ML; MG/100ML
125 INJECTION, SOLUTION INTRAVENOUS CONTINUOUS
Status: DISCONTINUED | OUTPATIENT
Start: 2023-03-08 | End: 2023-03-11

## 2023-03-08 RX ORDER — ONDANSETRON 2 MG/ML
4 INJECTION INTRAMUSCULAR; INTRAVENOUS EVERY 6 HOURS PRN
Status: DISCONTINUED | OUTPATIENT
Start: 2023-03-08 | End: 2023-03-12 | Stop reason: HOSPADM

## 2023-03-08 RX ORDER — HYDROMORPHONE HCL/PF 1 MG/ML
0.5 SYRINGE (ML) INJECTION ONCE
Status: COMPLETED | OUTPATIENT
Start: 2023-03-08 | End: 2023-03-08

## 2023-03-08 RX ORDER — MAGNESIUM HYDROXIDE/ALUMINUM HYDROXICE/SIMETHICONE 120; 1200; 1200 MG/30ML; MG/30ML; MG/30ML
30 SUSPENSION ORAL ONCE
Status: COMPLETED | OUTPATIENT
Start: 2023-03-08 | End: 2023-03-08

## 2023-03-08 RX ORDER — PANTOPRAZOLE SODIUM 40 MG/10ML
40 INJECTION, POWDER, LYOPHILIZED, FOR SOLUTION INTRAVENOUS ONCE
Status: COMPLETED | OUTPATIENT
Start: 2023-03-08 | End: 2023-03-08

## 2023-03-08 RX ORDER — FENTANYL CITRATE 50 UG/ML
25 INJECTION, SOLUTION INTRAMUSCULAR; INTRAVENOUS ONCE
Status: COMPLETED | OUTPATIENT
Start: 2023-03-08 | End: 2023-03-08

## 2023-03-08 RX ORDER — FENTANYL CITRATE 50 UG/ML
50 INJECTION, SOLUTION INTRAMUSCULAR; INTRAVENOUS ONCE
Status: COMPLETED | OUTPATIENT
Start: 2023-03-08 | End: 2023-03-08

## 2023-03-08 RX ORDER — METRONIDAZOLE 500 MG/100ML
500 INJECTION, SOLUTION INTRAVENOUS EVERY 8 HOURS
Status: DISCONTINUED | OUTPATIENT
Start: 2023-03-08 | End: 2023-03-12 | Stop reason: HOSPADM

## 2023-03-08 RX ORDER — SUCRALFATE 1 G/1
1 TABLET ORAL ONCE
Status: COMPLETED | OUTPATIENT
Start: 2023-03-08 | End: 2023-03-08

## 2023-03-08 RX ORDER — HEPARIN SODIUM 5000 [USP'U]/ML
5000 INJECTION, SOLUTION INTRAVENOUS; SUBCUTANEOUS EVERY 8 HOURS SCHEDULED
Status: DISCONTINUED | OUTPATIENT
Start: 2023-03-08 | End: 2023-03-12 | Stop reason: HOSPADM

## 2023-03-08 RX ORDER — ONDANSETRON 2 MG/ML
4 INJECTION INTRAMUSCULAR; INTRAVENOUS ONCE
Status: COMPLETED | OUTPATIENT
Start: 2023-03-08 | End: 2023-03-08

## 2023-03-08 RX ORDER — POTASSIUM CHLORIDE 14.9 MG/ML
20 INJECTION INTRAVENOUS
Status: COMPLETED | OUTPATIENT
Start: 2023-03-08 | End: 2023-03-09

## 2023-03-08 RX ORDER — NICOTINE 21 MG/24HR
1 PATCH, TRANSDERMAL 24 HOURS TRANSDERMAL DAILY
Status: DISCONTINUED | OUTPATIENT
Start: 2023-03-09 | End: 2023-03-12 | Stop reason: HOSPADM

## 2023-03-08 RX ADMIN — IOHEXOL 35 ML: 300 INJECTION, SOLUTION INTRAVENOUS at 16:20

## 2023-03-08 RX ADMIN — IOHEXOL 100 ML: 350 INJECTION, SOLUTION INTRAVENOUS at 16:19

## 2023-03-08 RX ADMIN — METRONIDAZOLE 500 MG: 500 INJECTION, SOLUTION INTRAVENOUS at 22:28

## 2023-03-08 RX ADMIN — HYDROMORPHONE HYDROCHLORIDE 0.5 MG: 1 INJECTION, SOLUTION INTRAMUSCULAR; INTRAVENOUS; SUBCUTANEOUS at 20:06

## 2023-03-08 RX ADMIN — FENTANYL CITRATE 50 MCG: 50 INJECTION INTRAMUSCULAR; INTRAVENOUS at 17:50

## 2023-03-08 RX ADMIN — PANTOPRAZOLE SODIUM 40 MG: 40 INJECTION, POWDER, FOR SOLUTION INTRAVENOUS at 12:10

## 2023-03-08 RX ADMIN — FENTANYL CITRATE 25 MCG: 50 INJECTION INTRAMUSCULAR; INTRAVENOUS at 13:13

## 2023-03-08 RX ADMIN — ONDANSETRON 4 MG: 2 INJECTION INTRAMUSCULAR; INTRAVENOUS at 15:56

## 2023-03-08 RX ADMIN — POTASSIUM CHLORIDE 20 MEQ: 14.9 INJECTION, SOLUTION INTRAVENOUS at 23:27

## 2023-03-08 RX ADMIN — SUCRALFATE 1 G: 1 TABLET ORAL at 12:02

## 2023-03-08 RX ADMIN — SODIUM CHLORIDE 1000 ML: 0.9 INJECTION, SOLUTION INTRAVENOUS at 12:05

## 2023-03-08 RX ADMIN — ACETAMINOPHEN 975 MG: 325 TABLET ORAL at 12:02

## 2023-03-08 RX ADMIN — FENTANYL CITRATE 50 MCG: 50 INJECTION INTRAMUSCULAR; INTRAVENOUS at 14:36

## 2023-03-08 RX ADMIN — ALUMINUM HYDROXIDE, MAGNESIUM HYDROXIDE, AND DIMETHICONE 30 ML: 200; 20; 200 SUSPENSION ORAL at 12:07

## 2023-03-08 RX ADMIN — SODIUM CHLORIDE, SODIUM LACTATE, POTASSIUM CHLORIDE, AND CALCIUM CHLORIDE 125 ML/HR: .6; .31; .03; .02 INJECTION, SOLUTION INTRAVENOUS at 21:04

## 2023-03-08 NOTE — ED PROCEDURE NOTE
Procedure  POC Biliary US    Date/Time: 3/8/2023 2:16 PM  Performed by: America Krabbe, DO  Authorized by: America Krabbe, DO     Patient location:  ED  Performed by:  Resident  Other Assisting Provider: No    Procedure details:     Exam Type:  Diagnostic    Indications comment:  Abdominal pain    Assessment for:  Cholecystitis, cholelithiasis and mass    Views obtained: gallbladder (transverse and longitudinal), liver and portal triad      Image quality: limited diagnostic      Image availability:  Images available in PACS  Findings:     Cholelithiasis: not identified      Common bile duct:  Unable to visualize    Gallbladder wall:  Normal    Pericholecystic fluid: not identified      Sonographic Cruz's sign: negative      Polyps: not identified      Mass: not identified    Interpretation:     Biliary ultrasound impressions: normal gallbladder ultrasound                       America Krabbe, DO  03/08/23 1416

## 2023-03-08 NOTE — ED PROVIDER NOTES
History  Chief Complaint   Patient presents with   • Flank Pain     Pt presents to the ED with left sided abd pain  Pt reports gi apt 3/22  Reports has been dealing with ongoing GI problems the last 2 months and continues to just gradually get worse  Patient is a 46year old female with recent diagnosis of enterocolonic fistula that presents to the emergency department with 2-week history of worsening abdominal pain  She reports that 2 weeks ago she had a GI bug which caused her to have nausea and vomiting and ever since then her pain has become significantly worse  She reports having a CT scan 1 month ago where they identified the enterocolonic fistula and has been following up with specialist surgeon for this  She reports that the pain is becoming severe and she is only able to eat or drink very little every day because of it  She has not had a bowel movement in the last 4 days  She reports that she is passing gas  Patient reports taking Advil frequently for this and reports taking 5 pills of Advil this morning prior to arrival without any improvement of pain  She denies any chest pain, shortness of breath, or blood in her stool  She denies any urinary symptoms  She reports that she is post to take Metamucil every day but she occasionally misses it  She has not had any vomiting over the last few days but does report significant nausea  She reports pain is slightly worse after eating but also has increased pain if she waits too long between meals  She denies any recent falls or injuries  Prior to Admission Medications   Prescriptions Last Dose Informant Patient Reported? Taking?    Cholecalciferol 125 MCG (5000 UT) capsule   Yes No   Sig: Take 5,000 Units by mouth daily   amLODIPine (NORVASC) 10 mg tablet   Yes No   Sig: Take 10 mg by mouth daily   amLODIPine (NORVASC) 2 5 mg tablet   No No   Sig: Take 1 tablet by mouth daily for 20 days   Patient taking differently: Take 5 mg by mouth daily   hydrochlorothiazide (HYDRODIURIL) 25 mg tablet   Yes No   Sig: Take 25 mg by mouth daily      Facility-Administered Medications: None       Past Medical History:   Diagnosis Date   • Diverticulitis    • Diverticulosis    • Fibroids    • Hypertension        Past Surgical History:   Procedure Laterality Date   • TUBAL LIGATION  1991       History reviewed  No pertinent family history  I have reviewed and agree with the history as documented  E-Cigarette/Vaping   • E-Cigarette Use Never User      E-Cigarette/Vaping Substances     Social History     Tobacco Use   • Smoking status: Every Day     Types: Cigars   • Smokeless tobacco: Current   Vaping Use   • Vaping Use: Never used   Substance Use Topics   • Alcohol use: Yes     Alcohol/week: 3 0 standard drinks     Types: 3 Shots of liquor per week     Comment: socially   • Drug use: No        Review of Systems   Constitutional: Positive for appetite change (decreased)  Negative for chills and fever  HENT: Negative for congestion, ear pain and sore throat  Eyes: Negative for pain and visual disturbance  Respiratory: Negative for cough and shortness of breath  Cardiovascular: Negative for chest pain and palpitations  Gastrointestinal: Positive for abdominal pain, constipation and nausea  Negative for blood in stool, diarrhea and vomiting  Genitourinary: Negative for dysuria and hematuria  Musculoskeletal: Negative for arthralgias and back pain  Skin: Negative for color change and rash  Neurological: Negative for dizziness, seizures, syncope, light-headedness and headaches  All other systems reviewed and are negative        Physical Exam  ED Triage Vitals   Temperature Pulse Respirations Blood Pressure SpO2   03/08/23 1101 03/08/23 1101 03/08/23 1101 03/08/23 1101 03/08/23 1101   97 9 °F (36 6 °C) 90 16 135/91 95 %      Temp Source Heart Rate Source Patient Position - Orthostatic VS BP Location FiO2 (%)   03/08/23 1101 03/08/23 1101 03/08/23 1101 03/08/23 1101 --   Oral Monitor Sitting Right arm       Pain Score       03/08/23 1436       8             Orthostatic Vital Signs  Vitals:    03/08/23 1437 03/08/23 1750 03/08/23 1830 03/08/23 2015   BP: 157/96 167/89 130/83 146/89   Pulse: 99 86 95 98   Patient Position - Orthostatic VS: Sitting   Lying       Physical Exam  Vitals and nursing note reviewed  Constitutional:       General: She is not in acute distress  Appearance: Normal appearance  She is well-developed  She is not ill-appearing  HENT:      Head: Normocephalic and atraumatic  Right Ear: External ear normal       Left Ear: External ear normal       Mouth/Throat:      Pharynx: Oropharynx is clear  No oropharyngeal exudate or posterior oropharyngeal erythema  Eyes:      General:         Right eye: No discharge  Left eye: No discharge  Extraocular Movements: Extraocular movements intact  Conjunctiva/sclera: Conjunctivae normal    Cardiovascular:      Rate and Rhythm: Normal rate and regular rhythm  Pulses: Normal pulses  Heart sounds: Normal heart sounds  No murmur heard  Pulmonary:      Effort: Pulmonary effort is normal  No respiratory distress  Breath sounds: Normal breath sounds  No wheezing, rhonchi or rales  Abdominal:      General: Abdomen is flat  Palpations: Abdomen is soft  Tenderness: There is abdominal tenderness  There is no guarding or rebound  Musculoskeletal:         General: No swelling or deformity  Normal range of motion  Cervical back: Normal range of motion and neck supple  No tenderness  Skin:     General: Skin is warm and dry  Capillary Refill: Capillary refill takes less than 2 seconds  Neurological:      Mental Status: She is alert           ED Medications  Medications   lactated ringers infusion (has no administration in time range)   nicotine (NICODERM CQ) 14 mg/24hr TD 24 hr patch 1 patch (has no administration in time range)   [facility]ron [facility]) injection 4 mg (has no administration in time range)   heparin (porcine) subcutaneous injection 5,000 Units (has no administration in time range)   ceftriaxone (ROCEPHIN) 1 g/50 mL in dextrose IVPB (has no administration in time range)   metroNIDAZOLE (FLAGYL) IVPB (premix) 500 mg 100 mL (has no administration in time range)   potassium chloride 20 mEq IVPB (premix) (has no administration in time range)   acetaminophen (TYLENOL) tablet 975 mg (975 mg Oral Given 3/8/23 1202)   sodium chloride 0 9 % bolus 1,000 mL (0 mL Intravenous Stopped 3/8/23 1436)   sucralfate (CARAFATE) tablet 1 g (1 g Oral Given 3/8/23 1202)   aluminum-magnesium hydroxide-simethicone (MYLANTA) oral suspension 30 mL (30 mL Oral Given 3/8/23 1207)   pantoprazole (PROTONIX) injection 40 mg (40 mg Intravenous Given 3/8/23 1210)   fentanyl citrate (PF) 100 MCG/2ML 25 mcg (25 mcg Intravenous Given 3/8/23 1313)   fentanyl citrate (PF) 100 MCG/2ML 50 mcg (50 mcg Intravenous Given 3/8/23 1436)   ondansetron (ZOFRAN) injection 4 mg (4 mg Intravenous Given 3/8/23 1556)   iohexol (OMNIPAQUE) 350 MG/ML injection (SINGLE-DOSE) 100 mL (100 mL Intravenous Given 3/8/23 1619)   iohexol (OMNIPAQUE) 300 mg/mL injection 35 mL (35 mL Oral Given 3/8/23 1620)   fentanyl citrate (PF) 100 MCG/2ML 50 mcg (50 mcg Intravenous Given 3/8/23 1750)   HYDROmorphone (DILAUDID) injection 0 5 mg (0 5 mg Intravenous Given 3/8/23 2006)       Diagnostic Studies  Results Reviewed     Procedure Component Value Units Date/Time    Lipase [[id number]]  (Abnormal) Collected: 03/08/23 1201    Lab Status: Final result Specimen: Blood from Arm, Right Updated: 03/08/23 1249     Lipase <6 u/L     CMP [[id number]]  (Abnormal) Collected: 03/08/23 1201    Lab Status: Final result Specimen: Blood from Arm, Right Updated: 03/08/23 1249     Sodium 136 mmol/L      Potassium 3 3 mmol/L      Chloride 101 mmol/L      CO2 26 mmol/L      ANION GAP 9 mmol/L      BUN 11 mg/dL      Creatinine 0 67 mg/dL      Glucose 94 mg/dL      Calcium 9 4 mg/dL      AST 19 U/L      ALT 21 U/L      Alkaline Phosphatase 80 U/L      Total Protein 7 9 g/dL      Albumin 4 1 g/dL      Total Bilirubin 0 89 mg/dL      eGFR 101 ml/min/1 73sq m     Narrative:      [place] guidelines for Chronic Kidney Disease (CKD):   •  Stage 1 with normal or high GFR (GFR > 90 mL/min/1 73 square meters)  •  Stage 2 Mild CKD (GFR = 60-89 mL/min/1 73 square meters)  •  Stage 3A Moderate CKD (GFR = 45-59 mL/min/1 73 square meters)  •  Stage 3B Moderate CKD (GFR = 30-44 mL/min/1 73 square meters)  •  Stage 4 Severe CKD (GFR = 15-29 mL/min/1 73 square meters)  •  Stage 5 End Stage CKD (GFR <15 mL/min/1 73 square meters)  Note: GFR calculation is accurate only with a steady state creatinine    Magnesium [[id number]]  (Normal) Collected: 03/08/23 1201    Lab Status: Final result Specimen: Blood from Arm, Right Updated: 03/08/23 1249     Magnesium 2 1 mg/dL     Urine Microscopic [[id number]]  (Abnormal) Collected: 03/08/23 1210    Lab Status: Final result Specimen: Urine, Clean Catch Updated: 03/08/23 1244     RBC, UA None Seen /hpf      WBC, UA 20-30 /hpf      Epithelial Cells Moderate /hpf      Bacteria, UA Occasional /hpf      Hyaline Casts, UA 3-5 /lpf     Urine culture [[id number]] Collected: 03/08/23 1210    Lab Status:  In process Specimen: Urine, Clean Catch Updated: 03/08/23 1244    UA w Reflex to Microscopic w Reflex to Culture [[id number]]  (Abnormal) Collected: 03/08/23 1210    Lab Status: Final result Specimen: Urine, Clean Catch Updated: 03/08/23 1233     Color, UA Yellow     Clarity, UA Turbid     Specific Corinne, UA 1 018     pH, UA 6 0     Leukocytes, UA Moderate     Nitrite, UA Negative     Protein, UA Trace mg/dl      Glucose, UA Negative mg/dl      Ketones, UA Negative mg/dl      Urobilinogen, UA 3 0 mg/dl      Bilirubin, UA Negative     Occult Blood, UA Negative    CBC and differential [[id number]]  (Abnormal) Collected: 03/08/23 1201    Lab Status: Final result Specimen: Blood from Arm, Right Updated: 03/08/23 1231     WBC 12 61 Thousand/uL      RBC 5 79 Million/uL      Hemoglobin 16 6 g/dL      Hematocrit 50 9 %      MCV 88 fL      MCH 28 7 pg      MCHC 32 6 g/dL      RDW 13 3 %      MPV 10 9 fL      Platelets 313 Thousands/uL      nRBC 0 /100 WBCs      Neutrophils Relative 75 %      Immat GRANS % 1 %      Lymphocytes Relative 14 %      Monocytes Relative 8 %      Eosinophils Relative 1 %      Basophils Relative 1 %      Neutrophils Absolute 9 59 Thousands/µL      Immature Grans Absolute 0 07 Thousand/uL      Lymphocytes Absolute 1 81 Thousands/µL      Monocytes Absolute 0 97 Thousand/µL      Eosinophils Absolute 0 10 Thousand/µL      Basophils Absolute 0 07 Thousands/µL                  CT abdomen pelvis with contrast   Final Result by Ingrid Islas MD (03/08 1725)      Sigmoid diverticulosis with wall thickening  There is now partial obstruction of the colon with dilatation throughout that appears to be at the site of the previously described enterocolonic fistula image 2/121  Surgical consultation suggested  Stable hepatic steatosis  Stable anterior uterine fibroid  Workstation performed: [id number]               Procedures  Procedures      ED Course  ED Course as of 03/08/23 2036   Wed Mar 08, 2023   1543 Patient had 1 episode of vomiting after a full cup of oral contrast   Will give Zofran and complete oral contrast prior to CT scan  SBIRT 22yo+    Flowsheet Row Most Recent Value   SBIRT (23 yo +)    In order to provide better care to our patients, we are screening all of our patients for alcohol and drug use  Would it be okay to ask you these screening questions?  Unable to answer at this time Filed at: 03/08/2023 1608                Medical Decision Making  52F with recent diagnosis of enterocolonic fistula presents to the emergency department with 2 weeks of worsening abdominal pain and constipation  Has not had a bowel movement last 4 days  She reports generalized abdominal tenderness  On laboratory evaluation patient has mild hypokalemia and mildly increased white blood cell count of 12 6  Laboratory evaluation is otherwise within normal limits  Patient has CT scan of the abdomen and pelvis with contrast which reveals sigmoid diverticulosis with wall thickening and a new now partial obstruction of the colon with dilatation throughout that appears to be at the site of the previously described enterocolonic fistula  In the presence of these findings on CT scan, surgical resident is contacted to come and evaluate the patient and feel the patient is appropriate under the care of of colorectal surgery  She is admitted  Amount and/or Complexity of Data Reviewed  Labs: ordered  Radiology: ordered  Risk  OTC drugs  Prescription drug management  Decision regarding hospitalization  Disposition  Final diagnoses: Bowel obstruction ([address] )   Abdominal pain     Time reflects when diagnosis was documented in both MDM as applicable and the Disposition within this note     Time User Action Codes Description Comment    3/8/2023  8:01 PM Marychuy Jo [[id number]] Bowel obstruction ([address] )     3/8/2023  8:02 PM Marychuy Jo [[id number]] Abdominal pain       ED Disposition     ED Disposition   Admit    Condition   Stable    Date/Time   Wed Mar 8, 2023  8:01 PM    Comment   Case was discussed with Dr Phylicia Lundberg and the patient's admission status was agreed to be Admission Status: inpatient status to the service of Dr Amanda Waters  Follow-up Information    None         Patient's Medications   Discharge Prescriptions    No medications on file     No discharge procedures on file  PDMP Review     None           ED Provider  Attending physically available and evaluated Melina Lambert  MARIANO managed the patient along with the ED Attending      Electronically Signed by         Elsa Perez,   03/08/23 2036

## 2023-03-08 NOTE — ED ATTENDING ATTESTATION
3/8/2023  Josh QUINTANA DO, saw and evaluated the patient  I have discussed the patient with the resident/non-physician practitioner and agree with the resident's/non-physician practitioner's findings, Plan of Care, and MDM as documented in the resident's/non-physician practitioner's note, except where noted  All available labs and Radiology studies were reviewed  I was present for key portions of any procedure(s) performed by the resident/non-physician practitioner and I was immediately available to provide assistance  At this point I agree with the current assessment done in the Emergency Department  I have conducted an independent evaluation of this patient a history and physical is as follows:    Patient is a 41-year-old female with a history of sigmoid diverticulitis and enterocolonic fistula who presents with abdominal pain  Patient states that she has had abdominal pain for a couple of months, but it is getting progressively worse  She describes primarily left upper quadrant abdominal pain  She describes decreased p o  intake today  She admits to nausea, but denies vomiting  Last bowel movement was about 4 days ago  She has been taking Metamucil since then  She denies abdominal distention  She denies fevers or chills  On exam, patient is in no acute distress  Heart is regular rate and rhythm  Breath sounds normal   Abdomen is soft, mildly tender to palpation in the left upper quadrant  No rebound or guarding  CT shows evidence of small bowel obstruction  General surgery was consulted and patient will be admitted to colorectal surgery service  Portions of the above record have been created with voice recognition software  Occasional wrong word or "sound alike" substitutions may have occurred due to the inherent limitations of voice recognition software  Read the chart carefully and recognize, using context, where substitutions may have occurred        ED Course Critical Care Time  Procedures

## 2023-03-09 ENCOUNTER — APPOINTMENT (INPATIENT)
Dept: RADIOLOGY | Facility: HOSPITAL | Age: 53
End: 2023-03-09

## 2023-03-09 LAB
ANION GAP SERPL CALCULATED.3IONS-SCNC: 9 MMOL/L (ref 4–13)
BACTERIA UR CULT: NORMAL
BASOPHILS # BLD AUTO: 0.04 THOUSANDS/ÂΜL (ref 0–0.1)
BASOPHILS NFR BLD AUTO: 0 % (ref 0–1)
BUN SERPL-MCNC: 8 MG/DL (ref 5–25)
CALCIUM SERPL-MCNC: 8.2 MG/DL (ref 8.4–10.2)
CHLORIDE SERPL-SCNC: 104 MMOL/L (ref 96–108)
CO2 SERPL-SCNC: 22 MMOL/L (ref 21–32)
CREAT SERPL-MCNC: 0.54 MG/DL (ref 0.6–1.3)
EOSINOPHIL # BLD AUTO: 0.02 THOUSAND/ÂΜL (ref 0–0.61)
EOSINOPHIL NFR BLD AUTO: 0 % (ref 0–6)
ERYTHROCYTE [DISTWIDTH] IN BLOOD BY AUTOMATED COUNT: 13.4 % (ref 11.6–15.1)
GFR SERPL CREATININE-BSD FRML MDRD: 108 ML/MIN/1.73SQ M
GLUCOSE SERPL-MCNC: 107 MG/DL (ref 65–140)
HCT VFR BLD AUTO: 45 % (ref 34.8–46.1)
HGB BLD-MCNC: 15 G/DL (ref 11.5–15.4)
IMM GRANULOCYTES # BLD AUTO: 0.05 THOUSAND/UL (ref 0–0.2)
IMM GRANULOCYTES NFR BLD AUTO: 1 % (ref 0–2)
LYMPHOCYTES # BLD AUTO: 1.14 THOUSANDS/ÂΜL (ref 0.6–4.47)
LYMPHOCYTES NFR BLD AUTO: 13 % (ref 14–44)
MAGNESIUM SERPL-MCNC: 2.1 MG/DL (ref 1.9–2.7)
MCH RBC QN AUTO: 28.8 PG (ref 26.8–34.3)
MCHC RBC AUTO-ENTMCNC: 33.3 G/DL (ref 31.4–37.4)
MCV RBC AUTO: 87 FL (ref 82–98)
MONOCYTES # BLD AUTO: 0.78 THOUSAND/ÂΜL (ref 0.17–1.22)
MONOCYTES NFR BLD AUTO: 9 % (ref 4–12)
NEUTROPHILS # BLD AUTO: 6.93 THOUSANDS/ÂΜL (ref 1.85–7.62)
NEUTS SEG NFR BLD AUTO: 77 % (ref 43–75)
NRBC BLD AUTO-RTO: 0 /100 WBCS
PHOSPHATE SERPL-MCNC: 3.5 MG/DL (ref 2.7–4.5)
PLATELET # BLD AUTO: 251 THOUSANDS/UL (ref 149–390)
PMV BLD AUTO: 10.7 FL (ref 8.9–12.7)
POTASSIUM SERPL-SCNC: 3.1 MMOL/L (ref 3.5–5.3)
RBC # BLD AUTO: 5.2 MILLION/UL (ref 3.81–5.12)
SODIUM SERPL-SCNC: 135 MMOL/L (ref 135–147)
WBC # BLD AUTO: 8.96 THOUSAND/UL (ref 4.31–10.16)

## 2023-03-09 RX ORDER — HYDROMORPHONE HCL/PF 1 MG/ML
0.5 SYRINGE (ML) INJECTION
Status: DISCONTINUED | OUTPATIENT
Start: 2023-03-09 | End: 2023-03-11

## 2023-03-09 RX ORDER — HYDROMORPHONE HCL IN WATER/PF 6 MG/30 ML
0.2 PATIENT CONTROLLED ANALGESIA SYRINGE INTRAVENOUS
Status: DISCONTINUED | OUTPATIENT
Start: 2023-03-09 | End: 2023-03-11

## 2023-03-09 RX ORDER — POTASSIUM CHLORIDE 14.9 MG/ML
20 INJECTION INTRAVENOUS
Status: COMPLETED | OUTPATIENT
Start: 2023-03-09 | End: 2023-03-09

## 2023-03-09 RX ORDER — PANTOPRAZOLE SODIUM 40 MG/10ML
40 INJECTION, POWDER, LYOPHILIZED, FOR SOLUTION INTRAVENOUS
Status: DISCONTINUED | OUTPATIENT
Start: 2023-03-09 | End: 2023-03-12 | Stop reason: HOSPADM

## 2023-03-09 RX ADMIN — PANTOPRAZOLE SODIUM 40 MG: 40 INJECTION, POWDER, FOR SOLUTION INTRAVENOUS at 09:13

## 2023-03-09 RX ADMIN — CEFTRIAXONE 1000 MG: 2 INJECTION, POWDER, FOR SOLUTION INTRAMUSCULAR; INTRAVENOUS at 03:29

## 2023-03-09 RX ADMIN — POTASSIUM CHLORIDE 20 MEQ: 14.9 INJECTION, SOLUTION INTRAVENOUS at 07:25

## 2023-03-09 RX ADMIN — HYDROMORPHONE HYDROCHLORIDE 0.5 MG: 1 INJECTION, SOLUTION INTRAMUSCULAR; INTRAVENOUS; SUBCUTANEOUS at 06:29

## 2023-03-09 RX ADMIN — HYDROMORPHONE HYDROCHLORIDE 0.2 MG: 0.2 INJECTION, SOLUTION INTRAMUSCULAR; INTRAVENOUS; SUBCUTANEOUS at 08:44

## 2023-03-09 RX ADMIN — POTASSIUM CHLORIDE 20 MEQ: 14.9 INJECTION, SOLUTION INTRAVENOUS at 11:44

## 2023-03-09 RX ADMIN — CEFTRIAXONE 1000 MG: 2 INJECTION, POWDER, FOR SOLUTION INTRAMUSCULAR; INTRAVENOUS at 23:06

## 2023-03-09 RX ADMIN — METRONIDAZOLE 500 MG: 500 INJECTION, SOLUTION INTRAVENOUS at 20:06

## 2023-03-09 RX ADMIN — POTASSIUM CHLORIDE 20 MEQ: 14.9 INJECTION, SOLUTION INTRAVENOUS at 01:27

## 2023-03-09 RX ADMIN — METRONIDAZOLE 500 MG: 500 INJECTION, SOLUTION INTRAVENOUS at 12:15

## 2023-03-09 RX ADMIN — SODIUM CHLORIDE, SODIUM LACTATE, POTASSIUM CHLORIDE, AND CALCIUM CHLORIDE 125 ML/HR: .6; .31; .03; .02 INJECTION, SOLUTION INTRAVENOUS at 20:06

## 2023-03-09 RX ADMIN — ONDANSETRON 4 MG: 2 INJECTION INTRAMUSCULAR; INTRAVENOUS at 12:23

## 2023-03-09 RX ADMIN — METRONIDAZOLE 500 MG: 500 INJECTION, SOLUTION INTRAVENOUS at 04:57

## 2023-03-09 RX ADMIN — POTASSIUM CHLORIDE 20 MEQ: 14.9 INJECTION, SOLUTION INTRAVENOUS at 09:13

## 2023-03-09 RX ADMIN — POTASSIUM CHLORIDE 20 MEQ: 14.9 INJECTION, SOLUTION INTRAVENOUS at 13:24

## 2023-03-09 NOTE — PLAN OF CARE
Problem: Potential for Falls  Goal: Patient will remain free of falls  Description: INTERVENTIONS:  - Educate patient/family on patient safety including physical limitations  - Instruct patient to call for assistance with activity   - Consult OT/PT to assist with strengthening/mobility   - Keep Call bell within reach  - Keep bed low and locked with side rails adjusted as appropriate  - Keep care items and personal belongings within reach  - Initiate and maintain comfort rounds  - Make Fall Risk Sign visible to staff  - Obtain necessary fall risk management equipment  - Apply yellow socks and bracelet for high fall risk patients  - Consider moving patient to room near nurses station  Outcome: Progressing    Problem: Nutrition/Hydration-ADULT  Goal: Nutrient/Hydration intake appropriate for improving, restoring or maintaining nutritional needs  Description: Monitor and assess patient's nutrition/hydration status for malnutrition  Collaborate with interdisciplinary team and initiate plan and interventions as ordered  Monitor patient's weight and dietary intake as ordered or per policy  Utilize nutrition screening tool and intervene as necessary  Determine patient's food preferences and provide high-protein, high-caloric foods as appropriate       INTERVENTIONS:  - Monitor oral intake, urinary output, labs, and treatment plans  - Assess nutrition and hydration status and recommend course of action  - Evaluate amount of meals eaten  - Assist patient with eating if necessary   - Allow adequate time for meals  - Recommend/ encourage appropriate diets, oral nutritional supplements, and vitamin/mineral supplements  - Order, calculate, and assess calorie counts as needed  - Recommend, monitor, and adjust tube feedings and TPN/PPN based on assessed needs  - Assess need for intravenous fluids  - Provide specific nutrition/hydration education as appropriate  - Include patient/family/caregiver in decisions related to nutrition  Outcome: Progressing     Problem: PAIN - ADULT  Goal: Verbalizes/displays adequate comfort level or baseline comfort level  Description: Interventions:  - Encourage patient to monitor pain and request assistance  - Assess pain using appropriate pain scale  - Administer analgesics based on type and severity of pain and evaluate response  - Implement non-pharmacological measures as appropriate and evaluate response  - Consider cultural and social influences on pain and pain management  - Notify physician/advanced practitioner if interventions unsuccessful or patient reports new pain  Outcome: Progressing     Problem: SAFETY ADULT  Goal: Maintain or return to baseline ADL function  Description: INTERVENTIONS:  -  Assess patient's ability to carry out ADLs; assess patient's baseline for ADL function and identify physical deficits which impact ability to perform ADLs (bathing, care of mouth/teeth, toileting, grooming, dressing, etc )  - Assess/evaluate cause of self-care deficits   - Assess range of motion  - Assess patient's mobility; develop plan if impaired  - Assess patient's need for assistive devices and provide as appropriate  - Encourage maximum independence but intervene and supervise when necessary  - Involve family in performance of ADLs  - Assess for home care needs following discharge   - Consider OT consult to assist with ADL evaluation and planning for discharge  - Provide patient education as appropriate  Outcome: Progressing

## 2023-03-09 NOTE — UTILIZATION REVIEW
Initial Clinical Review    Admission: Date/Time/Statement:   Admission Orders (From admission, onward)     Ordered        03/08/23 2004  INPATIENT ADMISSION  Once            03/08/23 2011  Inpatient Admission  Once                      Orders Placed This Encounter   Procedures   • INPATIENT ADMISSION     Standing Status:   Standing     Number of Occurrences:   1     Order Specific Question:   Level of Care     Answer:   Med Surg [16]     Order Specific Question:   Estimated length of stay     Answer:   More than 2 Midnights     Order Specific Question:   Certification     Answer:   I certify that inpatient services are medically necessary for this patient for a duration of greater than two midnights  See H&P and MD Progress Notes for additional information about the patient's course of treatment  • Inpatient Admission     Standing Status:   Standing     Number of Occurrences:   1     Order Specific Question:   Level of Care     Answer:   Med Surg [16]     Order Specific Question:   Bed request comments     Answer:   prefer [facility] 4th [facility] (or any floor on [place]) for close monitoring please, possible need for surgery, thank you!!     Order Specific Question:   Estimated length of stay     Answer:   More than 2 Midnights     Order Specific Question:   Certification     Answer:   I certify that inpatient services are medically necessary for this patient for a duration of greater than two midnights  See H&P and MD Progress Notes for additional information about the patient's course of treatment  ED Arrival Information     Expected   -    Arrival   3/8/2023 10:35    Acuity   Urgent            Means of arrival   Walk-In    Escorted by   Self    Service   Surgery-General    Admission type   Emergency            Arrival complaint   flank pain           Chief Complaint   Patient presents with   • Flank Pain     Pt presents to the ED with left sided abd pain  Pt reports gi apt 3/22   Reports has been dealing with ongoing GI problems the last 2 months and continues to just gradually get worse  Initial Presentation: 46 y o  female with PMH of diverticulitis, HTN who presents with partial large bowel obstruction in setting of known history of stricture in sigmoid colon and enterocolonic fistulas per CT small bowel enterography on 2/6/2023  Colonoscopy august 2020 did not show evidence of the fistulas and stricture, but did show a tortuous colon with diverticulosis  Plan: Inpatient admission for evaluation and treatment of partial large bowel obstruction: NPO, NGT, IV fluids, start IV Rocephin and Flagyl, ambulate  Date: 3/9   Day 2:     Colorectal surgery: continue NPO, NGT, IV fluids, continue IV Rocephin and Flagyl, ambulate  ED Triage Vitals   Temperature Pulse Respirations Blood Pressure SpO2   03/08/23 1101 03/08/23 1101 03/08/23 1101 03/08/23 1101 03/08/23 1101   97 9 °F (36 6 °C) 90 16 135/91 95 %      Temp Source Heart Rate Source Patient Position - Orthostatic VS BP Location FiO2 (%)   03/08/23 1101 03/08/23 1101 03/08/23 1101 03/08/23 1101 --   Oral Monitor Sitting Right arm       Pain Score       03/08/23 1436       8          Wt Readings from Last 1 Encounters:   01/19/23 90 7 kg (200 lb)     Additional Vital Signs:     Date/Time Temp Pulse Resp BP MAP (mmHg) SpO2 O2 Device   03/09/23 08:07:47 99 4 °F (37 4 °C) 90 -- 147/93 111 88 % Abnormal  --   03/08/23 2144 99 5 °F (37 5 °C) 93 16 149/94 112 -- --   03/08/23 2015 -- 98 16 146/89 112 94 % None (Room air)   03/08/23 1830 -- 95 16 130/83 102 94 % None (Room air)   03/08/23 1750 -- 86 16 167/89 118 95 % None (Room air)   03/08/23 1437 -- 99 16 157/96 122 96 % None (Room air)   03/08/23 1213 -- 89 16 145/95 115 99 % None (Room air)     Pertinent Labs/Diagnostic Test Results:   CT abdomen pelvis with contrast   Final Result by Venice Hwang MD (03/08 1725)      Sigmoid diverticulosis with wall thickening    There is now partial obstruction of the colon with dilatation throughout that appears to be at the site of the previously described enterocolonic fistula image 2/121  Surgical consultation suggested  Stable hepatic steatosis  Stable anterior uterine fibroid              Workstation performed: [id number]               Results from last 7 days   Lab Units 03/09/23  0525 03/08/23  1201   WBC Thousand/uL 8 96 12 61*   HEMOGLOBIN g/dL 15 0 16 6*   HEMATOCRIT % 45 0 50 9*   PLATELETS Thousands/uL 251 304   NEUTROS ABS Thousands/µL 6 93 9 59*         Results from last 7 days   Lab Units 03/09/23  0525 03/08/23  1201   SODIUM mmol/L 135 136   POTASSIUM mmol/L 3 1* 3 3*   CHLORIDE mmol/L 104 101   CO2 mmol/L 22 26   ANION GAP mmol/L 9 9   BUN mg/dL 8 11   CREATININE mg/dL 0 54* 0 67   EGFR ml/min/1 73sq m 108 101   CALCIUM mg/dL 8 2* 9 4   MAGNESIUM mg/dL 2 1 2 1   PHOSPHORUS mg/dL 3 5  --      Results from last 7 days   Lab Units 03/08/23  1201   AST U/L 19   ALT U/L 21   ALK PHOS U/L 80   TOTAL PROTEIN g/dL 7 9   ALBUMIN g/dL 4 1   TOTAL BILIRUBIN mg/dL 0 89         Results from last 7 days   Lab Units 03/09/23  0525 03/08/23  1201   GLUCOSE RANDOM mg/dL 107 94           Results from last 7 days   Lab Units 03/08/23  1201   LIPASE u/L <6*           Results from last 7 days   Lab Units 03/08/23  1210   CLARITY UA  Turbid   COLOR UA  Yellow   SPEC GRAV UA  1 018   PH UA  6 0   GLUCOSE UA mg/dl Negative   KETONES UA mg/dl Negative   BLOOD UA  Negative   PROTEIN UA mg/dl Trace*   NITRITE UA  Negative   BILIRUBIN UA  Negative   UROBILINOGEN UA (BE) mg/dl 3 0*   LEUKOCYTES UA  Moderate*   WBC UA /hpf 20-30*   RBC UA /hpf None Seen   BACTERIA UA /hpf Occasional   EPITHELIAL CELLS WET PREP /hpf Moderate*         ED Treatment:   Medication Administration from 03/08/2023 1035 to 03/08/2023 2134       Date/Time Order Dose Route Action     03/08/2023 1202 EST acetaminophen (TYLENOL) tablet 975 mg 975 mg Oral Given     03/08/2023 1205 EST sodium chloride 0 9 % bolus 1,000 mL 1,000 mL Intravenous New Bag     03/08/2023 1202 EST sucralfate (CARAFATE) tablet 1 g 1 g Oral Given     03/08/2023 1207 EST aluminum-magnesium hydroxide-simethicone (MYLANTA) oral suspension 30 mL 30 mL Oral Given     03/08/2023 1210 EST pantoprazole (PROTONIX) injection 40 mg 40 mg Intravenous Given     03/08/2023 1313 EST fentanyl citrate (PF) 100 MCG/2ML 25 mcg 25 mcg Intravenous Given     03/08/2023 1436 EST fentanyl citrate (PF) 100 MCG/2ML 50 mcg 50 mcg Intravenous Given     03/08/2023 1556 EST ondansetron (ZOFRAN) injection 4 mg 4 mg Intravenous Given     03/08/2023 1619 EST iohexol (OMNIPAQUE) 350 MG/ML injection (SINGLE-DOSE) 100 mL 100 mL Intravenous Given     03/08/2023 1620 EST iohexol (OMNIPAQUE) 300 mg/mL injection 35 mL 35 mL Oral Given     03/08/2023 1750 EST fentanyl citrate (PF) 100 MCG/2ML 50 mcg 50 mcg Intravenous Given     03/08/2023 2006 EST HYDROmorphone (DILAUDID) injection 0 5 mg 0 5 mg Intravenous Given     03/08/2023 2104 EST lactated ringers infusion 125 mL/hr Intravenous New Bag        Past Medical History:   Diagnosis Date   • Diverticulitis    • Diverticulosis    • Fibroids    • Hypertension      Present on Admission:  **None**      Admitting Diagnosis: Bowel obstruction (HCC) [K56 609]  Abdominal pain [R10 9]  Flank pain [R10 9]  Age/Sex: 46 y o  female  Admission Orders:  Scheduled Medications:  cefTRIAXone, 1,000 mg, Intravenous, Q24H  heparin (porcine), 5,000 Units, Subcutaneous, Q8H DEMIAN  metroNIDAZOLE, 500 mg, Intravenous, Q8H  nicotine, 1 patch, Transdermal, Daily  pantoprazole, 40 mg, Intravenous, Q24H DEMIAN  potassium chloride, 20 mEq, Intravenous, Q2H      Continuous IV Infusions:  lactated ringers, 125 mL/hr, Intravenous, Continuous      PRN Meds:  HYDROmorphone, 0 5 mg, Intravenous, Q3H PRN  HYDROmorphone, 0 2 mg, Intravenous, Q3H PRN  ondansetron, 4 mg, Intravenous, Q6H PRN        None    Network Utilization Review Department  ATTENTION: Please call with any questions or concerns to [phone] and carefully listen to the prompts so that you are directed to the right person  All voicemails are confidential   [facility] all requests for admission clinical reviews, approved or denied determinations and any other requests to dedicated fax number below belonging to the campus where the patient is receiving treatment   List of dedicated fax numbers for the Facilities:  [address] DENIALS (Administrative/Medical Necessity) [phone]   [address] (Maternity/NICU/Pediatrics) [phone]   [address] [phone]   [place] [address] [phone]   [address] [place] [address] [facility] [place] [place] [address] [facility] [phone]   [facility] [address] 815 [facility] [phone]

## 2023-03-09 NOTE — PROGRESS NOTES
Progress Note - Colorectal Surgery   Albino Braxton 46 y o  female MRN: [id number]  Unit/Bed#: RA Encounter: [id number]    Assessment:  47yo F with partial large bowel obstruction in setting of known history of sigmoid colon stricture and enterocolonic fistulas    AVSS  WBC 8 96 (12 6)  NGT: 50 cc    Plan:  - NPO/NGT  - mIVF  - cont rocephin/flagyl  - monitor and replete electrolytes prn  - maintain NGT, monitor output and character  - OOB, ambulate, up in chair  - Please TigerText the surgery resident or AP role with any questions  Subjective/Objective   Subjective:   No acute events overnight  Still feeling nauseous this morning  Passing flatus, no BMs  Still having abd pain  Objective:     Blood pressure 149/94, pulse 93, temperature 99 5 °F (37 5 °C), temperature source Oral, resp  rate 16, height 4' 9" (1 448 m), SpO2 94 %  ,There is no height or weight on file to calculate BMI  Intake/Output Summary (Last 24 hours) at 3/8/2023 2118  Last data filed at 3/8/2023 2055  Gross per 24 hour   Intake 1000 ml   Output 50 ml   Net 950 ml       Invasive Devices     Peripheral Intravenous Line  Duration           Peripheral IV 03/08/23 Right Antecubital <1 day          Drain  Duration           NG/OG/Enteral Tube Nasogastric 16 Fr Right nare <1 day                Physical Exam:  General: No acute distress  Neuro: alert and oriented  HEENT: moist mucous membranes, NGT in place  CV: Well perfused, regular rate and rhythm  Lungs: Normal work of breathing, no increased respiratory effort  Abdomen: Soft, diffusely tender, distended     Extremities: No edema, clubbing or cyanosis  Skin: Warm, dry        Zofia Ward MD  General Surgery PGY1

## 2023-03-09 NOTE — H&P
Colorectal Surgery  History and Physical  Geno Wood 46 y o  female MRN: [id number]  Unit/Bed#: ED-31 Encounter: [id number]    Assessment:  47yo F with partial large bowel obstruction in setting of known history of stricture in sigmoid colon and enterocolonic fistulas per CT small bowel enterography on 2/6/2023  Colonoscopy august 2020 did not show evidence of the fistulas and stricture, but did show a tortuous colon with diverticulosis  Has been following up with gen surg outpatient, but given more complicated presentation and upcoming appointment with colorectal surgery, the patient will be admitted to colorectal service  PMHx diverticulitis and htn  PSHx tubal ligation 1991  AVSS  K 3 3  WBC 12 61    3/8 CT AP with IV/PO con: colonic distention seen throughout, possible stricture in sigmoid colon similar to that seen in previous studies, mild obstruction noted by the radiologist at the site of previously described enterocolonic fistulas      Plan:  - admit to colorectal surgery service  - NPO/NGT  - LR @125  - start rocephin/flagyl  - replete electrolytes prn  - maintain NGT  - please record I/O including NGT output  - OOB, ambulate, up in chair  - Please TigerText the surgery resident or AP role with any questions  History of Present Illness   HPI:  Geno Wood is a 46 y o  female who presents with nausea, vomiting, and abdominal pain  Started around 2/14 when she and her  both became sick with a "stomach bug " Her appetite hasn't fully recovered since that time, although her symptoms seemed to be slightly improving  Last BM was 4 days ago, but she has been passing flatus throughout the day today  Has been nauseous and vomited the oral contrast she was given in the ED  Of note, she was admitted end of December 2022 for acute diverticulitis and 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   She had obstructive symptoms at that time which improved with non-op management  Her symptoms were resolved at discharge, but started to return since 2/14  Colonoscopy august 2020 did not show evidence of the fistulas and stricture, but did show a tortuous colon with diverticulosis  Has been following up with gen surg outpatient, but given more complicated presentation and upcoming appointment with colorectal surgery, the patient will be admitted to colorectal service  PMHx diverticulitis and htn  PSHx tubal ligation 1991  Review of Systems   Constitutional: Positive for appetite change  Negative for chills and fever  HENT: Negative  Respiratory: Negative  Cardiovascular: Negative  Gastrointestinal: Positive for abdominal distention, abdominal pain, constipation, nausea and vomiting  Genitourinary: Negative  Musculoskeletal: Negative  Skin: Negative  Neurological: Negative  Psychiatric/Behavioral: Negative          Historical Information   Past Medical History:   Diagnosis Date   • Diverticulitis    • Diverticulosis    • Fibroids    • Hypertension      Past Surgical History:   Procedure Laterality Date   • TUBAL LIGATION  1991     Social History   Social History     Substance and Sexual Activity   Alcohol Use Yes   • Alcohol/week: 3 0 standard drinks   • Types: 3 Shots of liquor per week    Comment: socially     Social History     Substance and Sexual Activity   Drug Use No     Social History     Tobacco Use   Smoking Status Every Day   • Types: Cigars   Smokeless Tobacco Current     Family History: non-contributory    Meds/Allergies   all medications and allergies reviewed  No Known Allergies    Objective   First Vitals:   Blood Pressure: 135/91 (03/08/23 1101)  Pulse: 90 (03/08/23 1101)  Temperature: 97 9 °F (36 6 °C) (03/08/23 1101)  Temp Source: Oral (03/08/23 1101)  Respirations: 16 (03/08/23 1101)  SpO2: 95 % (03/08/23 1101)    Current Vitals:   Blood Pressure: 130/83 (03/08/23 1830)  Pulse: 95 (03/08/23 1830)  Temperature: 97 9 °F (36 6 °C) (03/08/23 1101)  Temp Source: Oral (03/08/23 1101)  Respirations: 16 (03/08/23 1830)  SpO2: 94 % (03/08/23 1830)      Intake/Output Summary (Last 24 hours) at 3/8/2023 1917  Last data filed at 3/8/2023 1436  Gross per 24 hour   Intake 1000 ml   Output --   Net 1000 ml       Invasive Devices     Peripheral Intravenous Line  Duration           Peripheral IV 03/08/23 Right Antecubital <1 day                Physical Exam:  General: No acute distress  Neuro: alert and oriented  HEENT: moist mucous membranes  CV: Well perfused, regular rate and rhythm  Lungs: Normal work of breathing, no increased respiratory effort  Abdomen: Soft, minimal diffuse tenderness, distended  Extremities: No edema, clubbing or cyanosis  Skin: Warm, dry      Lab Results:   CBC:   Lab Results   Component Value Date    WBC 12 61 (H) 03/08/2023    HGB 16 6 (H) 03/08/2023    HCT 50 9 (H) 03/08/2023    MCV 88 03/08/2023     03/08/2023    MCH 28 7 03/08/2023    MCHC 32 6 03/08/2023    RDW 13 3 03/08/2023    MPV 10 9 03/08/2023    NRBC 0 03/08/2023   , CMP:   Lab Results   Component Value Date    SODIUM 136 03/08/2023    K 3 3 (L) 03/08/2023     03/08/2023    CO2 26 03/08/2023    BUN 11 03/08/2023    CREATININE 0 67 03/08/2023    CALCIUM 9 4 03/08/2023    AST 19 03/08/2023    ALT 21 03/08/2023    ALKPHOS 80 03/08/2023    EGFR 101 03/08/2023     Imaging: I have personally reviewed pertinent films in PACS  EKG, Pathology, and Other Studies: I have personally reviewed pertinent films in PACS    Code Status: Prior  Advance Directive and Living Will:      Power of :    POLST:      Counseling / Coordination of Care  Total floor / unit time spent today 20 minutes  This involved direct patient contact where I performed a full history and physical, reviewed previous records, and reviewed laboratory and other diagnostic studies   Greater than 50% of total time was spent with the patient and / or family counseling and / or coordination of sylvie Cano MD  General Surgery PGY1  3/8/2023

## 2023-03-10 ENCOUNTER — APPOINTMENT (INPATIENT)
Dept: RADIOLOGY | Facility: HOSPITAL | Age: 53
End: 2023-03-10

## 2023-03-10 LAB
ANION GAP SERPL CALCULATED.3IONS-SCNC: 8 MMOL/L (ref 4–13)
BUN SERPL-MCNC: 8 MG/DL (ref 5–25)
CALCIUM SERPL-MCNC: 8.4 MG/DL (ref 8.4–10.2)
CHLORIDE SERPL-SCNC: 105 MMOL/L (ref 96–108)
CO2 SERPL-SCNC: 24 MMOL/L (ref 21–32)
CREAT SERPL-MCNC: 0.52 MG/DL (ref 0.6–1.3)
ERYTHROCYTE [DISTWIDTH] IN BLOOD BY AUTOMATED COUNT: 13.3 % (ref 11.6–15.1)
GFR SERPL CREATININE-BSD FRML MDRD: 110 ML/MIN/1.73SQ M
GLUCOSE SERPL-MCNC: 91 MG/DL (ref 65–140)
HCT VFR BLD AUTO: 46.6 % (ref 34.8–46.1)
HGB BLD-MCNC: 15.1 G/DL (ref 11.5–15.4)
MCH RBC QN AUTO: 28.4 PG (ref 26.8–34.3)
MCHC RBC AUTO-ENTMCNC: 32.4 G/DL (ref 31.4–37.4)
MCV RBC AUTO: 88 FL (ref 82–98)
PLATELET # BLD AUTO: 265 THOUSANDS/UL (ref 149–390)
PMV BLD AUTO: 10.7 FL (ref 8.9–12.7)
POTASSIUM SERPL-SCNC: 3.7 MMOL/L (ref 3.5–5.3)
RBC # BLD AUTO: 5.31 MILLION/UL (ref 3.81–5.12)
SODIUM SERPL-SCNC: 137 MMOL/L (ref 135–147)
WBC # BLD AUTO: 8.77 THOUSAND/UL (ref 4.31–10.16)

## 2023-03-10 RX ORDER — POTASSIUM CHLORIDE 14.9 MG/ML
20 INJECTION INTRAVENOUS ONCE
Status: COMPLETED | OUTPATIENT
Start: 2023-03-10 | End: 2023-03-10

## 2023-03-10 RX ORDER — LABETALOL HYDROCHLORIDE 5 MG/ML
10 INJECTION, SOLUTION INTRAVENOUS EVERY 6 HOURS PRN
Status: DISCONTINUED | OUTPATIENT
Start: 2023-03-10 | End: 2023-03-12 | Stop reason: HOSPADM

## 2023-03-10 RX ADMIN — METRONIDAZOLE 500 MG: 500 INJECTION, SOLUTION INTRAVENOUS at 15:54

## 2023-03-10 RX ADMIN — SODIUM CHLORIDE, SODIUM LACTATE, POTASSIUM CHLORIDE, AND CALCIUM CHLORIDE 125 ML/HR: .6; .31; .03; .02 INJECTION, SOLUTION INTRAVENOUS at 05:22

## 2023-03-10 RX ADMIN — POTASSIUM CHLORIDE 20 MEQ: 14.9 INJECTION, SOLUTION INTRAVENOUS at 11:58

## 2023-03-10 RX ADMIN — SODIUM CHLORIDE, SODIUM LACTATE, POTASSIUM CHLORIDE, AND CALCIUM CHLORIDE 125 ML/HR: .6; .31; .03; .02 INJECTION, SOLUTION INTRAVENOUS at 16:57

## 2023-03-10 RX ADMIN — PANTOPRAZOLE SODIUM 40 MG: 40 INJECTION, POWDER, FOR SOLUTION INTRAVENOUS at 08:24

## 2023-03-10 RX ADMIN — CEFTRIAXONE 1000 MG: 2 INJECTION, POWDER, FOR SOLUTION INTRAMUSCULAR; INTRAVENOUS at 20:34

## 2023-03-10 RX ADMIN — METRONIDAZOLE 500 MG: 500 INJECTION, SOLUTION INTRAVENOUS at 05:22

## 2023-03-10 NOTE — PROGRESS NOTES
Progress Note - Colorectal Surgery   Rigoberto Regan 46 y o  female MRN: [id number]  Unit/Bed#: S -01 Encounter: [id number]    Assessment:  45yo F with partial large bowel obstruction in setting of known history of sigmoid colon stricture and enterocolonic fistulas    Vital stable, afebrile  WBC 8 77 from 8 96  Hemoglobin 15 1 from 15 0  Creatinine 0 52        Plan:  -NPO/NGT, clamp trial today  -Ceftriaxone/Flagyl  -[email or web address]  -Encourage ambulation  -SQH  -Pain control    Subjective/Objective   Subjective:   Doing well, reports minimal abdominal pain, feeling better, denies any nausea or emesis, remains n p o  with NGT in place, passing flatus but not yet having bowel movements, ambulating without issues  Objective:     Blood pressure 157/96, pulse 95, temperature 99 8 °F (37 7 °C), resp  rate 16, height 4' 9" (1 448 m), SpO2 (!) 89 %  ,Body mass index is 43 28 kg/m²  No intake or output data in the 24 hours ending 03/10/23 0748    Invasive Devices     Peripheral Intravenous Line  Duration           Peripheral IV 03/08/23 Right Antecubital 1 day          Drain  Duration           NG/OG/Enteral Tube Nasogastric 16 Fr Right nare 1 day                Physical Exam:   General: NAD  Skin: Warm, dry, anicteric  HEENT: Normocephalic, atraumatic  CV: RRR, no m/r/g  Pulm: CTA b/l, no inc WOB  Abd: Soft, ND/NT  MSK: Symmetric, no edema, no tenderness, no deformity  Neuro: AOx3, GCS 15    Lab, Imaging and other studies:  I have personally reviewed pertinent lab results    , CBC:   Lab Results   Component Value Date    WBC 8 77 03/10/2023    HGB 15 1 03/10/2023    HCT 46 6 (H) 03/10/2023    MCV 88 03/10/2023     03/10/2023    MCH 28 4 03/10/2023    MCHC 32 4 03/10/2023    RDW 13 3 03/10/2023    MPV 10 7 03/10/2023   , CMP:   Lab Results   Component Value Date    SODIUM 137 03/10/2023    K 3 7 03/10/2023     03/10/2023    CO2 24 03/10/2023    BUN 8 03/10/2023    CREATININE 0 52 (L) 03/10/2023 CALCIUM 8 4 03/10/2023    EGFR 110 03/10/2023     VTE Pharmacologic Prophylaxis: Sequential compression device (Venodyne)  and Heparin  VTE Mechanical Prophylaxis: sequential compression device

## 2023-03-10 NOTE — PLAN OF CARE
Problem: Potential for Falls  Goal: Patient will remain free of falls  Description: INTERVENTIONS:  - Educate patient/family on patient safety including physical limitations  - Instruct patient to call for assistance with activity   - Consult OT/PT to assist with strengthening/mobility   - Keep Call bell within reach  - Keep bed low and locked with side rails adjusted as appropriate  - Keep care items and personal belongings within reach  - Initiate and maintain comfort rounds  - Make Fall Risk Sign visible to staff  - Offer Toileting every  Hours, in advance of need  - Initiate/Maintain alarm  - Obtain necessary fall risk management equipment: - Apply yellow socks and bracelet for high fall risk patients  - Consider moving patient to room near nurses station  Outcome: Progressing     Problem: Nutrition/Hydration-ADULT  Goal: Nutrient/Hydration intake appropriate for improving, restoring or maintaining nutritional needs  Description: Monitor and assess patient's nutrition/hydration status for malnutrition  Collaborate with interdisciplinary team and initiate plan and interventions as ordered  Monitor patient's weight and dietary intake as ordered or per policy  Utilize nutrition screening tool and intervene as necessary  Determine patient's food preferences and provide high-protein, high-caloric foods as appropriate       INTERVENTIONS:  - Monitor oral intake, urinary output, labs, and treatment plans  - Assess nutrition and hydration status and recommend course of action  - Evaluate amount of meals eaten  - Assist patient with eating if necessary   - Allow adequate time for meals  - Recommend/ encourage appropriate diets, oral nutritional supplements, and vitamin/mineral supplements  - Order, calculate, and assess calorie counts as needed  - Recommend, monitor, and adjust tube feedings and TPN/PPN based on assessed needs  - Assess need for intravenous fluids  - Provide specific nutrition/hydration education as appropriate  - Include patient/family/caregiver in decisions related to nutrition  Outcome: Progressing     Problem: PAIN - ADULT  Goal: Verbalizes/displays adequate comfort level or baseline comfort level  Description: Interventions:  - Encourage patient to monitor pain and request assistance  - Assess pain using appropriate pain scale  - Administer analgesics based on type and severity of pain and evaluate response  - Implement non-pharmacological measures as appropriate and evaluate response  - Consider cultural and social influences on pain and pain management  - Notify physician/advanced practitioner if interventions unsuccessful or patient reports new pain  Outcome: Progressing     Problem: SAFETY ADULT  Goal: Maintain or return to baseline ADL function  Description: INTERVENTIONS:  -  Assess patient's ability to carry out ADLs; assess patient's baseline for ADL function and identify physical deficits which impact ability to perform ADLs (bathing, care of mouth/teeth, toileting, grooming, dressing, etc )  - Assess/evaluate cause of self-care deficits   - Assess range of motion  - Assess patient's mobility; develop plan if impaired  - Assess patient's need for assistive devices and provide as appropriate  - Encourage maximum independence but intervene and supervise when necessary  - Involve family in performance of ADLs  - Assess for home care needs following discharge   - Consider OT consult to assist with ADL evaluation and planning for discharge  - Provide patient education as appropriate  Outcome: Progressing

## 2023-03-10 NOTE — PLAN OF CARE
Problem: Potential for Falls  Goal: Patient will remain free of falls  Description: INTERVENTIONS:  - Educate patient/family on patient safety including physical limitations  - Instruct patient to call for assistance with activity   - Consult OT/PT to assist with strengthening/mobility   - Keep Call bell within reach  - Keep bed low and locked with side rails adjusted as appropriate  - Keep care items and personal belongings within reach  - Initiate and maintain comfort rounds  - Make Fall Risk Sign visible to staff  - Apply yellow socks and bracelet for high fall risk patients  - Consider moving patient to room near nurses station  Outcome: Progressing     Problem: Nutrition/Hydration-ADULT  Goal: Nutrient/Hydration intake appropriate for improving, restoring or maintaining nutritional needs  Description: Monitor and assess patient's nutrition/hydration status for malnutrition  Collaborate with interdisciplinary team and initiate plan and interventions as ordered  Monitor patient's weight and dietary intake as ordered or per policy  Utilize nutrition screening tool and intervene as necessary  Determine patient's food preferences and provide high-protein, high-caloric foods as appropriate       INTERVENTIONS:  - Monitor oral intake, urinary output, labs, and treatment plans  - Assess nutrition and hydration status and recommend course of action  - Evaluate amount of meals eaten  - Assist patient with eating if necessary   - Allow adequate time for meals  - Recommend/ encourage appropriate diets, oral nutritional supplements, and vitamin/mineral supplements  - Order, calculate, and assess calorie counts as needed  - Recommend, monitor, and adjust tube feedings and TPN/PPN based on assessed needs  - Assess need for intravenous fluids  - Provide specific nutrition/hydration education as appropriate  - Include patient/family/caregiver in decisions related to nutrition  Outcome: Progressing     Problem: PAIN - ADULT  Goal: Verbalizes/displays adequate comfort level or baseline comfort level  Description: Interventions:  - Encourage patient to monitor pain and request assistance  - Assess pain using appropriate pain scale  - Administer analgesics based on type and severity of pain and evaluate response  - Implement non-pharmacological measures as appropriate and evaluate response  - Consider cultural and social influences on pain and pain management  - Notify physician/advanced practitioner if interventions unsuccessful or patient reports new pain  Outcome: Progressing     Problem: SAFETY ADULT  Goal: Maintain or return to baseline ADL function  Description: INTERVENTIONS:  -  Assess patient's ability to carry out ADLs; assess patient's baseline for ADL function and identify physical deficits which impact ability to perform ADLs (bathing, care of mouth/teeth, toileting, grooming, dressing, etc )  - Assess/evaluate cause of self-care deficits   - Assess range of motion  - Assess patient's mobility; develop plan if impaired  - Assess patient's need for assistive devices and provide as appropriate  - Encourage maximum independence but intervene and supervise when necessary  - Involve family in performance of ADLs  - Assess for home care needs following discharge   - Consider OT consult to assist with ADL evaluation and planning for discharge  - Provide patient education as appropriate  Outcome: Progressing

## 2023-03-11 LAB
ANION GAP SERPL CALCULATED.3IONS-SCNC: 8 MMOL/L (ref 4–13)
BASOPHILS # BLD AUTO: 0.08 THOUSANDS/ÂΜL (ref 0–0.1)
BASOPHILS NFR BLD AUTO: 1 % (ref 0–1)
BUN SERPL-MCNC: 8 MG/DL (ref 5–25)
CALCIUM SERPL-MCNC: 8.2 MG/DL (ref 8.4–10.2)
CHLORIDE SERPL-SCNC: 103 MMOL/L (ref 96–108)
CO2 SERPL-SCNC: 24 MMOL/L (ref 21–32)
CREAT SERPL-MCNC: 0.47 MG/DL (ref 0.6–1.3)
EOSINOPHIL # BLD AUTO: 0.12 THOUSAND/ÂΜL (ref 0–0.61)
EOSINOPHIL NFR BLD AUTO: 2 % (ref 0–6)
ERYTHROCYTE [DISTWIDTH] IN BLOOD BY AUTOMATED COUNT: 13.2 % (ref 11.6–15.1)
GFR SERPL CREATININE-BSD FRML MDRD: 113 ML/MIN/1.73SQ M
GLUCOSE SERPL-MCNC: 73 MG/DL (ref 65–140)
HCT VFR BLD AUTO: 42.3 % (ref 34.8–46.1)
HGB BLD-MCNC: 13.7 G/DL (ref 11.5–15.4)
IMM GRANULOCYTES # BLD AUTO: 0.04 THOUSAND/UL (ref 0–0.2)
IMM GRANULOCYTES NFR BLD AUTO: 1 % (ref 0–2)
LYMPHOCYTES # BLD AUTO: 1.89 THOUSANDS/ÂΜL (ref 0.6–4.47)
LYMPHOCYTES NFR BLD AUTO: 27 % (ref 14–44)
MCH RBC QN AUTO: 28.4 PG (ref 26.8–34.3)
MCHC RBC AUTO-ENTMCNC: 32.4 G/DL (ref 31.4–37.4)
MCV RBC AUTO: 88 FL (ref 82–98)
MONOCYTES # BLD AUTO: 0.85 THOUSAND/ÂΜL (ref 0.17–1.22)
MONOCYTES NFR BLD AUTO: 12 % (ref 4–12)
NEUTROPHILS # BLD AUTO: 4.13 THOUSANDS/ÂΜL (ref 1.85–7.62)
NEUTS SEG NFR BLD AUTO: 57 % (ref 43–75)
NRBC BLD AUTO-RTO: 0 /100 WBCS
PLATELET # BLD AUTO: 220 THOUSANDS/UL (ref 149–390)
PMV BLD AUTO: 10.9 FL (ref 8.9–12.7)
POTASSIUM SERPL-SCNC: 3.7 MMOL/L (ref 3.5–5.3)
RBC # BLD AUTO: 4.82 MILLION/UL (ref 3.81–5.12)
SODIUM SERPL-SCNC: 135 MMOL/L (ref 135–147)
WBC # BLD AUTO: 7.11 THOUSAND/UL (ref 4.31–10.16)

## 2023-03-11 RX ORDER — POTASSIUM CHLORIDE 14.9 MG/ML
20 INJECTION INTRAVENOUS ONCE
Status: COMPLETED | OUTPATIENT
Start: 2023-03-11 | End: 2023-03-11

## 2023-03-11 RX ORDER — SODIUM CHLORIDE, SODIUM LACTATE, POTASSIUM CHLORIDE, CALCIUM CHLORIDE 600; 310; 30; 20 MG/100ML; MG/100ML; MG/100ML; MG/100ML
75 INJECTION, SOLUTION INTRAVENOUS CONTINUOUS
Status: DISCONTINUED | OUTPATIENT
Start: 2023-03-11 | End: 2023-03-12

## 2023-03-11 RX ORDER — CALCIUM GLUCONATE 20 MG/ML
1 INJECTION, SOLUTION INTRAVENOUS ONCE
Status: COMPLETED | OUTPATIENT
Start: 2023-03-11 | End: 2023-03-11

## 2023-03-11 RX ORDER — OXYCODONE HYDROCHLORIDE 10 MG/1
10 TABLET ORAL EVERY 4 HOURS PRN
Status: DISCONTINUED | OUTPATIENT
Start: 2023-03-11 | End: 2023-03-12 | Stop reason: HOSPADM

## 2023-03-11 RX ORDER — OXYCODONE HYDROCHLORIDE 5 MG/1
5 TABLET ORAL EVERY 4 HOURS PRN
Status: DISCONTINUED | OUTPATIENT
Start: 2023-03-11 | End: 2023-03-12 | Stop reason: HOSPADM

## 2023-03-11 RX ADMIN — PANTOPRAZOLE SODIUM 40 MG: 40 INJECTION, POWDER, FOR SOLUTION INTRAVENOUS at 08:29

## 2023-03-11 RX ADMIN — POTASSIUM CHLORIDE 20 MEQ: 14.9 INJECTION, SOLUTION INTRAVENOUS at 09:49

## 2023-03-11 RX ADMIN — CEFTRIAXONE 1000 MG: 2 INJECTION, POWDER, FOR SOLUTION INTRAMUSCULAR; INTRAVENOUS at 19:47

## 2023-03-11 RX ADMIN — METRONIDAZOLE 500 MG: 500 INJECTION, SOLUTION INTRAVENOUS at 08:29

## 2023-03-11 RX ADMIN — METRONIDAZOLE 500 MG: 500 INJECTION, SOLUTION INTRAVENOUS at 00:04

## 2023-03-11 RX ADMIN — CALCIUM GLUCONATE 1 G: 20 INJECTION, SOLUTION INTRAVENOUS at 12:08

## 2023-03-11 RX ADMIN — METRONIDAZOLE 500 MG: 500 INJECTION, SOLUTION INTRAVENOUS at 15:22

## 2023-03-11 NOTE — PLAN OF CARE
Problem: Potential for Falls  Goal: Patient will remain free of falls  Description: INTERVENTIONS:  - Educate patient/family on patient safety including physical limitations  - Instruct patient to call for assistance with activity   - Consult OT/PT to assist with strengthening/mobility   - Keep Call bell within reach  - Keep bed low and locked with side rails adjusted as appropriate  - Keep care items and personal belongings within reach  - Initiate and maintain comfort rounds  - Make Fall Risk Sign visible to staff  - Offer Toileting every 2 Hours, in advance of need  - Initiate/Maintain bed alarm  - Obtain necessary fall risk management equipment: bed alarm  - Apply yellow socks and bracelet for high fall risk patients  - Consider moving patient to room near nurses station  Outcome: Progressing     Problem: Nutrition/Hydration-ADULT  Goal: Nutrient/Hydration intake appropriate for improving, restoring or maintaining nutritional needs  Description: Monitor and assess patient's nutrition/hydration status for malnutrition  Collaborate with interdisciplinary team and initiate plan and interventions as ordered  Monitor patient's weight and dietary intake as ordered or per policy  Utilize nutrition screening tool and intervene as necessary  Determine patient's food preferences and provide high-protein, high-caloric foods as appropriate       INTERVENTIONS:  - Monitor oral intake, urinary output, labs, and treatment plans  - Assess nutrition and hydration status and recommend course of action  - Evaluate amount of meals eaten  - Assist patient with eating if necessary   - Allow adequate time for meals  - Recommend/ encourage appropriate diets, oral nutritional supplements, and vitamin/mineral supplements  - Order, calculate, and assess calorie counts as needed  - Recommend, monitor, and adjust tube feedings and TPN/PPN based on assessed needs  - Assess need for intravenous fluids  - Provide specific nutrition/hydration education as appropriate  - Include patient/family/caregiver in decisions related to nutrition  Outcome: Progressing     Problem: PAIN - ADULT  Goal: Verbalizes/displays adequate comfort level or baseline comfort level  Description: Interventions:  - Encourage patient to monitor pain and request assistance  - Assess pain using appropriate pain scale  - Administer analgesics based on type and severity of pain and evaluate response  - Implement non-pharmacological measures as appropriate and evaluate response  - Consider cultural and social influences on pain and pain management  - Notify physician/advanced practitioner if interventions unsuccessful or patient reports new pain  Outcome: Progressing     Problem: SAFETY ADULT  Goal: Maintain or return to baseline ADL function  Description: INTERVENTIONS:  -  Assess patient's ability to carry out ADLs; assess patient's baseline for ADL function and identify physical deficits which impact ability to perform ADLs (bathing, care of mouth/teeth, toileting, grooming, dressing, etc )  - Assess/evaluate cause of self-care deficits   - Assess range of motion  - Assess patient's mobility; develop plan if impaired  - Assess patient's need for assistive devices and provide as appropriate  - Encourage maximum independence but intervene and supervise when necessary  - Involve family in performance of ADLs  - Assess for home care needs following discharge   - Consider OT consult to assist with ADL evaluation and planning for discharge  - Provide patient education as appropriate  Outcome: Progressing

## 2023-03-11 NOTE — PROGRESS NOTES
Progress Note - Colorectal Surgery   Remonia Section 46 y o  female MRN: [id number]  Unit/Bed#: S -01 Encounter: [id number]    Assessment:  47yo F with partial large bowel obstruction in setting of known history of sigmoid colon stricture and enterocolonic fistulas    Vital stable, afebrile  WBC 7 11 (8 77)  Hemoglobin 13 7 (15 1)    Plan:  - NPO  - cont abx; ceftriaxone/flagyl  - [email or web address]  - Encourage ambulation  - SQH  - Pain control  - Please TigerText the surgery resident or AP role with any questions  Subjective/Objective   Subjective:   No acute events overnight  Having BMs, passing flatus  No nausea, vomiting  Objective:     Blood pressure 164/95, pulse 86, temperature 98 8 °F (37 1 °C), temperature source Oral, resp  rate 17, height 4' 9" (1 448 m), SpO2 91 %  ,Body mass index is 43 28 kg/m²  Intake/Output Summary (Last 24 hours) at 3/11/2023 0718  Last data filed at 3/10/2023 1542  Gross per 24 hour   Intake --   Output 325 ml   Net -325 ml       Invasive Devices     Peripheral Intravenous Line  Duration           Peripheral IV 03/08/23 Right Antecubital 2 days                Physical Exam:  General: No acute distress  Neuro: alert and oriented  HEENT: moist mucous membranes  CV: Well perfused, regular rate and rhythm  Lungs: Normal work of breathing, no increased respiratory effort  Abdomen: Soft, non-tender, non-distended  Extremities: No edema, clubbing or cyanosis  Skin: Warm, dry    Lab, Imaging and other studies:  I have personally reviewed pertinent lab results    , CBC:   Lab Results   Component Value Date    WBC 7 11 03/11/2023    HGB 13 7 03/11/2023    HCT 42 3 03/11/2023    MCV 88 03/11/2023     03/11/2023    MCH 28 4 03/11/2023    MCHC 32 4 03/11/2023    RDW 13 2 03/11/2023    MPV 10 9 03/11/2023    NRBC 0 03/11/2023   , CMP:   Lab Results   Component Value Date    SODIUM 135 03/11/2023    K 3 7 03/11/2023     03/11/2023    CO2 24 03/11/2023    BUN 8 03/11/2023 CREATININE 0 47 (L) 03/11/2023    CALCIUM 8 2 (L) 03/11/2023    EGFR 113 03/11/2023     VTE Pharmacologic Prophylaxis: Sequential compression device (Venodyne)  and Heparin  VTE Mechanical Prophylaxis: sequential compression device

## 2023-03-12 VITALS
HEART RATE: 65 BPM | SYSTOLIC BLOOD PRESSURE: 152 MMHG | OXYGEN SATURATION: 94 % | DIASTOLIC BLOOD PRESSURE: 96 MMHG | TEMPERATURE: 98.6 F | RESPIRATION RATE: 17 BRPM | HEIGHT: 57 IN | BODY MASS INDEX: 43.28 KG/M2

## 2023-03-12 PROBLEM — K56.609 LARGE BOWEL OBSTRUCTION (HCC): Status: ACTIVE | Noted: 2023-03-12

## 2023-03-12 LAB
ANION GAP SERPL CALCULATED.3IONS-SCNC: 8 MMOL/L (ref 4–13)
BUN SERPL-MCNC: 8 MG/DL (ref 5–25)
CALCIUM SERPL-MCNC: 8.2 MG/DL (ref 8.4–10.2)
CHLORIDE SERPL-SCNC: 104 MMOL/L (ref 96–108)
CO2 SERPL-SCNC: 23 MMOL/L (ref 21–32)
CREAT SERPL-MCNC: 0.46 MG/DL (ref 0.6–1.3)
GFR SERPL CREATININE-BSD FRML MDRD: 114 ML/MIN/1.73SQ M
GLUCOSE SERPL-MCNC: 82 MG/DL (ref 65–140)
MAGNESIUM SERPL-MCNC: 1.9 MG/DL (ref 1.9–2.7)
PHOSPHATE SERPL-MCNC: 3 MG/DL (ref 2.7–4.5)
POTASSIUM SERPL-SCNC: 4.1 MMOL/L (ref 3.5–5.3)
SODIUM SERPL-SCNC: 135 MMOL/L (ref 135–147)

## 2023-03-12 RX ORDER — AMOXICILLIN AND CLAVULANATE POTASSIUM 875; 125 MG/1; MG/1
1 TABLET, FILM COATED ORAL EVERY 12 HOURS SCHEDULED
Qty: 14 TABLET | Refills: 0 | Status: SHIPPED | OUTPATIENT
Start: 2023-03-12 | End: 2023-03-19

## 2023-03-12 RX ADMIN — PANTOPRAZOLE SODIUM 40 MG: 40 INJECTION, POWDER, FOR SOLUTION INTRAVENOUS at 08:58

## 2023-03-12 RX ADMIN — METRONIDAZOLE 500 MG: 500 INJECTION, SOLUTION INTRAVENOUS at 00:25

## 2023-03-12 RX ADMIN — METRONIDAZOLE 500 MG: 500 INJECTION, SOLUTION INTRAVENOUS at 08:19

## 2023-03-12 NOTE — DISCHARGE SUMMARY
Discharge Summary - Colorectal Surgery   Navjot Mahmood 46 y o  female MRN: [id number]  Unit/Bed#: S -01 Encounter: [id number]    Admission Date: 3/8/2023     Discharge Date: 3/12/2023    Admitting Diagnosis: Bowel obstruction ([address] ) [K56 609]  Abdominal pain [R10 9]  Flank pain [R10 9]    Discharge Diagnosis: Large bowel obstruction    Attending and Service: Dr Lawrence Lomax, Colorectal Surgery      Imaging and Procedures Performed:   Orders Placed This Encounter   Procedures   • POC Biliary US       XR chest portable    Result Date: 3/10/2023  Impression: Nasogastric tube tip is in the stomach  Mild linear atelectasis at the left lung base, otherwise clear lungs  Workstation performed: [id number]     XR chest portable    Result Date: 3/10/2023  Impression: Nasogastric tube tip is in the stomach  Mild linear atelectasis at the left lung base, otherwise clear lungs  Workstation performed: [id number]     CT abdomen pelvis with contrast    Result Date: 3/8/2023  Impression: Sigmoid diverticulosis with wall thickening  There is now partial obstruction of the colon with dilatation throughout that appears to be at the site of the previously described enterocolonic fistula image 2/121  Surgical consultation suggested  Stable hepatic steatosis  Stable anterior uterine fibroid  Workstation performed: [id number]         Pathology: N/A    Hospital Course:  Per H&P:   "Navjot Mahmood is a 46 y o  female who presents with nausea, vomiting, and abdominal pain  Started around 2/14 when she and her  both became sick with a "stomach bug " Her appetite hasn't fully recovered since that time, although her symptoms seemed to be slightly improving  Last BM was 4 days ago, but she has been passing flatus throughout the day today  Has been nauseous and vomited the oral contrast she was given in the ED   Of note, she was admitted end of December 2022 for acute diverticulitis and 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  She had obstructive symptoms at that time which improved with non-op management  Her symptoms were resolved at discharge, but started to return since 2/14      Colonoscopy august 2020 did not show evidence of the fistulas and stricture, but did show a tortuous colon with diverticulosis  Has been following up with gen surg outpatient, but given more complicated presentation and upcoming appointment with colorectal surgery, the patient will be admitted to colorectal service      PMHx diverticulitis and htn  PSHx tubal ligation 1991 "    She was treated with nasogastric tube decompression, bowel rest, and broad spectrum antibiotics  It was discussed with the patient that she will need surgery for absolute resolution of these recurrent obstructions  She deferred surgery at this time and prefers to follow up outpatient to plan for elective surgery  Once she was tolerating oral intake, ambulating, having consistent bowel function, and her pain was well controlled, she was discharged  On discharge, the patient is instructed to follow-up with the patient's primary care provider within the next 2 weeks to review the events of the recent hospitalization  The patient is instructed to follow-up with Dr Joel Ramos or Dr Errol Al for a colonoscopy and to discuss surgery planning  The patient is instructed to follow the provided discharge instructions  Condition at Discharge: good     Discharge instructions/Information to patient and family:   See after visit summary for information provided to patient and family  Provisions for Follow-Up Care:  See after visit summary for information related to follow-up care and any pertinent home health orders  Disposition: Home    Planned Readmission: No    Discharge Statement   I spent 20 minutes discharging the patient  This time was spent on the day of discharge  I had direct contact with the patient on the day of discharge   Additional documentation is required if more than 30 minutes were spent on discharge  Discharge Medications:  See after visit summary for reconciled discharge medications provided to patient and family      Clyde Euceda MD  3/12/2023  11:11 AM

## 2023-03-12 NOTE — PROGRESS NOTES
Progress Note - Colorectal Surgery   Asael Jean 46 y o  female MRN: [id number]  Unit/Bed#: S -01 Encounter: [id number]    Assessment:  45yo F with partial large bowel obstruction in setting of known history of sigmoid colon stricture and enterocolonic fistulas    VSS  I/O poorly recorded, multiple unmeasured voids    Plan:  - Diet advanced this morning  - cont abx; ceftriaxone/flagyl  - Encourage ambulation  - SQH  - Pain control PRN  - Please TigerText the surgery resident or AP role with any questions  Subjective/Objective   Subjective:   Patient seen and examined bedside  Denies nausea or vomiting  Tolerating clears  Passing flatus, last BM Friday  Objective:     Blood pressure 152/96, pulse 65, temperature 98 6 °F (37 °C), resp  rate 17, height 4' 9" (1 448 m), SpO2 94 %  ,Body mass index is 43 28 kg/m²  Intake/Output Summary (Last 24 hours) at 3/12/2023 0663  Last data filed at 3/11/2023 1737  Gross per 24 hour   Intake 120 ml   Output --   Net 120 ml       Invasive Devices     Peripheral Intravenous Line  Duration           Peripheral IV 03/08/23 Right Antecubital 3 days                Physical Exam:  General - no acute distress, responsive and cooperative  CV - warm, regular rate  Pulm - normal work of breathing, no respiratory distress  Abd - soft, nondistended, nontender  Neuro - m/s grossly intact, cn grossly intact  Ext - moving all extremities      Lab, Imaging and other studies:  I have personally reviewed pertinent lab results    , CBC:   No results found for: WBC, HGB, HCT, MCV, PLT, ADJUSTEDWBC, MCH, MCHC, RDW, MPV, NRBC, CMP:   Lab Results   Component Value Date    SODIUM 135 03/12/2023    K 4 1 03/12/2023     03/12/2023    CO2 23 03/12/2023    BUN 8 03/12/2023    CREATININE 0 46 (L) 03/12/2023    CALCIUM 8 2 (L) 03/12/2023    EGFR 114 03/12/2023     VTE Pharmacologic Prophylaxis: Sequential compression device (Venodyne)  and Heparin  VTE Mechanical Prophylaxis: sequential compression device

## 2023-03-12 NOTE — UTILIZATION REVIEW
Continued Stay Review    Date: 3/11/23                          Current Patient Class: IP Current Level of Care:  MS    HPI:52 y o  female initially admitted on 3/8/23    Assessment/Plan:3/11 Diverticulitis with partial bowel obstruction, known sigmoid colonic stricture and and enterocolonic fistula  Pt had clamp trial 3/10,  XR abdomen KUB ordered 3/10   NGT d/c'ed 3/10 afternoon    Pt afebrile, passing flatus and having bm's   No N/V   Abdomen soft, non tender, non distended  Pt continues on dual IV abx   NPO this am with IVF  Potassium and calcium repletion ordered today  Labs in am  Encourage ambulation    Pt ordered CL diet today with toast and crax this afternoon , monitor tolerance to diet  Pt will need elective sx as outpt in future        Vital Signs:   Date/Time Temp Pulse Resp BP MAP (mmHg) SpO2 O2 Device Patient Position - Orthostatic VS   03/11/23 2240 -- 58 17 165/85 112 92 % None (Room air) Lying   03/11/23 22:22:13 98 9 °F (37 2 °C) 57 18 179/92 Abnormal  121 92 % None (Room air) Lying   03/11/23 15:47:53 99 6 °F (37 6 °C) 61 18 168/85 113 97 % -- --   03/11/23 07:30:49 98 9 °F (37 2 °C) 70 -- 151/87 108 94 % -- --   03/11/23 04:59:32 98 8 °F (37 1 °C) 86 17 164/95 118 91 % None (Room air) Lying   03/10/23 20:57:13 98 3 °F (36 8 °C) -- -- 144/82 103 -- -- --   03/10/23 2000 -- -- -- -- -- 96 % None (Room air) --   03/10/23 1542 98 3 °F (36 8 °C) -- -- 167/96 -- -- -- --   03/10/23 15:23:48 98 6 °F (37 °C) -- -- 175/90 Abnormal  118 -- -- --   03/10/23 07:52:31 98 2 °F (36 8 °C) -- -- 169/106 Abnormal  127 --         Pertinent Labs/Diagnostic Results:     3/10 XR abdomen KUB-not read yet  Results from last 7 days   Lab Units 03/11/23  0455 03/10/23  0537 03/09/23  0525 03/08/23  1201   WBC Thousand/uL 7 11 8 77 8 96 12 61*   HEMOGLOBIN g/dL 13 7 15 1 15 0 16 6*   HEMATOCRIT % 42 3 46 6* 45 0 50 9*   PLATELETS Thousands/uL 220 265 251 304   NEUTROS ABS Thousands/µL 4 13  --  6 93 9 59* Results from last 7 days   Lab Units 03/11/23  0455 03/10/23  0537 03/09/23  0525 03/08/23  1201   SODIUM mmol/L 135 137 135 136   POTASSIUM mmol/L 3 7 3 7 3 1* 3 3*   CHLORIDE mmol/L 103 105 104 101   CO2 mmol/L 24 24 22 26   ANION GAP mmol/L 8 8 9 9   BUN mg/dL 8 8 8 11   CREATININE mg/dL 0 47* 0 52* 0 54* 0 67   EGFR ml/min/1 73sq m 113 110 108 101   CALCIUM mg/dL 8 2* 8 4 8 2* 9 4   MAGNESIUM mg/dL  --   --  2 1 2 1   PHOSPHORUS mg/dL  --   --  3 5  --      Results from last 7 days   Lab Units 03/08/23  1201   AST U/L 19   ALT U/L 21   ALK PHOS U/L 80   TOTAL PROTEIN g/dL 7 9   ALBUMIN g/dL 4 1   TOTAL BILIRUBIN mg/dL 0 89         Results from last 7 days   Lab Units 03/12/23  0635 03/11/23  0455 03/10/23  0537 03/09/23  0525 03/08/23  1201   GLUCOSE RANDOM mg/dL 82 73 91 107 94                   Results from last 7 days   Lab Units 03/08/23  1201   LIPASE u/L <6*                 Results from last 7 days   Lab Units 03/08/23  1210   CLARITY UA  Turbid   COLOR UA  Yellow   SPEC GRAV UA  1 018   PH UA  6 0   GLUCOSE UA mg/dl Negative   KETONES UA mg/dl Negative   BLOOD UA  Negative   PROTEIN UA mg/dl Trace*   NITRITE UA  Negative   BILIRUBIN UA  Negative   UROBILINOGEN UA (BE) mg/dl 3 0*   LEUKOCYTES UA  Moderate*   WBC UA /hpf 20-30*   RBC UA /hpf None Seen   BACTERIA UA /hpf Occasional   EPITHELIAL CELLS WET PREP /hpf Moderate*           Results from last 7 days   Lab Units 03/08/23  1210   URINE CULTURE  20,000-29,000 cfu/ml                 Medications:   Scheduled Medications:  cefTRIAXone, 1,000 mg, Intravenous, Q24H  heparin (porcine), 5,000 Units, Subcutaneous, Q8H DEMIAN  metroNIDAZOLE, 500 mg, Intravenous, Q8H  nicotine, 1 patch, Transdermal, Daily  pantoprazole, 40 mg, Intravenous, Q24H DEMIAN    potassium chloride 20 mEq IVPB (premix)  Dose: 20 mEq  Freq:  Once Route: IV  Last Dose: 20 mEq (03/11/23 0949)  Start: 03/11/23 0945 End: 03/11/23 1149  calcium gluconate 1 g in sodium chloride 0 9% 50 mL (premix)  Dose: 1 g  Freq: Once Route: IV  Last Dose: Stopped (03/11/23 1458)  Start: 03/11/23 1000 End: 03/11/23 1458      Continuous IV Infusions:    lactated ringers infusion  Rate: 125 mL/hr Dose: 125 mL/hr  Freq: Continuous Route: IV  Last Dose: 125 mL/hr (03/10/23 1657)  Start: 03/08/23 2015 End: 03/11/23 2111  lactated ringers infusion  Rate: 75 mL/hr Dose: 75 mL/hr  Freq: Continuous Route: IV  Indications of Use: IV Hydration  Last Dose: 75 mL/hr (03/11/23 2132)  Start: 03/11/23 2115         PRN Meds:  labetalol, 10 mg, Intravenous, Q6H PRN  ondansetron, 4 mg, Intravenous, Q6H PRN  oxyCODONE, 10 mg, Oral, Q4H PRN  oxyCODONE, 5 mg, Oral, Q4H PRN  trimethobenzamide, 200 mg, Intramuscular, Q6H PRN        Discharge Plan:[facility]  Network Utilization Review Department  ATTENTION: Please call with any questions or concerns to [phone] and carefully listen to the prompts so that you are directed to the right person  All voicemails are confidential   Yadi Finder all requests for admission clinical reviews, approved or denied determinations and any other requests to dedicated fax number below belonging to the campus where the patient is receiving treatment   List of dedicated fax numbers for the Facilities:  [address] DENIALS (Administrative/Medical Necessity) [phone]   [address] (Maternity/NICU/Pediatrics) [phone]   [address] [phone]   [facility] [phone]   [facility] [phone]   [address] [address] Medical [place] [place] [address] [address] [phone]   [address] [address] [place]  [phone]

## 2023-03-13 NOTE — UTILIZATION REVIEW
NOTIFICATION OF ADMISSION DISCHARGE   This is a Notification of Discharge from [address] [facility]  Please be advised that this patient has been discharge from our facility  Below you will find the admission and discharge date and time including the patient’s disposition  UTILIZATION REVIEW CONTACT:  Lupe Pereira MA  Utilization   Network Utilization Review Department  Phone: [phone] x carefully listen to the prompts  All voicemails are confidential   Email: [email or web address]     ADMISSION INFORMATION  PRESENTATION DATE: 3/8/2023 10:53 AM  OBERVATION ADMISSION DATE:   INPATIENT ADMISSION DATE: 3/8/23  8:04 PM   DISCHARGE DATE: 3/12/2023 12:30 PM   DISPOSITION:Home/Self Care    IMPORTANT INFORMATION:  Send all requests for admission clinical reviews, approved or denied determinations and any other requests to dedicated fax number below belonging to the campus where the patient is receiving treatment   List of dedicated fax numbers:  [address] DENIALS (Administrative/Medical Necessity) [phone]   [address] (Maternity/NICU/Pediatrics) [phone]   [facility] [phone]   [place] [address] [phone]   [address] [phone]   [address] [facility] [phone]   90 [facility] [phone]   [address] [place], [place] [address] [phone]   [facility]  [phone]   40[address] [facility] [phone]   412 [facility] [address] [facility] [phone]

## 2023-03-16 NOTE — PROGRESS NOTES
Diagnoses and all orders for this visit:    Sigmoid diverticulitis  -     Ambulatory Referral to Colorectal Surgery  -     Case request operating room: RESECTION COLON SIGMOID LAPAROSCOPIC; Standing  -     Case request operating room: RESECTION COLON SIGMOID LAPAROSCOPIC    Other orders  -     Fingerstick Glucose (POCT); Standing  -     Nursing Communication Warming Interventions; Standing  -     Nursing Communication Chlorhexidine Cloths; Standing  -     Void on call to OR; Standing  -     Insert peripheral IV; Standing  -     Shower; Standing  -     Place sequential compression device; Standing  -     heparin (porcine) subcutaneous injection 5,000 Units  -     metroNIDAZOLE (FLAGYL) (HIGH DOSE) IVPB 500 mg  -     ceFAZolin (ANCEF) 2,000 mg in dextrose 5 % 100 mL IVPB  -     neomycin (MYCIFRADIN) tablet 1,000 mg  -     metroNIDAZOLE (FLAGYL) tablet 1,000 mg       Sigmoid diverticulitis  Patient presents for follow-up after recent hospitalization  Patient was hospitalized with partial large bowel obstruction  Patient has a history of diverticulitis treated with bowel rest in the past   She has had colonoscopy after episodes of diverticulitis in the past which showed diverticuli  Recent admission showed a sigmoid narrowing with potential small bowel to colon fistula  She notes she has continued abdominal pain  Patient is passing gas and having bowel movements but is noting decreased p o  tolerance  Presents today for follow-up  Based upon her presentation, I suspect she has partial large bowel obstruction secondary to stricture in the setting of recurrent/chronic diverticulitis  We discussed that she may have a different disease process such as inflammatory bowel disease like Crohn's disease or even colorectal cancer  This is felt to be less likely given her history but is a possibility  Given her ongoing symptoms of obstruction, I have recommended laparoscopic possible open sigmoid colectomy    We discussed that a small bowel resection may be required depending on operative findings  We discussed that alternatives would be continued observation or intermittent antibiotics  It is unlikely that her symptoms will resolve given her recent history  I have recommended a low residue diet with MiraLAX on a daily basis until then  If symptoms worsen she will call otherwise we will plan for elective laparoscopic sigmoid colectomy  OR for Colectomy  Risks of surgery, including but not limited to bleeding, infection, anastomotic leak, need for colostomy or enterostomy, injury or dysfunction of the bowel or urinary tract, injury or need for removal of other organs, sexual dysfunction, impotence, bowel obstruction in the future, hernia formation, bowel dysfunction, fecal incontinence, thromboembolic complications (deep vein clots or clots to the lungs), heart failure, heart attack, even death were reviewed  Questions were fully answered  HPI       Mark Alarcon is here today for follow up to diverticulitis  The patient reports an intermittent sharp to dull left lower abdominal pain; having 2 hard bowel movements per week; taking Metamucil daily  She denies any rectal bleeding, nausea/emesis or fever  She has been seen in the hospital multiple times for diverticulitis and was hospitalized on 3/8/2023-3/12 due to Large bowel obstruction  XR abdomen obstruction series on 3/10/2023 showed: Moderate amount of stool retention throughout the colon without radiographic evidence of obstruction  Small bowel enterography on 2/6/2023  CT abdomen and pelvis on 3/8/2023 showed: 1) Sigmoid diverticulosis with wall thickening  There is now partial obstruction of the colon with dilatation throughout that appears to be at the site of the previously described enterocolonic fistula image 2/121  Surgical consultation suggested  2) Stable hepatic steatosis  3) Stable anterior uterine fibroid        She was admitted end of December 2022 for acute diverticulitis and 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  Her most recent colonoscopy on 9/15/2020 with Dr Jesse Calloway showed: Sigmoid diverticula  Sigmoid polyp x2  Tortuous colon  3 year recall  Final Diagnosis   A  Distal sigmoid colon, hot snare and hot biopsy:  - Hyperplastic polyp x 2    - Negative for dysplasia/ carcinoma  Past Medical History:   Diagnosis Date   • Diverticulitis    • Diverticulosis    • Fibroids    • Hypertension      Past Surgical History:   Procedure Laterality Date   • TUBAL LIGATION  1991       Current Outpatient Medications:   •  amLODIPine (NORVASC) 10 mg tablet, Take 10 mg by mouth daily, Disp: , Rfl:   •  hydrochlorothiazide (HYDRODIURIL) 25 mg tablet, Take 25 mg by mouth daily, Disp: , Rfl:   •  Cholecalciferol 125 MCG (5000 UT) capsule, Take 5,000 Units by mouth daily (Patient not taking: Reported on 3/20/2023), Disp: , Rfl:   •  [START ON 4/20/2023] metroNIDAZOLE (FLAGYL) 500 mg tablet, Take 1 tablet at 1:00pm and 1 tablet at 10:00pm the day prior to surgery  Do not start before April 20, 2023 , Disp: 2 tablet, Rfl: 0  •  [START ON 4/20/2023] neomycin (MYCIFRADIN) 500 mg tablet, Take 2 tablets at 1:00pm and 2 tablets at 10:00pm the day prior to surgery  Do not start before April 20, 2023 , Disp: 4 tablet, Rfl: 0  Allergies as of 03/20/2023   • (No Known Allergies)     Review of Systems   Gastrointestinal: Positive for abdominal pain and constipation  There were no vitals filed for this visit  Physical Exam  Constitutional:       Appearance: Normal appearance  HENT:      Head: Normocephalic and atraumatic  Mouth/Throat:      Mouth: Mucous membranes are moist    Eyes:      Extraocular Movements: Extraocular movements intact  Pupils: Pupils are equal, round, and reactive to light  Genitourinary:     Rectum: Normal    Musculoskeletal:         General: Normal range of motion  Skin:     General: Skin is warm and dry  Neurological:      General: No focal deficit present  Mental Status: She is alert and oriented to person, place, and time  Psychiatric:         Mood and Affect: Mood normal          Behavior: Behavior normal          Thought Content:  Thought content normal          Judgment: Judgment normal

## 2023-03-20 ENCOUNTER — OFFICE VISIT (OUTPATIENT)
Age: 53
End: 2023-03-20

## 2023-03-20 VITALS — BODY MASS INDEX: 40.34 KG/M2 | WEIGHT: 187 LBS | HEIGHT: 57 IN

## 2023-03-20 DIAGNOSIS — K57.32 SIGMOID DIVERTICULITIS: ICD-10-CM

## 2023-03-20 DIAGNOSIS — K57.32 SIGMOID DIVERTICULITIS: Primary | ICD-10-CM

## 2023-03-20 RX ORDER — HEPARIN SODIUM 5000 [USP'U]/ML
5000 INJECTION, SOLUTION INTRAVENOUS; SUBCUTANEOUS ONCE
OUTPATIENT
Start: 2023-03-20 | End: 2023-03-20

## 2023-03-20 RX ORDER — NEOMYCIN SULFATE 500 MG/1
TABLET ORAL
Qty: 4 TABLET | Refills: 0 | Status: SHIPPED | OUTPATIENT
Start: 2023-04-20 | End: 2023-04-20

## 2023-03-20 RX ORDER — NEOMYCIN SULFATE 500 MG/1
1000 TABLET ORAL 3 TIMES DAILY
OUTPATIENT
Start: 2023-03-20 | End: 2023-03-21

## 2023-03-20 RX ORDER — METRONIDAZOLE 250 MG/1
1000 TABLET ORAL 3 TIMES DAILY
OUTPATIENT
Start: 2023-03-20 | End: 2023-03-21

## 2023-03-20 RX ORDER — METRONIDAZOLE 500 MG/1
TABLET ORAL
Qty: 2 TABLET | Refills: 0 | Status: SHIPPED | OUTPATIENT
Start: 2023-04-20 | End: 2023-04-20

## 2023-03-20 NOTE — ASSESSMENT & PLAN NOTE
Patient presents for follow-up after recent hospitalization  Patient was hospitalized with partial large bowel obstruction  Patient has a history of diverticulitis treated with bowel rest in the past   She has had colonoscopy after episodes of diverticulitis in the past which showed diverticuli  Recent admission showed a sigmoid narrowing with potential small bowel to colon fistula  She notes she has continued abdominal pain  Patient is passing gas and having bowel movements but is noting decreased p o  tolerance  Presents today for follow-up  Based upon her presentation, I suspect she has partial large bowel obstruction secondary to stricture in the setting of recurrent/chronic diverticulitis  We discussed that she may have a different disease process such as inflammatory bowel disease like Crohn's disease or even colorectal cancer  This is felt to be less likely given her history but is a possibility  Given her ongoing symptoms of obstruction, I have recommended laparoscopic possible open sigmoid colectomy  We discussed that a small bowel resection may be required depending on operative findings  We discussed that alternatives would be continued observation or intermittent antibiotics  It is unlikely that her symptoms will resolve given her recent history  I have recommended a low residue diet with MiraLAX on a daily basis until then  If symptoms worsen she will call otherwise we will plan for elective laparoscopic sigmoid colectomy  OR for Colectomy   Risks of surgery, including but not limited to bleeding, infection, anastomotic leak, need for colostomy or enterostomy, injury or dysfunction of the bowel or urinary tract, injury or need for removal of other organs, sexual dysfunction, impotence, bowel obstruction in the future, hernia formation, bowel dysfunction, fecal incontinence, thromboembolic complications (deep vein clots or clots to the lungs), heart failure, heart attack, even death were reviewed  Questions were fully answered

## 2023-03-20 NOTE — TELEPHONE ENCOUNTER
Patient scheduled for surgery 4/26/2023 at [facility]   Instructions and PAT's gone over with and handed to the patient      Pre op meds routed to Dr Mercedes Foss

## 2023-03-20 NOTE — LETTER
[place] [id number]        ------------------------------------------------------------------------------------------------------------    3/20/2023    Dear Jose Jaimes,    Your surgery is scheduled for: 4/26/2023   The hospital will call the evening prior to your surgery with your expected arrival time  Location:St  [address] [facility] [address] [facility] [address] , [place] [address]    CHECK LIST PRIOR TO INPATIENT SURGERY  It is your responsibility to obtain any/all referrals needed for your surgery if required by your insurance  Our office will contact you to discuss your insurance coverage for this procedure  Special instructions required if you are taking any blood thinners  Please verify with the prescribing physician  Examples include Coumadin, Plavix, Xarelto, Eliquis, Pradaxa, etc     Please check with your family physician if you are taking the following medications: Aspirin or any Aspirin containing medication, Gingko biloba, Ginseng, Feverfew, and/or St  Chetan’s Wort  We suggest stopping these for 3 days  The night before and the day of your surgery, wash from your neck to groin with chlorhexidine soap  This soap is available at most retail pharmacies under such brand names as Hibiclens, Endure or Aplicare  Pre-admission testing Required: YES x NO    Other Clearances/ Additional Testing: NONE    BOWEL PREPARATION REQUIRED: YES x NO   If yes, start date: 4/25/2023  Please see attached bowel preparation instructions  NOTHING TO EAT OR DRINK AFTER MIDNIGHT PRIOR TO SURGERY  Please do not hesitate to call our office with any questions regarding your surgery                     MIRALAX/GATORADE SURGERY PREPARATION INSTRUCTIONS    Purchase:   (1) 238g bottle of MiraLax or Glycolax - no prescription needed   4 Dulcolax Laxative Tablets - no prescription needed   64 oz  bottle of Gatorade (NOT RED), Crystal Light, or another clear liquid of  choice  **REMEMBER** A prescription for antibiotics has been called into your pharmacy for  prior to your surgery  One Day Prior to Surgery  • Have a normal breakfast, light lunch, and clear liquids thereafter  It is important that you drink plenty of clear liquids throughout the day to prevent dehydration  CLEAR LIQUIDS INCLUDE:  Water, Clear Fruit Juice (Apple, Cranberry, Grape), Black Coffee, Tea, Ginger Ale, Gatorade, Kofi-Aid, Hi-C, plain Jello, Ice Pops, Clear Broth or Bouillon, Crystal Light, Lemonade, Soda (regular or diet)  No Milk Products! • At 1:00 pm, take (4) Dulcolax Tablets with 8 oz  of water  Swallow the tablets whole with a full glass of water  The package may direct you not to exceed (2) tablets at any time but for the purpose of this examination, you should take (4)  • At 1:00 pm, take your first dose of antibiotic as prescribed  • At 1:00 pm, Mix the 238g bottle of MiraLax in 64 oz  of Gatorade and shake the solution until the MiraLax is dissolved  Drink 8 oz  glassfuls at your own pace  It may take 3-4 hours to drink all the solution  • At 10:00 pm, take your last dose of antibiotic as prescribed  • REMEMBER TO STAY CLOSE TO TOILET FACILITIES  The Day of Surgery  • Take nothing by mouth until after surgery

## 2023-04-17 NOTE — PRE-PROCEDURE INSTRUCTIONS
Pre-Surgery Instructions:   Medication Instructions    amLODIPine (NORVASC) 10 mg tablet Take day of surgery   hydrochlorothiazide (HYDRODIURIL) 25 mg tablet Hold day of surgery   [START ON 4/20/2023] metroNIDAZOLE (FLAGYL) 500 mg tablet Instructions provided by MD day prior surgery    [START ON 4/20/2023] neomycin (MYCIFRADIN) 500 mg tablet Instructions provided by MD day prior surgery   Have you had / have a sore throat? No  Have you had / have a cough less than 1 week? No  Have you had / have a fever greater than 100 0 - 100  4? No  Are you experiencing any shortness of breath? No    Review with patient via phone medications and showering instructions  Instructed to avoid all ASA and OTC Vit/Supp 1 week prior to surgery and to avoid NSAIDs 3 days prior to surgery per anesthesia instructions  Tylenol ok to take prn  [facility] call with surgery schedule time, nothing eat or drink after midnight  Verbalized understanding  Aware bowel prep and antibiotic order by surgeon  Advise Refer to Weight Management Program not interested

## 2023-04-26 ENCOUNTER — ANESTHESIA EVENT (OUTPATIENT)
Dept: PERIOP | Facility: HOSPITAL | Age: 53
End: 2023-04-26

## 2023-04-26 ENCOUNTER — HOSPITAL ENCOUNTER (INPATIENT)
Facility: HOSPITAL | Age: 53
LOS: 9 days | Discharge: HOME HEALTH | End: 2023-05-05
Attending: COLON & RECTAL SURGERY | Admitting: COLON & RECTAL SURGERY

## 2023-04-26 ENCOUNTER — ANESTHESIA (OUTPATIENT)
Dept: PERIOP | Facility: HOSPITAL | Age: 53
End: 2023-04-26

## 2023-04-26 DIAGNOSIS — K57.32 SIGMOID DIVERTICULITIS: ICD-10-CM

## 2023-04-26 DIAGNOSIS — K56.609 LARGE BOWEL OBSTRUCTION (HCC): ICD-10-CM

## 2023-04-26 DIAGNOSIS — R10.10 PAIN OF UPPER ABDOMEN: Primary | ICD-10-CM

## 2023-04-26 LAB
ANION GAP SERPL CALCULATED.3IONS-SCNC: 2 MMOL/L (ref 4–13)
BUN SERPL-MCNC: 7 MG/DL (ref 5–25)
CALCIUM SERPL-MCNC: 7.8 MG/DL (ref 8.3–10.1)
CHLORIDE SERPL-SCNC: 113 MMOL/L (ref 96–108)
CO2 SERPL-SCNC: 25 MMOL/L (ref 21–32)
CREAT SERPL-MCNC: 0.76 MG/DL (ref 0.6–1.3)
ERYTHROCYTE [DISTWIDTH] IN BLOOD BY AUTOMATED COUNT: 13.1 % (ref 11.6–15.1)
EXT PREGNANCY TEST URINE: NEGATIVE
EXT. CONTROL: NORMAL
GFR SERPL CREATININE-BSD FRML MDRD: 89 ML/MIN/1.73SQ M
GLUCOSE SERPL-MCNC: 125 MG/DL (ref 65–140)
GLUCOSE SERPL-MCNC: 127 MG/DL (ref 65–140)
GLUCOSE SERPL-MCNC: 143 MG/DL (ref 65–140)
HCT VFR BLD AUTO: 42.6 % (ref 34.8–46.1)
HGB BLD-MCNC: 13.6 G/DL (ref 11.5–15.4)
MCH RBC QN AUTO: 28.8 PG (ref 26.8–34.3)
MCHC RBC AUTO-ENTMCNC: 31.9 G/DL (ref 31.4–37.4)
MCV RBC AUTO: 90 FL (ref 82–98)
PLATELET # BLD AUTO: 264 THOUSANDS/UL (ref 149–390)
PMV BLD AUTO: 10.2 FL (ref 8.9–12.7)
POTASSIUM SERPL-SCNC: 3.6 MMOL/L (ref 3.5–5.3)
RBC # BLD AUTO: 4.72 MILLION/UL (ref 3.81–5.12)
SODIUM SERPL-SCNC: 140 MMOL/L (ref 135–147)
WBC # BLD AUTO: 9.77 THOUSAND/UL (ref 4.31–10.16)

## 2023-04-26 PROCEDURE — 0DTN4ZZ RESECTION OF SIGMOID COLON, PERCUTANEOUS ENDOSCOPIC APPROACH: ICD-10-PCS | Performed by: COLON & RECTAL SURGERY

## 2023-04-26 PROCEDURE — 0DB84ZZ EXCISION OF SMALL INTESTINE, PERCUTANEOUS ENDOSCOPIC APPROACH: ICD-10-PCS | Performed by: COLON & RECTAL SURGERY

## 2023-04-26 PROCEDURE — 0D1L4Z4 BYPASS TRANSVERSE COLON TO CUTANEOUS, PERCUTANEOUS ENDOSCOPIC APPROACH: ICD-10-PCS | Performed by: COLON & RECTAL SURGERY

## 2023-04-26 PROCEDURE — 0DN84ZZ RELEASE SMALL INTESTINE, PERCUTANEOUS ENDOSCOPIC APPROACH: ICD-10-PCS | Performed by: COLON & RECTAL SURGERY

## 2023-04-26 RX ORDER — ONDANSETRON 2 MG/ML
4 INJECTION INTRAMUSCULAR; INTRAVENOUS ONCE AS NEEDED
Status: DISCONTINUED | OUTPATIENT
Start: 2023-04-26 | End: 2023-04-26 | Stop reason: HOSPADM

## 2023-04-26 RX ORDER — ALBUMIN, HUMAN INJ 5% 5 %
SOLUTION INTRAVENOUS CONTINUOUS PRN
Status: DISCONTINUED | OUTPATIENT
Start: 2023-04-26 | End: 2023-04-26

## 2023-04-26 RX ORDER — ONDANSETRON 2 MG/ML
4 INJECTION INTRAMUSCULAR; INTRAVENOUS EVERY 6 HOURS PRN
Status: DISCONTINUED | OUTPATIENT
Start: 2023-04-26 | End: 2023-05-05 | Stop reason: HOSPADM

## 2023-04-26 RX ORDER — ROCURONIUM BROMIDE 10 MG/ML
INJECTION, SOLUTION INTRAVENOUS AS NEEDED
Status: DISCONTINUED | OUTPATIENT
Start: 2023-04-26 | End: 2023-04-26

## 2023-04-26 RX ORDER — ALBUTEROL SULFATE 90 UG/1
AEROSOL, METERED RESPIRATORY (INHALATION) AS NEEDED
Status: DISCONTINUED | OUTPATIENT
Start: 2023-04-26 | End: 2023-04-26

## 2023-04-26 RX ORDER — DEXAMETHASONE SODIUM PHOSPHATE 10 MG/ML
INJECTION, SOLUTION INTRAMUSCULAR; INTRAVENOUS AS NEEDED
Status: DISCONTINUED | OUTPATIENT
Start: 2023-04-26 | End: 2023-04-26

## 2023-04-26 RX ORDER — HYDROMORPHONE HCL/PF 1 MG/ML
SYRINGE (ML) INJECTION AS NEEDED
Status: DISCONTINUED | OUTPATIENT
Start: 2023-04-26 | End: 2023-04-26

## 2023-04-26 RX ORDER — CALCIUM GLUCONATE 20 MG/ML
2 INJECTION, SOLUTION INTRAVENOUS ONCE
Status: COMPLETED | OUTPATIENT
Start: 2023-04-26 | End: 2023-04-26

## 2023-04-26 RX ORDER — HEPARIN SODIUM 5000 [USP'U]/ML
5000 INJECTION, SOLUTION INTRAVENOUS; SUBCUTANEOUS ONCE
Status: COMPLETED | OUTPATIENT
Start: 2023-04-26 | End: 2023-04-26

## 2023-04-26 RX ORDER — SODIUM CHLORIDE, SODIUM LACTATE, POTASSIUM CHLORIDE, CALCIUM CHLORIDE 600; 310; 30; 20 MG/100ML; MG/100ML; MG/100ML; MG/100ML
125 INJECTION, SOLUTION INTRAVENOUS CONTINUOUS
Status: DISCONTINUED | OUTPATIENT
Start: 2023-04-26 | End: 2023-04-27

## 2023-04-26 RX ORDER — MAGNESIUM HYDROXIDE 1200 MG/15ML
LIQUID ORAL AS NEEDED
Status: DISCONTINUED | OUTPATIENT
Start: 2023-04-26 | End: 2023-04-26 | Stop reason: HOSPADM

## 2023-04-26 RX ORDER — HEPARIN SODIUM 5000 [USP'U]/ML
7500 INJECTION, SOLUTION INTRAVENOUS; SUBCUTANEOUS EVERY 8 HOURS SCHEDULED
Status: DISCONTINUED | OUTPATIENT
Start: 2023-04-26 | End: 2023-05-05 | Stop reason: HOSPADM

## 2023-04-26 RX ORDER — SUCCINYLCHOLINE/SOD CL,ISO/PF 100 MG/5ML
SYRINGE (ML) INTRAVENOUS AS NEEDED
Status: DISCONTINUED | OUTPATIENT
Start: 2023-04-26 | End: 2023-04-26

## 2023-04-26 RX ORDER — LIDOCAINE HYDROCHLORIDE 10 MG/ML
INJECTION, SOLUTION EPIDURAL; INFILTRATION; INTRACAUDAL; PERINEURAL AS NEEDED
Status: DISCONTINUED | OUTPATIENT
Start: 2023-04-26 | End: 2023-04-26

## 2023-04-26 RX ORDER — POTASSIUM CHLORIDE 14.9 MG/ML
20 INJECTION INTRAVENOUS 3 TIMES DAILY
Status: COMPLETED | OUTPATIENT
Start: 2023-04-26 | End: 2023-04-27

## 2023-04-26 RX ORDER — HYDROMORPHONE HCL/PF 1 MG/ML
0.5 SYRINGE (ML) INJECTION
Status: DISCONTINUED | OUTPATIENT
Start: 2023-04-26 | End: 2023-04-26 | Stop reason: HOSPADM

## 2023-04-26 RX ORDER — KETAMINE HCL IN NACL, ISO-OSM 100MG/10ML
SYRINGE (ML) INJECTION AS NEEDED
Status: DISCONTINUED | OUTPATIENT
Start: 2023-04-26 | End: 2023-04-26

## 2023-04-26 RX ORDER — METRONIDAZOLE 500 MG/1
1000 TABLET ORAL 3 TIMES DAILY
Status: DISCONTINUED | OUTPATIENT
Start: 2023-04-26 | End: 2023-04-26

## 2023-04-26 RX ORDER — HYDROMORPHONE HCL/PF 1 MG/ML
0.5 SYRINGE (ML) INJECTION
Status: DISCONTINUED | OUTPATIENT
Start: 2023-04-26 | End: 2023-05-05 | Stop reason: HOSPADM

## 2023-04-26 RX ORDER — SODIUM CHLORIDE, SODIUM LACTATE, POTASSIUM CHLORIDE, CALCIUM CHLORIDE 600; 310; 30; 20 MG/100ML; MG/100ML; MG/100ML; MG/100ML
100 INJECTION, SOLUTION INTRAVENOUS CONTINUOUS
Status: CANCELLED | OUTPATIENT
Start: 2023-04-26

## 2023-04-26 RX ORDER — METRONIDAZOLE 500 MG/100ML
500 INJECTION, SOLUTION INTRAVENOUS ONCE
Status: COMPLETED | OUTPATIENT
Start: 2023-04-26 | End: 2023-04-26

## 2023-04-26 RX ORDER — FENTANYL CITRATE/PF 50 MCG/ML
50 SYRINGE (ML) INJECTION
Status: DISCONTINUED | OUTPATIENT
Start: 2023-04-26 | End: 2023-04-26 | Stop reason: HOSPADM

## 2023-04-26 RX ORDER — MIDAZOLAM HYDROCHLORIDE 2 MG/2ML
INJECTION, SOLUTION INTRAMUSCULAR; INTRAVENOUS AS NEEDED
Status: DISCONTINUED | OUTPATIENT
Start: 2023-04-26 | End: 2023-04-26

## 2023-04-26 RX ORDER — AMLODIPINE BESYLATE 10 MG/1
10 TABLET ORAL DAILY
Status: DISCONTINUED | OUTPATIENT
Start: 2023-04-27 | End: 2023-05-05 | Stop reason: HOSPADM

## 2023-04-26 RX ORDER — ONDANSETRON 2 MG/ML
INJECTION INTRAMUSCULAR; INTRAVENOUS AS NEEDED
Status: DISCONTINUED | OUTPATIENT
Start: 2023-04-26 | End: 2023-04-26

## 2023-04-26 RX ORDER — ALBUTEROL SULFATE 2.5 MG/3ML
2.5 SOLUTION RESPIRATORY (INHALATION) ONCE AS NEEDED
Status: DISCONTINUED | OUTPATIENT
Start: 2023-04-26 | End: 2023-04-26 | Stop reason: HOSPADM

## 2023-04-26 RX ORDER — SODIUM CHLORIDE, SODIUM LACTATE, POTASSIUM CHLORIDE, CALCIUM CHLORIDE 600; 310; 30; 20 MG/100ML; MG/100ML; MG/100ML; MG/100ML
20 INJECTION, SOLUTION INTRAVENOUS CONTINUOUS
Status: DISCONTINUED | OUTPATIENT
Start: 2023-04-26 | End: 2023-04-26

## 2023-04-26 RX ORDER — PROPOFOL 10 MG/ML
INJECTION, EMULSION INTRAVENOUS AS NEEDED
Status: DISCONTINUED | OUTPATIENT
Start: 2023-04-26 | End: 2023-04-26

## 2023-04-26 RX ORDER — FENTANYL CITRATE 50 UG/ML
INJECTION, SOLUTION INTRAMUSCULAR; INTRAVENOUS AS NEEDED
Status: DISCONTINUED | OUTPATIENT
Start: 2023-04-26 | End: 2023-04-26

## 2023-04-26 RX ORDER — CEFAZOLIN SODIUM 2 G/50ML
2000 SOLUTION INTRAVENOUS ONCE
Status: COMPLETED | OUTPATIENT
Start: 2023-04-26 | End: 2023-04-26

## 2023-04-26 RX ORDER — NEOMYCIN SULFATE 500 MG/1
1000 TABLET ORAL 3 TIMES DAILY
Status: DISCONTINUED | OUTPATIENT
Start: 2023-04-26 | End: 2023-04-26

## 2023-04-26 RX ORDER — SODIUM CHLORIDE 9 MG/ML
INJECTION, SOLUTION INTRAVENOUS CONTINUOUS PRN
Status: DISCONTINUED | OUTPATIENT
Start: 2023-04-26 | End: 2023-04-26

## 2023-04-26 RX ADMIN — ALBUMIN (HUMAN): 12.5 INJECTION, SOLUTION INTRAVENOUS at 15:11

## 2023-04-26 RX ADMIN — HYDROMORPHONE HYDROCHLORIDE 1 MG: 1 INJECTION, SOLUTION INTRAMUSCULAR; INTRAVENOUS; SUBCUTANEOUS at 13:30

## 2023-04-26 RX ADMIN — ROCURONIUM BROMIDE 10 MG: 50 INJECTION INTRAVENOUS at 15:02

## 2023-04-26 RX ADMIN — DEXAMETHASONE SODIUM PHOSPHATE 10 MG: 10 INJECTION, SOLUTION INTRAMUSCULAR; INTRAVENOUS at 12:11

## 2023-04-26 RX ADMIN — ALBUMIN (HUMAN): 12.5 INJECTION, SOLUTION INTRAVENOUS at 15:21

## 2023-04-26 RX ADMIN — FENTANYL CITRATE 100 MCG: 50 INJECTION, SOLUTION INTRAMUSCULAR; INTRAVENOUS at 12:09

## 2023-04-26 RX ADMIN — HYDROMORPHONE HYDROCHLORIDE: 10 INJECTION INTRAMUSCULAR; INTRAVENOUS; SUBCUTANEOUS at 18:00

## 2023-04-26 RX ADMIN — CALCIUM GLUCONATE 2 G: 20 INJECTION, SOLUTION INTRAVENOUS at 20:20

## 2023-04-26 RX ADMIN — ALBUTEROL SULFATE 2 PUFF: 90 AEROSOL, METERED RESPIRATORY (INHALATION) at 14:04

## 2023-04-26 RX ADMIN — FENTANYL CITRATE 50 MCG: 50 INJECTION, SOLUTION INTRAMUSCULAR; INTRAVENOUS at 12:46

## 2023-04-26 RX ADMIN — CEFAZOLIN SODIUM 2000 MG: 2 SOLUTION INTRAVENOUS at 12:15

## 2023-04-26 RX ADMIN — SODIUM CHLORIDE, SODIUM LACTATE, POTASSIUM CHLORIDE, AND CALCIUM CHLORIDE 1000 ML: .6; .31; .03; .02 INJECTION, SOLUTION INTRAVENOUS at 21:45

## 2023-04-26 RX ADMIN — ALBUTEROL SULFATE 4 PUFF: 90 AEROSOL, METERED RESPIRATORY (INHALATION) at 16:58

## 2023-04-26 RX ADMIN — HEPARIN SODIUM 7500 UNITS: 5000 INJECTION INTRAVENOUS; SUBCUTANEOUS at 21:47

## 2023-04-26 RX ADMIN — METRONIDAZOLE: 500 INJECTION, SOLUTION INTRAVENOUS at 12:15

## 2023-04-26 RX ADMIN — SODIUM CHLORIDE, SODIUM LACTATE, POTASSIUM CHLORIDE, AND CALCIUM CHLORIDE: .6; .31; .03; .02 INJECTION, SOLUTION INTRAVENOUS at 14:45

## 2023-04-26 RX ADMIN — ROCURONIUM BROMIDE 20 MG: 50 INJECTION INTRAVENOUS at 13:05

## 2023-04-26 RX ADMIN — FENTANYL CITRATE 50 MCG: 50 INJECTION INTRAMUSCULAR; INTRAVENOUS at 17:44

## 2023-04-26 RX ADMIN — MIDAZOLAM 2 MG: 1 INJECTION INTRAMUSCULAR; INTRAVENOUS at 12:05

## 2023-04-26 RX ADMIN — PROPOFOL 50 MG: 10 INJECTION, EMULSION INTRAVENOUS at 14:04

## 2023-04-26 RX ADMIN — SODIUM CHLORIDE, SODIUM LACTATE, POTASSIUM CHLORIDE, AND CALCIUM CHLORIDE 125 ML/HR: .6; .31; .03; .02 INJECTION, SOLUTION INTRAVENOUS at 23:06

## 2023-04-26 RX ADMIN — CEFAZOLIN SODIUM 2000 MG: 2 SOLUTION INTRAVENOUS at 16:07

## 2023-04-26 RX ADMIN — ROCURONIUM BROMIDE 10 MG: 50 INJECTION INTRAVENOUS at 13:44

## 2023-04-26 RX ADMIN — HYDROMORPHONE HYDROCHLORIDE 0.5 MG: 1 INJECTION, SOLUTION INTRAMUSCULAR; INTRAVENOUS; SUBCUTANEOUS at 16:54

## 2023-04-26 RX ADMIN — POTASSIUM CHLORIDE 20 MEQ: 14.9 INJECTION, SOLUTION INTRAVENOUS at 21:47

## 2023-04-26 RX ADMIN — FENTANYL CITRATE 50 MCG: 50 INJECTION INTRAMUSCULAR; INTRAVENOUS at 17:24

## 2023-04-26 RX ADMIN — SODIUM CHLORIDE: 0.9 INJECTION, SOLUTION INTRAVENOUS at 14:45

## 2023-04-26 RX ADMIN — HEPARIN SODIUM 5000 UNITS: 5000 INJECTION INTRAVENOUS; SUBCUTANEOUS at 11:13

## 2023-04-26 RX ADMIN — Medication 100 MG: at 12:09

## 2023-04-26 RX ADMIN — SODIUM CHLORIDE: 0.9 INJECTION, SOLUTION INTRAVENOUS at 12:12

## 2023-04-26 RX ADMIN — HYDROMORPHONE HYDROCHLORIDE 0.5 MG: 1 INJECTION, SOLUTION INTRAMUSCULAR; INTRAVENOUS; SUBCUTANEOUS at 16:02

## 2023-04-26 RX ADMIN — Medication 30 MG: at 13:44

## 2023-04-26 RX ADMIN — SODIUM CHLORIDE, SODIUM LACTATE, POTASSIUM CHLORIDE, AND CALCIUM CHLORIDE 20 ML/HR: .6; .31; .03; .02 INJECTION, SOLUTION INTRAVENOUS at 09:57

## 2023-04-26 RX ADMIN — PROPOFOL 150 MG: 10 INJECTION, EMULSION INTRAVENOUS at 12:09

## 2023-04-26 RX ADMIN — FENTANYL CITRATE 50 MCG: 50 INJECTION, SOLUTION INTRAMUSCULAR; INTRAVENOUS at 12:53

## 2023-04-26 RX ADMIN — ROCURONIUM BROMIDE 20 MG: 50 INJECTION INTRAVENOUS at 14:04

## 2023-04-26 RX ADMIN — ALBUTEROL SULFATE 5 PUFF: 90 AEROSOL, METERED RESPIRATORY (INHALATION) at 16:46

## 2023-04-26 RX ADMIN — SUGAMMADEX 200 MG: 100 INJECTION, SOLUTION INTRAVENOUS at 16:47

## 2023-04-26 RX ADMIN — ROCURONIUM BROMIDE 50 MG: 50 INJECTION INTRAVENOUS at 12:15

## 2023-04-26 RX ADMIN — Medication 20 MG: at 14:19

## 2023-04-26 RX ADMIN — LIDOCAINE HYDROCHLORIDE 50 MG: 10 INJECTION, SOLUTION EPIDURAL; INFILTRATION; INTRACAUDAL; PERINEURAL at 12:09

## 2023-04-26 RX ADMIN — ONDANSETRON 4 MG: 2 INJECTION INTRAMUSCULAR; INTRAVENOUS at 16:42

## 2023-04-26 RX ADMIN — FENTANYL CITRATE 50 MCG: 50 INJECTION INTRAMUSCULAR; INTRAVENOUS at 18:37

## 2023-04-26 NOTE — PERIOPERATIVE NURSING NOTE
Attempted to call report to P8, waiting on phone call back  Will transfer patient once report is given  Pt states she has no pain right now  Educated pt on importance of PCA pump  Verbalizes understanding  Ostomy already put out 250cc brown liquid stool, Eliana Berry made aware  Will continue to monitor

## 2023-04-26 NOTE — ANESTHESIA POSTPROCEDURE EVALUATION
Post-Op Assessment Note    CV Status:  Stable  Pain Score: 0    Pain management: adequate     Mental Status:  Awake   Hydration Status:  Stable   PONV Controlled:  None   Airway Patency:  Patent      Post Op Vitals Reviewed: Yes      Staff: CRNA         No notable events documented      /76 (04/26/23 1714)    Temp 98 3 °F (36 8 °C) (04/26/23 1714)    Pulse (!) 114 (04/26/23 1714)   Resp 22 (04/26/23 1714)    SpO2 96 % (04/26/23 1714)

## 2023-04-26 NOTE — H&P
History and Physical   Colon and Rectal Surgery   Tevin Bright 48 y o  female MRN: [id number]  Unit/Bed#: OR [place] Encounter: [id number]  04/26/23   @NOW    No chief complaint on file  History of Present Illness   HPI:  Tevin Bright is a 48 y o  female who presents for surgical treatment of sigmoid stricture likely secondary to diverticulitis        Historical Information   Past Medical History:   Diagnosis Date    Diverticulitis     Diverticulosis     Fibroids     Hypertension      Past Surgical History:   Procedure Laterality Date    COLONOSCOPY      TUBAL LIGATION  1991       Meds/Allergies     Medications Prior to Admission   Medication    amLODIPine (NORVASC) 10 mg tablet    hydrochlorothiazide (HYDRODIURIL) 25 mg tablet         Current Facility-Administered Medications:     metroNIDAZOLE (FLAGYL) IVPB (premix) 500 mg 100 mL, 500 mg, Intravenous, Once **AND** ceFAZolin (ANCEF) IVPB (premix in dextrose) 2,000 mg 50 mL, 2,000 mg, Intravenous, Once, Campbell Gonzales MD    heparin (porcine) subcutaneous injection 5,000 Units, 5,000 Units, Subcutaneous, Once, Campbell Gonzales MD    [facility]) tablet 1,000 mg, 1,000 mg, Oral, TID **AND** metroNIDAZOLE (FLAGYL) tablet 1,000 mg, 1,000 mg, Oral, TID, Campbell Gonzales MD    No Known Allergies      Social History   Social History     Substance and Sexual Activity   Alcohol Use Not Currently    Alcohol/week: 3 0 standard drinks    Types: 3 Shots of liquor per week    Comment: socially     Social History     Substance and Sexual Activity   Drug Use Never     Social History     Tobacco Use   Smoking Status Every Day    Types: Cigars   Smokeless Tobacco Never         Family History:   Family History   Problem Relation Age of Onset    Diverticulitis Mother     No Known Problems Father          Objective     Current Vitals:   Blood Pressure: 149/89 (04/26/23 0918)  Pulse: 82 (04/26/23 0918)  Temperature: (!) 96 4 °F (35 8 °C) (04/26/23 0918)  Temp Source: Temporal (04/26/23 4110)  Respirations: 18 (04/26/23 0918)  SpO2: 100 % (04/26/23 0918)  No intake or output data in the 24 hours ending 04/26/23 0935    Physical Exam:  General:  Resting comfortably in bed   Eyes:Sclera anicteric  ENT: Trachea midline  Pulm:  Symmetric chest raise  No respiratory Distress  CV:  Regular on monitor  Abdomen:  Soft NT ND  Extremities:  No clubbing/ cyanosis/ edema    Lab Results: I have personally reviewed pertinent lab results  Imaging: I have personally reviewed pertinent reports  ASSESSMENT:  Deandre Gutierrez is a 48 y o  female who presents for surgical treatment of symptomatic diverticular stricture  Plan is for laparoscopic possible open sigmoid colectomy, possible stoma  OR for Colectomy  Risks of surgery, including but not limited to bleeding, infection, anastomotic leak, need for colostomy or enterostomy, injury or dysfunction of the bowel or urinary tract, injury or need for removal of other organs, sexual dysfunction, impotence, bowel obstruction in the future, hernia formation, bowel dysfunction, fecal incontinence, thromboembolic complications (deep vein clots or clots to the lungs), heart failure, heart attack, even death were reviewed  Questions were fully answered

## 2023-04-26 NOTE — ANESTHESIA PREPROCEDURE EVALUATION
Procedure:  RESECTION COLON SIGMOID LAPAROSCOPIC (Abdomen)    Relevant Problems   CARDIO   (+) Essential hypertension      GI/HEPATIC   (+) Large bowel obstruction (HCC)        Physical Exam    Airway    Mallampati score: I  TM Distance: >3 FB  Neck ROM: full     Dental   No notable dental hx     Cardiovascular  Cardiovascular exam normal    Pulmonary  Pulmonary exam normal     Other Findings        Anesthesia Plan  ASA Score- 2     Anesthesia Type- general with ASA Monitors  Additional Monitors:   Airway Plan: ETT  Plan Factors-Exercise tolerance (METS): >4 METS  Chart reviewed  EKG reviewed  Imaging results reviewed  Existing labs reviewed  Patient summary reviewed  Patient is a current smoker  Patient instructed to abstain from smoking on day of procedure  Patient did not smoke on day of surgery  Induction- intravenous  Postoperative Plan- Plan for postoperative opioid use  Planned trial extubation    Informed Consent- Anesthetic plan and risks discussed with patient  I personally reviewed this patient with the CRNA  Discussed and agreed on the Anesthesia Plan with the CRNA  Carlyle Elliott

## 2023-04-26 NOTE — OP NOTE
OPERATIVE REPORT  PATIENT NAME: Adam Tomlinson    :  1970  MRN: [id number]  Pt Location: BE OR ROOM 07    SURGERY DATE: 2023    Surgeon(s) and Role:     * Jennifer Rincon MD - Primary     * Ethel Odonnell MD - Assisting    Preop Diagnosis:  Sigmoid diverticulitis [K57 32]    Post-Op Diagnosis Codes:     * Sigmoid diverticulitis [K57 32]    Procedure(s):  LAPAROSCOPY  EXTENSIVE LYSIS ADHESIONS  MOBILIZATION SPLENIC FLEXURE  RESECTION COLON SIGMOID LAPAROSCOPIC  RESECTION SMALL BOWEL  X 2  COLOSTOMY END    Specimen(s):  ID Type Source Tests Collected by Time Destination   1 : SIGMOIDCOLECTOMY Tissue Large Intestine, Sigmoid Colon TISSUE EXAM Jennifer Rincon MD 2023 1256    2 : SEGMENT SMALL BOWEL Tissue Small Bowel, NOS TISSUE EXAM Jennifer Rincon MD 2023 1518        Estimated Blood Loss:   Minimal    Drains:  Closed/Suction Drain Right RLQ Bulb (Active)   Number of days: 0       Colostomy Transverse LUQ (Active)   Number of days: 0       Urethral Catheter Non-latex 16 Fr  (Active)   Number of days: 0       Anesthesia Type:   General    Operative Indications:  Sigmoid diverticulitis [K57 32]      Operative Findings:  Sigmoid phlegmon with multiple loops of small bowel adhesions with fistulous connections  Signs of upstream dilation with large formed fecal burden    Complications:   None    Procedure and Technique:  After preoperative identification in the preoperative holding area the patient was taken the operating room placed in the supine position  After satisfactory induction of general endotracheal anesthesia appropriate placement of anesthesia monitors, patient was placed in a modified low lithotomy position  Patient was secured to the operating room table  Patient was prepped and draped in usual sterile fashion  Antibiotics were given prior to incision  Time-out was undertaken procedure begun  Supraumbilical incision was made    This taken down to level the fascia  5 mm trocar was placed  Abdomen was entered  Abdomen is insufflated 15 mmHg  Abdomen is inspected for signs of injury upon entry  None were seen  Exploration was performed  Patient was noted to have significant colonic distention leading down to a clear transition point at the sigmoid colon  There were multiple loops of small bowel that were densely adherent to this  This would be similar to what was seen on patient's preoperative imaging with CT enterography showing multiple fistulous connections  Trocars were placed in the right upper right and lower quadrant  Lower midline incision was placed for HandPort  We prepared for mobilization  First the small bowel was sharply dissected free off of the sigmoid colon and sigmoid colon mesentery  There were 2 separate areas that were noted to have full-thickness injury consistent with patient's known history of fistula  This would be considered not preventable given its pre-existing nature  The small bowel injuries were tagged using 3-0 Vicryl for eventual resection  With the small bowel dissected free, mesentery was mobilized in a medial to lateral dissection  Retrorectal space was entered  Left ureter was identified  Lateral to medial dissection was performed  The entire descending colon was mobilized  Left ureter was identified  We able to dissect free down to the pelvis to soft supple rectum free of diverticular disease  Inferior mesenteric artery was encircled  After again identifying the left ureter this was transected  Medial mobilization was continued cephalad to the duodenum  Transverse colon was mobilized off of the greater omentum  The entire colon was noted to be tensely distended with signs of venous congestion  The left colon was completely mobilized with the plan for evaluation for anastomosis  Mesorectum was cleared  Through the HandPort a contour stapler was placed in the rectum was transected    We then brought the colon out through the incision with plans to transect  At the very least 10 cm proximal to the area of diverticulitis the descending colon was cleared of its mesentery and prepared for transection  This was done sharply  The colon itself was noted to be quite thickened with significant venous congestion at this level  While protecting the wound, there was a large amount of solid and semisolid stool within the colon  We attempted to irrigate this clear in order to perform anastomosis  Unfortunately this ended up clogging multiple suction devices  The mucosa itself was injected but viable  Unfortunately due to the poor prep and very thickened colon,  I did not feel it was safe to perform anastomosis  As such we stapled across the end and left this in the abdomen with plan for colostomy  We then ran the entire small bowel  The 2 areas that we had previously marked were identified  It was decided to perform resection at these areas as they were quite inflamed to ensure good healing  Windows were made beneath the small bowel approximately 5 cm from each fistula site  Small bowel was transected using a single firing of the EUSEBIO 100  Mesentery was divided using the Enseal sealing device  Ends were aligned and a common channel firing of the EUSEBIO 100 was used to complete the anastomosis  And was closed using a final firing of the EUSEBIO 100  All staple lines and apex were reinforced  The mesenteric defect was closed using 3-0 Vicryl as well  This was repeated at the second site  The small bowel was entirely run from proximal to distal   There is 1 small area of deserosalization that was repaired using 3-0 Vicryl  Otherwise there were no other small bowel injuries  As such we prepared to close  Left upper quadrant was noted to have a thinner area of her abdominal wall and a circular incision was made  This taken down to level the fascia    Muscle-splitting incision was made and the descending colon was brought out through this incision for planned colostomy  Abdomen is reinsufflated  The colon was noted to have no twist   Hemostasis was noted to be excellent 10 flat Michael-Negron drain was placed through the right lower quadrant port and placed into the pelvis  The staple line of the rectum was marked using two 2-0 Prolene sutures at either corner  We now prepared to close  Gown and gloves were changed  Clean instruments were used  All sponge and needle counts were correct  RF wand was performed and no retained sponges were noted  Fascia was closed using interrupted #1 single-stranded PDS  Skin was closed using 4-0 Monocryl and Exofin  Drain was secured using 3-0 nylon  The colostomy was then matured using 3-0 Vicryl suture  Stoma appliance was placed  Patient was awakened from anesthesia and transferred to recovery room in stable condition  Gown and gloves were changed  Lower midline incision     I was present for the entire procedure      Patient Disposition:  PACU     This procedure was not performed to treat colon cancer through resection      SIGNATURE: Jessica Hernandez MD  DATE: April 26, 2023  TIME: 4:37 PM

## 2023-04-27 LAB
ANION GAP SERPL CALCULATED.3IONS-SCNC: 5 MMOL/L (ref 4–13)
BASOPHILS # BLD AUTO: 0.04 THOUSANDS/ΜL (ref 0–0.1)
BASOPHILS NFR BLD AUTO: 0 % (ref 0–1)
BUN SERPL-MCNC: 6 MG/DL (ref 5–25)
CALCIUM SERPL-MCNC: 8.7 MG/DL (ref 8.3–10.1)
CHLORIDE SERPL-SCNC: 110 MMOL/L (ref 96–108)
CO2 SERPL-SCNC: 22 MMOL/L (ref 21–32)
CREAT SERPL-MCNC: 0.58 MG/DL (ref 0.6–1.3)
EOSINOPHIL # BLD AUTO: 0.01 THOUSAND/ΜL (ref 0–0.61)
EOSINOPHIL NFR BLD AUTO: 0 % (ref 0–6)
ERYTHROCYTE [DISTWIDTH] IN BLOOD BY AUTOMATED COUNT: 13.4 % (ref 11.6–15.1)
GFR SERPL CREATININE-BSD FRML MDRD: 105 ML/MIN/1.73SQ M
GLUCOSE SERPL-MCNC: 92 MG/DL (ref 65–140)
HCT VFR BLD AUTO: 38.4 % (ref 34.8–46.1)
HGB BLD-MCNC: 11.9 G/DL (ref 11.5–15.4)
IMM GRANULOCYTES # BLD AUTO: 0.03 THOUSAND/UL (ref 0–0.2)
IMM GRANULOCYTES NFR BLD AUTO: 0 % (ref 0–2)
LYMPHOCYTES # BLD AUTO: 1.38 THOUSANDS/ΜL (ref 0.6–4.47)
LYMPHOCYTES NFR BLD AUTO: 14 % (ref 14–44)
MAGNESIUM SERPL-MCNC: 2 MG/DL (ref 1.6–2.6)
MCH RBC QN AUTO: 28.5 PG (ref 26.8–34.3)
MCHC RBC AUTO-ENTMCNC: 31 G/DL (ref 31.4–37.4)
MCV RBC AUTO: 92 FL (ref 82–98)
MONOCYTES # BLD AUTO: 0.75 THOUSAND/ΜL (ref 0.17–1.22)
MONOCYTES NFR BLD AUTO: 8 % (ref 4–12)
NEUTROPHILS # BLD AUTO: 7.75 THOUSANDS/ΜL (ref 1.85–7.62)
NEUTS SEG NFR BLD AUTO: 78 % (ref 43–75)
NRBC BLD AUTO-RTO: 0 /100 WBCS
PHOSPHATE SERPL-MCNC: 3.4 MG/DL (ref 2.7–4.5)
PLATELET # BLD AUTO: 230 THOUSANDS/UL (ref 149–390)
PMV BLD AUTO: 11.1 FL (ref 8.9–12.7)
POTASSIUM SERPL-SCNC: 3.9 MMOL/L (ref 3.5–5.3)
RBC # BLD AUTO: 4.18 MILLION/UL (ref 3.81–5.12)
SODIUM SERPL-SCNC: 137 MMOL/L (ref 135–147)
WBC # BLD AUTO: 9.96 THOUSAND/UL (ref 4.31–10.16)

## 2023-04-27 RX ORDER — METHOCARBAMOL 500 MG/1
500 TABLET, FILM COATED ORAL EVERY 6 HOURS SCHEDULED
Status: DISCONTINUED | OUTPATIENT
Start: 2023-04-27 | End: 2023-05-05 | Stop reason: HOSPADM

## 2023-04-27 RX ORDER — ACETAMINOPHEN 325 MG/1
975 TABLET ORAL EVERY 8 HOURS SCHEDULED
Status: DISCONTINUED | OUTPATIENT
Start: 2023-04-27 | End: 2023-05-05 | Stop reason: HOSPADM

## 2023-04-27 RX ORDER — DEXTROSE, SODIUM CHLORIDE, AND POTASSIUM CHLORIDE 5; .45; .15 G/100ML; G/100ML; G/100ML
84 INJECTION INTRAVENOUS CONTINUOUS
Status: DISCONTINUED | OUTPATIENT
Start: 2023-04-27 | End: 2023-04-28

## 2023-04-27 RX ORDER — GABAPENTIN 100 MG/1
100 CAPSULE ORAL 3 TIMES DAILY
Status: DISCONTINUED | OUTPATIENT
Start: 2023-04-27 | End: 2023-05-05 | Stop reason: HOSPADM

## 2023-04-27 RX ADMIN — DEXTROSE, SODIUM CHLORIDE, AND POTASSIUM CHLORIDE 100 ML/HR: 5; .45; .15 INJECTION INTRAVENOUS at 23:30

## 2023-04-27 RX ADMIN — HEPARIN SODIUM 7500 UNITS: 5000 INJECTION INTRAVENOUS; SUBCUTANEOUS at 23:06

## 2023-04-27 RX ADMIN — METHOCARBAMOL 500 MG: 500 TABLET ORAL at 08:33

## 2023-04-27 RX ADMIN — ACETAMINOPHEN 975 MG: 325 TABLET ORAL at 08:33

## 2023-04-27 RX ADMIN — GABAPENTIN 100 MG: 100 CAPSULE ORAL at 17:22

## 2023-04-27 RX ADMIN — POTASSIUM CHLORIDE 20 MEQ: 14.9 INJECTION, SOLUTION INTRAVENOUS at 08:36

## 2023-04-27 RX ADMIN — HEPARIN SODIUM 7500 UNITS: 5000 INJECTION INTRAVENOUS; SUBCUTANEOUS at 05:48

## 2023-04-27 RX ADMIN — AMLODIPINE BESYLATE 10 MG: 10 TABLET ORAL at 08:33

## 2023-04-27 RX ADMIN — DEXTROSE, SODIUM CHLORIDE, AND POTASSIUM CHLORIDE 100 ML/HR: 5; .45; .15 INJECTION INTRAVENOUS at 14:26

## 2023-04-27 RX ADMIN — ACETAMINOPHEN 975 MG: 325 TABLET ORAL at 23:05

## 2023-04-27 RX ADMIN — HEPARIN SODIUM 7500 UNITS: 5000 INJECTION INTRAVENOUS; SUBCUTANEOUS at 14:18

## 2023-04-27 RX ADMIN — GABAPENTIN 100 MG: 100 CAPSULE ORAL at 12:00

## 2023-04-27 RX ADMIN — ACETAMINOPHEN 975 MG: 325 TABLET ORAL at 14:18

## 2023-04-27 RX ADMIN — METHOCARBAMOL 500 MG: 500 TABLET ORAL at 14:18

## 2023-04-27 RX ADMIN — METHOCARBAMOL 500 MG: 500 TABLET ORAL at 23:05

## 2023-04-27 RX ADMIN — GABAPENTIN 100 MG: 100 CAPSULE ORAL at 23:06

## 2023-04-27 NOTE — UTILIZATION REVIEW
"Initial Clinical Review    Elective Inpatient surgical procedure  Age/Sex: 48 y o  female  Surgery Date: 4/26/23  Procedure: LAPAROSCOPY  EXTENSIVE LYSIS ADHESIONS  MOBILIZATION SPLENIC FLEXURE  RESECTION COLON SIGMOID LAPAROSCOPIC  RESECTION SMALL BOWEL  X 2  COLOSTOMY END  Anesthesia: general  Operative Findings: Sigmoid phlegmon with multiple loops of small bowel adhesions with fistulous connections  Signs of upstream dilation with large formed fecal burden    POD#1 Progress Note:  No acute events overnight  Abdomen soft, NT, ND, incisions CDI  Pain controlled  No N/v tolerated clears  Continue IVF, PCA, DC Johnson  Admission Orders: Date/Time/Statement:   Admission Orders (From admission, onward)     Ordered        04/26/23 1715  Inpatient Admission  Once                      Orders Placed This Encounter   Procedures    Inpatient Admission     Standing Status:   Standing     Number of Occurrences:   1     Order Specific Question:   Level of Care     Answer:   Med Surg [16]     Order Specific Question:   Estimated length of stay     Answer:   More than 2 Midnights     Order Specific Question:   Certification     Answer:   I certify that inpatient services are medically necessary for this patient for a duration of greater than two midnights  See H&P and MD Progress Notes for additional information about the patient's course of treatment       Vital Signs: /84   Pulse (!) 114   Temp 99 7 °F (37 6 °C)   Resp 18   Ht 4' 9\" (1 448 m)   Wt 84 8 kg (187 lb)   LMP  (LMP Unknown)   SpO2 (!) 82%   BMI 40 47 kg/m²     Pertinent Labs/Diagnostic Test Results:   No orders to display         Results from last 7 days   Lab Units 04/27/23  0456 04/26/23  1742   WBC Thousand/uL 9 96 9 77   HEMOGLOBIN g/dL 11 9 13 6   HEMATOCRIT % 38 4 42 6   PLATELETS Thousands/uL 230 264   NEUTROS ABS Thousands/µL 7 75*  --          Results from last 7 days   Lab Units 04/27/23  0456 04/26/23  1742   SODIUM mmol/L 137 140 " POTASSIUM mmol/L 3 9 3 6   CHLORIDE mmol/L 110* 113*   CO2 mmol/L 22 25   ANION GAP mmol/L 5 2*   BUN mg/dL 6 7   CREATININE mg/dL 0 58* 0 76   EGFR ml/min/1 73sq m 105 89   CALCIUM mg/dL 8 7 7 8*   MAGNESIUM mg/dL 2 0  --    PHOSPHORUS mg/dL 3 4  --          Results from last 7 days   Lab Units 04/26/23  1720 04/26/23  0932   POC GLUCOSE mg/dl 125 127     Results from last 7 days   Lab Units 04/27/23  0456 04/26/23  1742   GLUCOSE RANDOM mg/dL 92 143*       Diet: clears  Mobility: OOB and ambulatory  DVT Prophylaxis: heparin, scd, ambulation    Medications/Pain Control:   Scheduled Medications:  acetaminophen, 975 mg, Oral, Q8H DEMIAN  amLODIPine, 10 mg, Oral, Daily  gabapentin, 100 mg, Oral, TID  heparin (porcine), 7,500 Units, Subcutaneous, Q8H DEMIAN  methocarbamol, 500 mg, Oral, Q6H [address]  potassium chloride, 20 mEq, Intravenous, TID      Continuous IV Infusions:  dextrose 5 % and sodium chloride 0 45 % with KCl 20 mEq/L, 100 mL/hr, Intravenous, Continuous  HYDROmorphone, , Intravenous, Continuous      PRN Meds:  HYDROmorphone, 0 5 mg, Intravenous, Q3H PRN  lactated ringers, 1,000 mL, Intravenous, Once PRN   And  lactated ringers, 1,000 mL, Intravenous, Once PRN  naloxone, 0 04 mg, Intravenous, Q1MIN PRN  ondansetron, 4 mg, Intravenous, Q6H PRN  sodium chloride, 1,000 mL, Intravenous, Once PRN   And  sodium chloride, 1,000 mL, Intravenous, Once PRN        Network Utilization Review Department  ATTENTION: Please call with any questions or concerns to [phone] and carefully listen to the prompts so that you are directed to the right person  All voicemails are confidential   [facility] all requests for admission clinical reviews, approved or denied determinations and any other requests to dedicated fax number below belonging to the campus where the patient is receiving treatment   List of dedicated fax numbers for the Facilities:  FACILITY NAME UR FAX NUMBER   ADMISSION DENIALS (Administrative/Medical Necessity) [address] [facility] (Maternity/NICU/Pediatrics) [address] [place] [address],[address]  [phone]   [address] [facility] [address] [facility] [address] [phone]   [address] [phone]

## 2023-04-27 NOTE — PHYSICAL THERAPY NOTE
Physical Therapy Evaluation     Patient's Name: Zoila Cruz    Admitting Diagnosis  Sigmoid diverticulitis [K57 32]    Problem List  Patient Active Problem List   Diagnosis    Sigmoid diverticulitis    Essential hypertension    Pain of upper abdomen    Large bowel obstruction [facility])       Past Medical History  Past Medical History:   Diagnosis Date    Diverticulitis     Diverticulosis     Fibroids     Hypertension        Past Surgical History  Past Surgical History:   Procedure Laterality Date    COLONOSCOPY      TUBAL LIGATION  1991 04/27/23 0848   PT Last Visit   PT Visit Date 04/27/23   Note Type   Note type Evaluation   Pain Assessment   Pain Assessment Tool 0-10   Pain Score 7   Pain Location/Orientation Location: Abdomen   Patient's Stated Pain Goal No pain   Hospital Pain Intervention(s) Repositioned; Ambulation/increased activity   Restrictions/Precautions   Weight Bearing Precautions Per Order No   Other Precautions Multiple lines;O2;Fall Risk;Pain   Home Living   Type of [address] [place] [address] One level  (1 MARY)   Bathroom Shower/Tub Tub/shower unit   Bathroom Toilet Standard   Bathroom Equipment   (No DME)   Home Equipment   (No DME)   Additional Comments Pt lives with fiance in [facility], no DME used pta   Prior Function   Level of [place] Independent with ADLs; Independent with functional mobility; Independent with IADLS   Lives With Significant other   Receives Help From Family   IADLs Independent with driving; Independent with meal prep; Independent with medication management   Falls in the last 6 months 0   Vocational Full time employment   Cognition   Overall Cognitive Status WFL   Arousal/Participation Alert   Attention Within functional limits   Orientation Level Oriented X4   Following Commands Follows all commands and directions without difficulty   Comments Pt cooperative during session  Pt tearful and emotional, comfort and reassurance provided     RLE Assessment   RLE Assessment WFL   LLE Assessment   LLE Assessment WFL   Bed Mobility   Supine to Sit 2  Maximal assistance   Additional items Assist x 1;HOB elevated; Bedrails; Increased time required;Verbal cues;LE management   Sit to Supine Unable to assess   Additional Comments Pt noted to be in bed upon arrival  Noted to have leaking from ostomy, nurse notified  RN in room during initial part of session for ostomy care /education  Pt then able to get to EOB with Max assist X 1, eduacted regarding log roll technique for OOB  Transfers   Sit to Stand 4  Minimal assistance   Additional items Assist x 1; Increased time required;Verbal cues   Stand to Sit 4  Minimal assistance   Additional items Assist x 1; Increased time required;Verbal cues   Additional Comments with RW for improved steadiness and safety   Ambulation/Elevation   Gait pattern Wide CLEMENCIA   Gait Assistance 4  Minimal assist   Additional items Assist x 1;Verbal cues   Assistive Device Rolling walker   Distance 2' from bed to chair   Ambulation/Elevation Additional Comments Pt limited in mobility this date 2* pain and ostomy leakage  Balance   Static Sitting Fair +   Dynamic Sitting Fair   Static Standing Fair -   Dynamic Standing Poor +   Ambulatory Poor +   Activity Tolerance   Activity Tolerance Patient limited by pain   Medical Staff Made Aware Co-eval with OT 2* meidcal complexity and multiple co morbidities   Nurse Made Aware RN cleared pt for therapy/present during session/updated   Assessment   Prognosis Fair   Problem List Decreased endurance;Decreased mobility;Pain   Assessment Pt is a 48 y o  female seen for PT evaluation s/p admit to [facility] on 4/26/2023  Pt was admitted with a primary dx of: sigmoid stricture s/p lap hand assisted sigmoidectomy with end colostomy,SBR  4/26  PT now consulted for assessment of mobility and d/c needs  Pt with Up as tolerated orders    Pts current comorbidities affecting treatment include: Sigmoid diverticulitis, Essential HTN, Large bowel obstruction  Pts current clinical presentation is Unstable/ Unpredictable (high complexity) due to Ongoing medical management for primary dx, Increased reliance on more restrictive AD compared to baseline, Increased assistance needed from caregiver at current time, GERMÁN drain in place at current time, s/p surgical intervention  Prior to admission, pt was Ind for mobility and ADLs, denies use of AD, lives with SO in [facility]  Upon evaluation, pt currently is requiring Max A X 1 for bed mobility; Min A x1 for transfers; Min A x1 for ambulation 2 ft w/ RW  Limited in mobility 2* abd pain +ostomy leakage  Pt presents at PT eval functioning below baseline and currently w/ overall mobility deficits 2* to: pain, decreased functional mobility tolerance compared to baseline  Pt will continue to benefit from skilled acute PT interventions to address stated impairments; to maximize functional mobility; for ongoing pt  training; and DME needs  At conclusion of PT session all needs in reach, RN notified of session findings/recommendations and pt returned back in recliner chair with phone and call bell within reach  The patient's AM-PAC Basic Mobility Inpatient Short Form Raw Score is  17  A Raw score of greater than 16 suggests the patient may benefit from discharge to home  Please also refer to the recommendation of the Physical Therapist for safe discharge planning  Recommend HHPT with increased caregiver support upon hospital D/C, as anticipate pt to improve with mobility level through hospital course   Goals   Patient Goals to have less pain   STG Expiration Date 05/11/23   Short Term Goal #1 STG 1  Pt will be able to perform bed mobility tasks with Mod Ind in order to improve overall functional mobility and assist in safe d/c  STG 2  Pt with sit EOB for at least 25 minutes at Ind level in order to strengthen abdominal musculature and assist in future transfers/ ambulation  STG 3   Pt will be able to perform functional transfer with Supervision in order to improve overall functional mobility and assist in safe d/c  STG 4  Pt will be able to ambulate at least 300 feet with least restrictive device with Supervision A in order to improve overall functional mobility and assist in safe d/c  STG 5  Pt will improve sitting/standing static/dynamic balance 1/2 grade in order to improve functional mobility and assist in safe d/c  STG 6  Pt will improve LE strength by 1/2 grade in order to improve functional mobility and assist in safe d/c    PT Treatment Day 0   Plan   Treatment/Interventions Functional transfer training; Therapeutic exercise; Bed mobility;Gait training;Spoke to nursing;OT   PT Frequency 2-3x/wk   Recommendation   PT Discharge Recommendation Home with home health rehabilitation  (with increased caregiver support)   Equipment Recommended Walker   AM-PAC Basic Mobility Inpatient   Turning in Flat Bed Without Bedrails 3   Lying on Back to Sitting on Edge of Flat Bed Without Bedrails 2   Moving Bed to Chair 3   Standing Up From Chair Using Arms 3   Walk in Room 3   Climb 3-5 Stairs With Railing 3   Basic Mobility Inpatient Raw Score 17   Basic Mobility Standardized Score 39 67   Highest Level Of Mobility   -HLM Goal 5: Stand one or more mins   -HLM Achieved 5: Stand (1 or more minutes)   Modified Satish Scale   Modified Satish Scale 4   End of Consult   Patient Position at End of Consult All needs within reach; Bedside chair         Bud Hughes, PT DPT

## 2023-04-27 NOTE — WOUND OSTOMY CARE
Consult Note- Ostomy  Marichuy Brush 48 y o  female  [id number]  [facility] [address] [id number]    Assessment:  Patient is a 47 yo female that was admitted to [facility] for surgical treatment of sigmoid stricture likely secondary to diverticulitis  Patient is POD 1 end colostomy formation 4/26  Wound care consulted for ostomy teaching  Patient is awake and alert on PCA pump  Patient is agreeable to visit  Patient is primary learner  Significant other present at bedside for pouch change  Patient was tearful during entire ostomy changing/ teaching session  Patient asked multiple times if stoma was her intestine  Patient listened to 100% os ostomy teaching  Patient stated that she could not look down and watch bag change due to her stomach being distended  Topics reviewed with patient and significant other: normal & abdormal stoma appearance, how and when to empty the pouch (1/3- 1/2 full) to prevent pulling/lifting of the barrier, importance & how to clean the end of the pouch to prevent odor after pouch emptying, gas/odor producing foods, frequency of changing of the pouch (every 3-4 days on a schedule), burping, one piece/two piece pouching systems differences, open and closed end pouches, gas valve functionality of one piece, Clothing- including avoiding placing belt-line/seat belts over the stoma, bathing, bulmaro-stomal skin care, how to measure the stoma/ cut the wafer to the correct size, how to close the pouch, and how to obtain supplies  Step-by-step pouch change done using Convatec One Piece Pouch  Reviewed with patient how and when to measure the stoma (weekly)  Demonstrated how to mold two piece barrier/ trace & cut one piece barrier, and how to apply it to the skin using gentle pressure / warmth of the hands  Skin prep applied to bulmaro-stomal skin, rationale explained to patient  Good seal obtained  Patient verbalized understanding of education and teaching  Patient is active learner   All "questions and concerns answered  Encouraged patient to continue to read the educational materials provided - \"Life after colostomy Surgery\" by [facility]  Patient gave verbal permission to order sample kits from Sarah Krishnamurthy and Praful Bhatia     Primary RN messaged WO RN stating that pouch began to leak  WO RN offered use of esteem 4 inch pouch instead of convatec one piece flat pouch that cuts up to 2 5 inches  Wound care will round on patient tomorrow to assess how new pouch is adhering to the patient's skin     Stoma appearance:       Stoma: Red, oval, well budded stoma with center os, peristomal skin is intact  Stoma attached to skin junction, no separation noted  Deep Creases noted at 3 o'clock and 9 o'clock  Scant sanguinous drainage noted in previous pouch  STOMA MEASUREMENT: 2 inches top to bottom and 2 5 inches side to side     Ostomy Care:  -Stoma Measurement: 2 inches top to bottom and 2 5 inches side to side (Measure stoma weekly for next 6-8 weeks- stoma will decrease in size due to decrease in swelling)     -Appliance Used During Bag Change: Esteem One Piece Cut to Fit (Storeroom number: [id number])   -Accessories Used: 3M No Sting Skin Prep, 2 inch Dennise Ring     *Can shower with pouch on or off  Make sure to dry off pouch after shower  Bag Change Steps:  1  Peel back pouch using push-pull method, may use non-alcohol adhesive remover  Remove pouch from top to bottom  2  Use warm water only to cleanse skin around the stoma (bulmaro-stomal skin)  3  Make sure all adhesive residue is removed and skin is dry and not oily  4  Measure stoma size using measuring guide and trace correct measurements onto back of pouch  5  Then cut or mold the backing of pouch out to correct shape/size  6  Use 3M no sting barrier film to prep the skin around the stoma  (If the skin is open, moist or fragile, Crusting can be done now to create dry skin and assist with pouch adherence- steps are listed below)  7   " "Apply Dennise Ring to Creases at 3 o'clock and 9 o'clock to make a flat surface for the pouch to lay on   8  Place pouch over stoma and onto skin  9  Use warmth of hand to apply gentle pressure to help backing of pouch to adhere well to skin  TIPS:  Empty pouch when 1/3 -1/2 full  Change pouch every 4-5 days or if signs of leaking  Crusting as needed for moist, red, open skin around the stoma: (Done prior to pouch application)   Apply stoma powder to excoriation, move excess powder away with hand  Use no sting barrier to pat area to form \"crust\"  Repeat X2 before placing new ostomy pouch     Orders listed below and wound care will continue to follow, call or tiger text with questions  Bedside nurse updated of findings and orders  Flowsheets below with pictures and measurements        Consuelo Maynard RN, BSN   "

## 2023-04-27 NOTE — PLAN OF CARE
"  Problem: OCCUPATIONAL THERAPY ADULT  Goal: Performs self-care activities at highest level of function for planned discharge setting  See evaluation for individualized goals  Description: Treatment Interventions: ADL retraining, Functional transfer training, Endurance training, Patient/family training, Equipment evaluation/education, Compensatory technique education, Continued evaluation, Energy conservation, Activityengagement  Equipment Recommended: Bedside commode, Shower/Tub chair with back ($)       See flowsheet documentation for full assessment, interventions and recommendations  Note: Limitation: Decreased ADL status, Decreased endurance, Decreased self-care trans, Decreased high-level ADLs  Prognosis: Fair  Assessment: Pt is a 47 yo female who presented to [facility] for surgical treatment of sigmoid stricture 2* diverticulitis in which pt s/p \"LAPAROSCOPY  EXTENSIVE LYSIS ADHESIONS  MOBILIZATION SPLENIC FLEXURE  RESECTION COLON SIGMOID LAPAROSCOPIC RESECTION SMALL BOWEL  X 2 COLOSTOMY END\" on 4/26  Pt  has a past medical history of Diverticulitis, Diverticulosis, Fibroids, and Hypertension  Pt with active OT orders in which OT consulted to assess pt's functional status and occupational performance to determine safe d/c needs  Pt seen with PT to increase safety, decrease fall risk, and maximize functional/occupational performance 2* medical complexity which is a regression from pt's functional baseline  Pt lives with her frankie in a 1 SH with 1 MARY  PTA, pt was independent in ADLs/IADLs and functional mobility w/ no DME  (+) driving  Currently, pt performing bed mobility w/ Max A, functional transfers/mobility w/ Min A x1 w/ RW, UB ADLs w/ Min A, and LB ADLs w/ Mod A 2* pain and decreased functional reach   Pt demonstrates the following limitations/impairments which impact the pt's ability to engage in valued occupations: endurance/activity tolerance, standing tolerance, functional reach, postural/trunk " control, strength, and pain  From an OT standpoint, recommend discharge to home w/ home health once medically stable  The patient's raw score on the AM-PAC Daily Activity Inpatient Short Form is 19  A raw score of greater than or equal to 19 suggests the patient may benefit from discharge to home  Please refer to the recommendation of the Occupational Therapist for safe discharge planning  Pt would benefit from skilled OT services 2-3x/wk to address immediate acute care needs and underlying performance skills to promote safety, decrease fall risk, and enhance occupational performance to return to PLOF  Goals to be met within the next 10-14 days       OT Discharge Recommendation: Home with home health rehabilitation (Pending pain management, anticipate pt will d/c to home w/ home health and increased support)

## 2023-04-27 NOTE — DISCHARGE INSTR - OTHER ORDERS
"Ostomy Care:  -Stoma Measurement: 2 in top to bottom and 2 5 in side to side (Measure stoma weekly for next 6-8 weeks- stoma will decrease in size due to decrease in swelling)     -Appliance Used During Bag Change: Esteem One Piece Cut to Fit (Storeroom number: [id number])   -Accessories Used:3M No Sting Skin Protectant, 4 inch Dennise Ring    *Can shower with pouch on or off  Make sure to dry off pouch after shower  Bag Change Steps:  1  Peel back pouch using push-pull method, may use non-alcohol adhesive remover  Remove pouch from top to bottom  2  Use warm water only to cleanse skin around the stoma (bulmaro-stomal skin)  3  Make sure all adhesive residue is removed and skin is dry and not oily  4  Measure stoma size using measuring guide and trace correct measurements onto back of pouch  5  Then cut or mold the backing of pouch out to correct shape/size  6  Use 3M no sting barrier film to prep the skin around the stoma  (If the skin is open, moist or fragile, Crusting can be done now to create dry skin and assist with pouch adherence- steps are listed below)  7  Apply Dennise Ring in [address] on lower half of stoma  8  Place pouch over stoma and onto skin  9  Use warmth of hand to apply gentle pressure to help backing of pouch to adhere well to skin  TIPS:  Empty pouch when 1/3 -1/2 full  Change pouch every 4-5 days or if signs of leaking  Crusting as needed for moist, red, open skin around the stoma: (Done prior to pouch application)   Apply stoma powder to excoriation, move excess powder away with hand  Use no sting barrier to pat area to form \"crust\"  Repeat X2 before placing new ostomy pouch     Please call [facility] @ [phone] with any concerns, questions, or help needed  Ostomy Education Resources:   [email or web address]   Www [employer]   Www coloplast us   Www [employer]     Skin Care Plan:  1-Cleanse Midline Incision Wound wit NS and pat dry   Apply Maxorb AG " (Silver Alginate) to wound bed  Cover with 1/2 of ABD pad  Change daily or as needed with soilage

## 2023-04-27 NOTE — OCCUPATIONAL THERAPY NOTE
Occupational Therapy Evaluation     Patient Name: Brittany Pagan  NEAEV'H Date: 4/27/2023  Problem List  Active Problems:    * No active hospital problems  *    Past Medical History  Past Medical History:   Diagnosis Date    Diverticulitis     Diverticulosis     Fibroids     Hypertension      Past Surgical History  Past Surgical History:   Procedure Laterality Date    COLONOSCOPY      TUBAL LIGATION  1991 04/27/23 0813   OT Last Visit   OT Visit Date 04/27/23   Note Type   Note type Evaluation   Additional Comments Pt seen w/ PT to increase safety and decrease fall risk, and maximize functional/occupational performance 2*medical complexity which is a regression from pt's functional baseline  Pain Assessment   Pain Assessment Tool 0-10   Pain Score 7   Pain Location/Orientation Location: Abdomen; Location: Incision   Effect of Pain on Daily Activities Impacts ability to engage in valued occupations   Hospital Pain Intervention(s) Repositioned; Ambulation/increased activity; Emotional support   Restrictions/Precautions   Weight Bearing Precautions Per Order No   Other Precautions Multiple lines;O2;Fall Risk;Pain  (colostomy, GERMÁN drain, rod, PCA pump)   Home Living   Type of Home House  (1  with 1 MARY)   Home Layout One level;Performs ADLs on one level; Able to live on main level with bedroom/bathroom; Access   Bathroom Shower/Tub Tub/shower unit   Bathroom Toilet Standard   Bathroom Equipment Other (Comment)  (No DME)   Bathroom Accessibility Accessible   Home Equipment Other (Comment)  (No DME)   Additional Comments Pt reports living with her frankie in a 1  with 1 MARY; no DME PTA; frankie present in room at time of eval reporting that he can take time of work to assist with functional needs prn   Prior Function   Level of [place] Independent with ADLs; Independent with functional mobility; Independent with IADLS   Lives With Significant other   Receives Help From Family   IADLs Independent "with driving; Independent with meal prep; Independent with medication management   Falls in the last 6 months 0   Vocational Full time employment   Comments (+) driving   Lifestyle   Autonomy PTA, pt was independent in ADLs/IADLs and functional mobility w/ no DME; (+) driving   Reciprocal Relationships Lives with her frankie   Service to Others Reports working full time   Vani Azul Enjoys going on camping trips   General   Family/Caregiver Present Yes   Additional General Comments Pt's velvete present during eval   Subjective   Subjective \"It hurts to move  \"   ADL   Where Assessed Edge of bed   Eating Assistance 5  Supervision/Setup   Grooming Assistance 5  Supervision/Setup   UB Bathing Assistance 4  Minimal Assistance   LB Bathing Assistance 3  Moderate Assistance   [address] 4  Minimal [address] 3  Moderate [address]  4  Minimal Assistance   Functional Assistance 4  Minimal Assistance   Bed Mobility   Supine to Sit 2  Maximal assistance   Additional items Assist x 1;HOB elevated; Bedrails; Increased time required;Verbal cues;LE management   Sit to Supine Unable to assess   Additional Comments Prior to bed mobility tasks, RN present in room in which increased time spent on ostomy care/education provided; pt performed supine <> sit with Max A x1 for UB postural support/LE management and was educated on logroll technique in which pt demonstrated fair carryover; @ end of session, pt left sitting upright in recliner with all functional needs in reach   Transfers   Sit to Stand 4  Minimal assistance   Additional items Assist x 1; Increased time required;Verbal cues   Stand to Sit 4  Minimal assistance   Additional items Assist x 1; Increased time required;Verbal cues   Additional Comments w/ RW for safety and support   Functional Mobility   Functional Mobility 4  Minimal assistance   Additional Comments Pt completed short household functional " "mobility distances w/ Min A x1 w/ RW for safety and support with increased time 2* pain   Additional items Rolling walker   Balance   Static Sitting Fair +   Dynamic Sitting Fair   Static Standing Fair -   Dynamic Standing Poor +   Ambulatory Poor +   Activity Tolerance   Activity Tolerance Patient limited by pain   Medical Staff Petros Bowens DPT   Nurse Made Aware RN cleared and present during the beginning of the eval   RUE Assessment   RUE Assessment WFL   LUE Assessment   LUE Assessment WFL   Hand Function   Gross Motor Coordination Functional   Fine Motor Coordination Functional   Psychosocial   Psychosocial (WDL) WDL   Cognition   Overall Cognitive Status WFL   Arousal/Participation Alert; Responsive;Arousable; Cooperative   Attention Within functional limits   Orientation Level Oriented X4   Memory Within functional limits   Following Commands Follows all commands and directions without difficulty   Comments Pt pleasant and cooperative; increased emotional support/encouragement provided t/o session as pt was tearful/emotional regarding new ostomy bag and adjusting to daily occupations   Assessment   Limitation Decreased ADL status; Decreased endurance;Decreased self-care trans;Decreased high-level ADLs   Prognosis Fair   Assessment Pt is a 49 yo female who presented to [facility] for surgical treatment of sigmoid stricture 2* diverticulitis in which pt s/p \"LAPAROSCOPY  EXTENSIVE LYSIS ADHESIONS  MOBILIZATION SPLENIC FLEXURE  RESECTION COLON SIGMOID LAPAROSCOPIC RESECTION SMALL BOWEL  X 2 COLOSTOMY END\" on 4/26  Pt  has a past medical history of Diverticulitis, Diverticulosis, Fibroids, and Hypertension  Pt with active OT orders in which OT consulted to assess pt's functional status and occupational performance to determine safe d/c needs   Pt seen with PT to increase safety, decrease fall risk, and maximize functional/occupational performance 2* medical complexity which is a regression from pt's functional " baseline  Pt lives with her frankie in a 1 SH with 1 MARY  PTA, pt was independent in ADLs/IADLs and functional mobility w/ no DME  (+) driving  Currently, pt performing bed mobility w/ Max A, functional transfers/mobility w/ Min A x1 w/ RW, UB ADLs w/ Min A, and LB ADLs w/ Mod A 2* pain and decreased functional reach  Pt demonstrates the following limitations/impairments which impact the pt's ability to engage in valued occupations: endurance/activity tolerance, standing tolerance, functional reach, postural/trunk control, strength, and pain  From an OT standpoint, recommend discharge to home w/ home health once medically stable  The patient's raw score on the AM-PAC Daily Activity Inpatient Short Form is 19  A raw score of greater than or equal to 19 suggests the patient may benefit from discharge to home  Please refer to the recommendation of the Occupational Therapist for safe discharge planning  Pt would benefit from skilled OT services 2-3x/wk to address immediate acute care needs and underlying performance skills to promote safety, decrease fall risk, and enhance occupational performance to return to PLOF  Goals to be met within the next 10-14 days  Goals   Patient Goals To have less pain   LTG Time Frame 10-14   Long Term Goal #1 See OT goals listed below   Plan   Treatment Interventions ADL retraining;Functional transfer training; Endurance training;Patient/family training;Equipment evaluation/education; Compensatory technique education;Continued evaluation; Energy conservation; Activityengagement   Goal Expiration Date 05/11/23   OT Frequency 2-3x/wk   Recommendation   OT Discharge Recommendation Home with home health rehabilitation  (Pending pain management, anticipate pt will d/c to home w/ home health and increased support)   Equipment Recommended Bedside commode; Shower/Tub chair with back ($)   Commode Type Standard   AM-PAC Daily Activity Inpatient   Lower Body Dressing 2   Bathing 3   Toileting 2 Upper Body Dressing 4   Grooming 4   Eating 4   Daily Activity Raw Score 19   Daily Activity Standardized Score (Calc for Raw Score >=11) 40 22   AM-PAC Applied Cognition Inpatient   Following a Speech/Presentation 4   Understanding Ordinary Conversation 4   Taking Medications 4   Remembering Where Things Are Placed or Put Away 4   Remembering List of 4-5 Errands 4   Taking Care of Complicated Tasks 4   Applied Cognition Raw Score 24   Applied Cognition Standardized Score 62 21   End of Consult   Education Provided Yes;Family or social support of family present for education by provider   Patient Position at End of Consult Bedside chair; All needs within reach   Nurse Communication Nurse aware of consult     OT GOALS:    Pt will improve functional mobility during ADL/IADL/leisure tasks with Mod I using AE/DME prn  Pt will improve activity tolerance/functional endurance during ADL/IADL/leisure tasks for at least 20 minutes to improve occupational performance and engagement in valued occupations using AE/DME prn  Pt will engage in ongoing functional/formal cognitive assessments to assist with safe d/c planning and increase safety during functional tasks  Pt will participate in simulated IADL management task w/ Mod I to increase independence and engagement in valued occupations w/ good balance/safety  Pt will improve dynamic standing balance for at least 15 minutes with Mod I during functional tasks to decrease fall risk and improve independence and engagement in ADL/IADL/leisure activities  Pt will follow 100% of multi-step commands in ADL/IADL/leisure activities to improve functional cognition used in functional daily routines  Pt will complete functional transfers on and off all surfaces used in daily routines with Mod I for safety to maximize functional/occupational performance      Pt will complete all bed mobility tasks with Mod I to serve as a prerequisite for EOB/OOB ADL/IADL/leisure tasks, optimize positioning/comfort, and increase functional independence  Pt will independently demonstrate good carryover of safety precautions and education/training during ADL/IADL/leisure tasks with energy conservation techniques s/p skilled instruction without verbal cues  Pt will complete UB ADL tasks with Mod I using AE/DME prn to increase functional independence in ADL/IADL/leisure tasks  Pt will complete LB ADL tasks with Mod I using AE/DME prn to increase functional independence in ADL/IADL/leisure tasks  Pt will complete toileting tasks with Mod I and good hygiene/thoroughness using AE/DME prn to increase functional independence  Pt will independently identify and utilize 2-3 positive coping strategies to enhance overall wellbeing and engagement in valued occupations      Tc Colon MS, OTR/L

## 2023-04-27 NOTE — PLAN OF CARE
Problem: PHYSICAL THERAPY ADULT  Goal: Performs mobility at highest level of function for planned discharge setting  See evaluation for individualized goals  Description: Treatment/Interventions: Functional transfer training, Therapeutic exercise, Bed mobility, Gait training, Spoke to nursing, OT  Equipment Recommended: Brenda Monsivais       See flowsheet documentation for full assessment, interventions and recommendations  Note: Prognosis: Fair  Problem List: Decreased endurance, Decreased mobility, Pain  Assessment: Pt is a 48 y o  female seen for PT evaluation s/p admit to [facility] on 4/26/2023  Pt was admitted with a primary dx of: sigmoid stricture s/p lap hand assisted sigmoidectomy with end colostomy,SBR  4/26  PT now consulted for assessment of mobility and d/c needs  Pt with Up as tolerated orders  Pts current comorbidities affecting treatment include: Sigmoid diverticulitis, Essential HTN, Large bowel obstruction  Pts current clinical presentation is Unstable/ Unpredictable (high complexity) due to Ongoing medical management for primary dx, Increased reliance on more restrictive AD compared to baseline, Increased assistance needed from caregiver at current time, GERMÁN drain in place at current time, s/p surgical intervention  Prior to admission, pt was Ind for mobility and ADLs, denies use of AD, lives with SO in 1STH 1STE  Upon evaluation, pt currently is requiring Max A X 1 for bed mobility; Min A x1 for transfers; Min A x1 for ambulation 2 ft w/ RW  Limited in mobility 2* abd pain +ostomy leakage  Pt presents at PT eval functioning below baseline and currently w/ overall mobility deficits 2* to: pain, decreased functional mobility tolerance compared to baseline  Pt will continue to benefit from skilled acute PT interventions to address stated impairments; to maximize functional mobility; for ongoing pt  training; and DME needs   At conclusion of PT session all needs in reach, RN notified of session findings/recommendations and pt returned back in recliner chair with phone and call bell within reach  The patient's AM-PAC Basic Mobility Inpatient Short Form Raw Score is  17  A Raw score of greater than 16 suggests the patient may benefit from discharge to home  Please also refer to the recommendation of the Physical Therapist for safe discharge planning  Recommend HHPT with increased caregiver support upon hospital D/C, as anticipate pt to improve with mobility level through hospital course   PT Discharge Recommendation: Home with home health rehabilitation (with increased caregiver support)    See flowsheet documentation for full assessment

## 2023-04-27 NOTE — MALNUTRITION/BMI
This medical record reflects one or more clinical indicators suggestive of morbid obesity  BMI Findings:  Adult BMI Classifications: Morbid Obesity 40-44 9        Body mass index is 40 47 kg/m²  See Nutrition note dated 4/27/23 for additional details  Completed nutrition assessment is viewable in the nutrition documentation

## 2023-04-27 NOTE — PROGRESS NOTES
"Progress Note - Colorectal Surgery   Yazmin Mariscal 48 y o  female MRN: [id number]  Unit/Bed#: [facility] 820-01 Encounter: [id number]    Assessment:  Patient is a 48 y o  female w/ sigmoid stricture s/p lap hand assisted sigmoidectomy with end colostomy, SBRx2, HELENA 4/26    Ostomy: 750cc  GERMÁN 200ccSS    Plan:   Clears   IVF   PCA   D/c rod   OOB/ambulate   Please [employer] on call [facility] Colorectal Role if any further questions    Subjective/Objective     Subjective:   No acute events overnight  Pain controlled  No N/v tolerated clears    Pertinent review of systems as above  All other review of systems negative  Objective:    Blood pressure 126/73, pulse 105, temperature 99 3 °F (37 4 °C), resp  rate 20, height 4' 9\" (1 448 m), weight 84 8 kg (187 lb), SpO2 93 %  ,Body mass index is 40 47 kg/m²  Intake/Output Summary (Last 24 hours) at 4/27/2023 0618  Last data filed at 4/27/2023 0501  Gross per 24 hour   Intake 5850 ml   Output 1555 ml   Net 4295 ml       Invasive Devices     Peripheral Intravenous Line  Duration           Peripheral IV 04/26/23 Left Hand <1 day    Peripheral IV 04/26/23 Right Wrist <1 day          Drain  Duration           Closed/Suction Drain Right RLQ Bulb <1 day    Colostomy Transverse LUQ <1 day    Urethral Catheter Non-latex 16 Fr  <1 day                Physical Exam:   Gen:  NAD  HEENT: NCAT  MMM  CV: well perfused  Lungs: Normal respiratory effort  Abd: soft, nt/nd, incisions cdi  Skin: warm/ dry  Extremities: no peripheral edema, no clubbing or cyanosis  Neuro:  AxO x3      Results from last 7 days   Lab Units 04/27/23  0456 04/26/23  1742   WBC Thousand/uL 9 96 9 77   HEMOGLOBIN g/dL 11 9 13 6   HEMATOCRIT % 38 4 42 6   PLATELETS Thousands/uL 230 264     Results from last 7 days   Lab Units 04/26/23  1742   POTASSIUM mmol/L 3 6   CHLORIDE mmol/L 113*   CO2 mmol/L 25   BUN mg/dL 7   CREATININE mg/dL 0 76   CALCIUM mg/dL 7 8*            I have personally reviewed " pertinent films in PACS      Medications:   Scheduled Meds:  Current Facility-Administered Medications   Medication Dose Route Frequency Provider Last Rate    amLODIPine  10 mg Oral Daily Nereyda Piedra MD      heparin (porcine)  7,500 Units Subcutaneous [facility] Nereyda Piedra MD      HYDROmorphone   Intravenous Continuous Nereyda Piedra MD      HYDROmorphone  0 5 mg Intravenous Q3H PRN Alexei Pink MD      lactated ringers  1,000 mL Intravenous Once PRN Nereyda Piedra MD      And    lactated ringers  1,000 mL Intravenous Once PRN Nereyda Piedra MD      lactated ringers  125 mL/hr Intravenous Continuous Nereyda Piedra  mL/hr (04/26/23 2306)    naloxone  0 04 mg Intravenous Q1MIN PRN Nereyda Piedra MD      ondansetron  4 mg Intravenous Q6H PRN Nereyda Piedra MD      potassium chloride  20 mEq Intravenous TID Nereyda Piedra MD 20 mEq (04/26/23 2147)    sodium chloride  1,000 mL Intravenous Once PRN Nereyda Piedra MD      And    sodium chloride  1,000 mL Intravenous Once PRN Nereyda Piedra MD       Continuous Infusions:HYDROmorphone,   lactated ringers, 125 mL/hr, Last Rate: 125 mL/hr (04/26/23 2306)      PRN Meds:  HYDROmorphone, 0 5 mg, Q3H PRN  lactated ringers, 1,000 mL, Once PRN   And  lactated ringers, 1,000 mL, Once PRN  naloxone, 0 04 mg, Q1MIN PRN  ondansetron, 4 mg, Q6H PRN  sodium chloride, 1,000 mL, Once PRN   And  sodium chloride, 1,000 mL, Once PRN      VTE Pharmacologic Prophylaxis: Heparin  VTE Mechanical Prophylaxis: sequential compression device

## 2023-04-27 NOTE — CONSULTS
Consult Note- Acute Pain Service   Magdalena Kinsey 48 y o  female MRN: [id number]  Unit/Bed#: [facility] 820-01 Encounter: [id number]               Assessment/Plan     Assessment:   Patient Active Problem List   Diagnosis    Sigmoid diverticulitis    Essential hypertension    Pain of upper abdomen    Large bowel obstruction (HCC)      Magdalena Kinsey is a 48 y o  female with PMHx of HTN and diverticulitis who presented with sigmoid stricture likely secondary to diverticulitis now s/p land hand assisted sigmoidectomy with end colostomy, SBR, and HELENA on (04/26/23) and started on a dilaudid PCA postoperatively  APS was consulted for postoperative pain management  Today, patient was resting comfortably in the chair at bedside when I examined her  She denies any pain at this time and review of pain scores verify good pain control  She denies any side effects from the dPCA including oversedation, mental fogging, nausea, vomiting  She has been advanced to clear liquid diet and reports tolerating well to this point  Johnson in place with plans for removal later today  Pending her clinical progress and ROBF will likely transition to PO regimen later today or tomorrow  Plan:    - continue dilaudid PCA at 0/0 2/5/2mg   - once tolerating PO, would recommend the following for transition:        - oxycodone 5mg/10mg PO q4h PRN moderate/severe pain        - dilaudid 0 2mg IV q2h PRN breakthrough pain    Multimodal Analgesia:   - continue acetaminophen 975mg PO q8h scheduled   - continue robaxin 500mg PO q6h scheduled   - continue gabapentin 100mg TID    Bowel Regimen:  - Bowel regimen when appropriate from surgical perspective    Discussed with surgical team and given her good pain control today, APS will sign off at this time  Thank you for the consult   All opioids and other analgesics to be written at discretion of primary team  Please contact Acute Pain Service - [facility] via Stance from [phone] with additional questions or concerns  See Jaxon Santana for additional contacts and after hours information  History of Present Illness    Admit Date:  4/26/2023  Hospital Day:  1 day  Primary Service:  Colorectal  Attending Provider:  Damari Marina MD  Reason for Consult / Principal Problem: postoperative analgesia    Current pain location(s): none  Pain Scale:   0/10  Quality: none  Current Analgesic regimen:  dPCA 0/0 2/5/2, Tylenol, gabapentin, robaxin    Pain History: none  Pain Management Provider:  none    I have reviewed the patient's controlled substance dispensing history in the Prescription Drug Monitoring Program in compliance with the [facility] regulations before prescribing any controlled substances  Consults    Review of Systems   Constitutional: Positive for fatigue  Negative for chills, diaphoresis and fever  HENT: Negative for nosebleeds, sore throat, tinnitus and trouble swallowing  Eyes: Negative for photophobia and visual disturbance  Respiratory: Negative for choking, chest tightness, shortness of breath and stridor  Cardiovascular: Negative for chest pain, palpitations and leg swelling  Gastrointestinal: Negative for abdominal distention, abdominal pain, nausea and vomiting  Genitourinary: Negative for flank pain and hematuria  Musculoskeletal: Negative for arthralgias, back pain, myalgias and neck pain  Skin: Negative for color change and pallor  Neurological: Negative for seizures, weakness, light-headedness and headaches  Hematological: Negative for adenopathy  Does not bruise/bleed easily  Psychiatric/Behavioral: Negative for agitation, confusion, decreased concentration and hallucinations  The patient is not nervous/anxious  All other systems reviewed and are negative        Historical Information   Past Medical History:   Diagnosis Date    Diverticulitis     Diverticulosis     Fibroids     Hypertension      Past Surgical History:   Procedure Laterality Date  COLONOSCOPY      TUBAL LIGATION  1991     Social History   Social History     Substance and Sexual Activity   Alcohol Use Not Currently    Alcohol/week: 3 0 standard drinks    Types: 3 Shots of liquor per week    Comment: socially     Social History     Substance and Sexual Activity   Drug Use Never     Social History     Tobacco Use   Smoking Status Every Day    Types: Cigars   Smokeless Tobacco Never     Family History: non-contributory    Meds/Allergies   all current active meds have been reviewed and current meds:   Current Facility-Administered Medications   Medication Dose Route Frequency    acetaminophen (TYLENOL) tablet 975 mg  975 mg Oral Q8H [address]    amLODIPine (NORVASC) tablet 10 mg  10 mg Oral Daily    dextrose 5 % and sodium chloride 0 45 % with KCl 20 mEq/L infusion  100 mL/hr Intravenous Continuous    gabapentin (NEURONTIN) capsule 100 mg  100 mg Oral TID    heparin (porcine) subcutaneous injection 7,500 Units  7,500 Units Subcutaneous Q8H [address]    HYDROmorphone (DILAUDID) 1 mg/mL 50 mL PCA   Intravenous Continuous    HYDROmorphone (DILAUDID) injection 0 5 mg  0 5 mg Intravenous Q3H PRN    lactated ringers bolus 1,000 mL  1,000 mL Intravenous Once PRN    And    lactated ringers bolus 1,000 mL  1,000 mL Intravenous Once PRN    methocarbamol (ROBAXIN) tablet 500 mg  500 mg Oral Q6H [address]    naloxone (NARCAN) 0 04 mg/mL syringe 0 04 mg  0 04 mg Intravenous Q1MIN PRN    ondansetron (ZOFRAN) injection 4 mg  4 mg Intravenous Q6H PRN    sodium chloride 0 9 % bolus 1,000 mL  1,000 mL Intravenous Once PRN    And    sodium chloride 0 9 % bolus 1,000 mL  1,000 mL Intravenous Once PRN       No Known Allergies    Objective   Temp:  [97 8 °F (36 6 °C)-99 7 °F (37 6 °C)] 99 7 °F (37 6 °C)  HR:  [104-114] 114  Resp:  [12-23] 18  BP: (123-152)/(73-92) 141/84  FiO2 (%):  [100] 100    Intake/Output Summary (Last 24 hours) at 4/27/2023 1119  Last data filed at 4/27/2023 0501  Gross per 24 hour   Intake 5850 ml   Output 1555 ml   Net 4295 ml       Physical Exam  Vitals and nursing note reviewed  Constitutional:       General: She is not in acute distress  Appearance: Normal appearance  She is obese  She is not ill-appearing, toxic-appearing or diaphoretic  HENT:      Head: Normocephalic and atraumatic  Nose: Nose normal       Mouth/Throat:      Mouth: Mucous membranes are moist       Pharynx: Oropharynx is clear  Eyes:      General: No scleral icterus  Conjunctiva/sclera: Conjunctivae normal    Cardiovascular:      Rate and Rhythm: Normal rate and regular rhythm  Pulses: Normal pulses  Heart sounds: Normal heart sounds  Pulmonary:      Effort: Pulmonary effort is normal  No respiratory distress  Breath sounds: Normal breath sounds  No wheezing  Abdominal:      General: Abdomen is flat  There is no distension  Palpations: Abdomen is soft  Tenderness: There is no abdominal tenderness  Comments: Incisions c/d/i   Musculoskeletal:      Cervical back: Normal range of motion and neck supple  No rigidity or tenderness  Skin:     General: Skin is warm and dry  Coloration: Skin is not jaundiced or pale  Neurological:      General: No focal deficit present  Mental Status: She is alert and oriented to person, place, and time  Mental status is at baseline  Sensory: No sensory deficit  Motor: No weakness  Psychiatric:         Mood and Affect: Mood normal          Behavior: Behavior normal          Thought Content: Thought content normal          Judgment: Judgment normal          Lab Results:   I have personally reviewed pertinent labs  , CBC:   Lab Results   Component Value Date    WBC 9 96 04/27/2023    HGB 11 9 04/27/2023    HCT 38 4 04/27/2023    MCV 92 04/27/2023     04/27/2023    MCH 28 5 04/27/2023    MCHC 31 0 (L) 04/27/2023    RDW 13 4 04/27/2023    MPV 11 1 04/27/2023    NRBC 0 04/27/2023   , CMP:   Lab Results   Component Value Date SODIUM 137 04/27/2023    K 3 9 04/27/2023     (H) 04/27/2023    CO2 22 04/27/2023    BUN 6 04/27/2023    CREATININE 0 58 (L) 04/27/2023    CALCIUM 8 7 04/27/2023    EGFR 105 04/27/2023   , PT/PTT:No results found for: PT, PTT    Imaging Studies: I have personally reviewed pertinent reports  EKG, Pathology, and Other Studies: I have personally reviewed pertinent reports  Counseling / Coordination of Care  Total floor / unit time spent today Level 1 = 20 minutes  Greater than 50% of total time was spent with the patient and / or family counseling and / or coordination of care  Please note that the APS provides consultative services regarding pain management only  With the exception of ketamine and epidural infusions and except when indicated, final decisions regarding starting or changing doses of analgesic medications are at the discretion of the consulting service  Off hours consultation and/or medication management is generally not available      Clementina Gray MD  Acute Pain Service

## 2023-04-27 NOTE — CASE MANAGEMENT
Case Management Assessment & Discharge Planning Note    Patient name Deandre Gutierrez  [facility] MRN [id number]  : 1970 Date 2023       Current Admission Date: 2023  Current Admission Diagnosis:Sigmoid diverticulitis   Patient Active Problem List    Diagnosis Date Noted    Large bowel obstruction ([address] ) 2023    Pain of upper abdomen 2022    Essential hypertension 2020    Sigmoid diverticulitis 2018      LOS (days): 1  Geometric Mean LOS (GMLOS) (days):   Days to GMLOS:     OBJECTIVE:    Risk of Unplanned Readmission Score: 10 4         Current admission status: Inpatient       Preferred Pharmacy:   [address] ([place]) [address]  Phone: [phone] Fax: [phone]    [facility] [address] [facility] [address]  [address] [address]  Phone: [phone] Fax: [phone]    Primary Care Provider: Ros Morales DO    Primary Insurance: BLUE CROSS  Secondary Insurance:     ASSESSMENT:  Raza Alejandra Proxies    There are no active Health Care Proxies on file  Patient Information  Admitted from[de-identified] Home  Mental Status: Alert  During Assessment patient was accompanied by: Other-Comment (fiance)  Assessment information provided by[de-identified] Patient  Primary Caregiver: Self  Support Systems: Spouse/significant other  Home entry access options   Select all that apply : Stairs  Number of steps to enter home : 2  Do the steps have railings?: No  Type of Current Residence: [facility]  In the last 12 months, was there a time when you were not able to pay the mortgage or rent on time?: No  In the last 12 months, was there a time when you did not have a steady place to sleep or slept in a shelter (including now)?: No  Homeless/housing insecurity resource given?: N/A  Living Arrangements: Lives w/ Spouse/significant other    Activities of Daily Living Prior to Admission  Functional Status: Independent  Completes ADLs independently?: Yes  Ambulates independently?: Yes  Does patient use assisted devices?: No  Does patient currently own DME?: No  Does patient have a history of Outpatient Therapy (PT/OT)?: No  Does the patient have a history of Short-Term Rehab?: No  Does patient have a history of HHC?: No  Does patient currently have [facility]?: No         Patient Information Continued  Income Source: Employed (short term disability)  Does patient have prescription coverage?: Yes  Within the past 12 months, you worried that your food would run out before you got the money to buy more : Never true  Within the past 12 months, the food you bought just didn't last and you didn't have money to get more : Never true  Food insecurity resource given?: N/A  Does patient receive dialysis treatments?: No  Does patient have a history of substance abuse?: No  Does patient have a history of Mental Health Diagnosis?: No         Means of Transportation  Means of Transport to Appts[de-identified] Family transport        DISCHARGE DETAILS:    Discharge planning discussed with[de-identified] patient and fiance at bedside  Freedom of Choice: Yes  Comments - Freedom of Choice: no preference        Did patient/caregiver verbalize understanding of patient care needs?: Yes  Were patient/caregiver advised of the risks associated with not following Treatment Team discharge recommendations?: Yes         [address]         Is the patient interested in [facility] at discharge?: Yes  [address] requested[de-identified] Nursing, Physical Therapy, Occupational Therapy  Home Health Services Needed[de-identified] Evaluate Functional Status and Safety, Gait/ADL Training, Post-Op Care and Assessment, Strengthening/Theraputic Exercises to Improve Function, Wound/Ostomy Care  Homebound Criteria Met[de-identified] Requires the Assistance of Another Person for Safe Ambulation or to Leave the Home    DME Referral Provided  Referral made for DME?: No (patient declines walker and bsc as recommended by therapy)    Other Referral/Resources/Interventions Provided:  Referral Comments: will place referral to [facility] with no choice per patient - will send blanket referral via Aidin

## 2023-04-27 NOTE — QUICK NOTE
Colorectal Surgery Post-Op Check  Sagrario Ruth 48 y o  female MRN: [id number]  Unit/Bed#: [facility] 820-01 Encounter: [id number]     S:   Patient seen and examined bedside  No acute complaints  States that she is enjoying her rest  Comfortable appearing  Ostomy with large amount of liquid stool leaking from bag  Ostomy appliance replaced bedside  GERÁMN drain with serosang output in bulb  Tachycardia noted  Denies chest pain or SOB  Bolus x 1 liter ordered for anticipated fluid losses  Patient denies nausea or emesis      O:   Vitals:    04/26/23 1958   BP: 123/86   Pulse: (!) 107   Resp: 20   Temp: 99 7 °F (37 6 °C)   SpO2: 93%     I/O       04/25 0701  04/26 0700 04/26 0701 04/27 0700    I V  (mL/kg)  2800 (33)    IV Piggyback  600    Total Intake(mL/kg)  3400 (40 1)    Urine (mL/kg/hr)  500    Emesis/NG output  5    Drains  90    Stool  250    Blood  100    Total Output  945    Net  +2455              PE:  Gen:  No acute distress  CV:  Warm, well-perfused  Lung:  Normal work of breathing, no respiratory distress on 2L NC  Abd:  Soft, appropriately tender, nondistended, incisions c/d/i, ostomy viable with large amount liquid stool per bag, GERMÁN serosang  Ext:  Moving all extremities  Neuro:  Alert and oriented, M/S grossly intact     Lab Results   Component Value Date    WBC 9 77 04/26/2023    HGB 13 6 04/26/2023    HCT 42 6 04/26/2023    MCV 90 04/26/2023     04/26/2023     Lab Results   Component Value Date    CALCIUM 7 8 (L) 04/26/2023    K 3 6 04/26/2023    CO2 25 04/26/2023     (H) 04/26/2023    BUN 7 04/26/2023    CREATININE 0 76 04/26/2023         A/P: 48 y o  female Day of Surgery s/p Procedure(s) (LRB):  LAPAROSCOPY, EXTENSIVE LYSIS ADHESIONS, MOBILIZATION SPLENIC FLEXURE, RESECTION COLON SIGMOID LAPAROSCOPIC (N/A)  RESECTION SMALL BOWEL  X 2 (N/A)  COLOSTOMY END (N/A)    Plan:   CLD    after 1L bolus   GREMÁN to bulb suction   Monitor ostomy output   DVT ppx   Out of bed, encourage ambulation   Encourage incentive spirometer use   Strict I's and O's   D-PCA - Pain and nausea control p r n     Please tiger text on call surgery resident for questions or concerns      Kisha Chou MD  PGY2, General Surgery

## 2023-04-28 ENCOUNTER — HOME HEALTH ADMISSION (OUTPATIENT)
Dept: HOME HEALTH SERVICES | Facility: HOME HEALTHCARE | Age: 53
End: 2023-04-28

## 2023-04-28 LAB
ANION GAP SERPL CALCULATED.3IONS-SCNC: 3 MMOL/L (ref 4–13)
BUN SERPL-MCNC: 5 MG/DL (ref 5–25)
CALCIUM SERPL-MCNC: 8.2 MG/DL (ref 8.3–10.1)
CHLORIDE SERPL-SCNC: 109 MMOL/L (ref 96–108)
CO2 SERPL-SCNC: 25 MMOL/L (ref 21–32)
CREAT SERPL-MCNC: 0.53 MG/DL (ref 0.6–1.3)
GFR SERPL CREATININE-BSD FRML MDRD: 108 ML/MIN/1.73SQ M
GLUCOSE SERPL-MCNC: 130 MG/DL (ref 65–140)
POTASSIUM SERPL-SCNC: 3.9 MMOL/L (ref 3.5–5.3)
SODIUM SERPL-SCNC: 137 MMOL/L (ref 135–147)

## 2023-04-28 RX ORDER — OXYCODONE HYDROCHLORIDE 10 MG/1
10 TABLET ORAL EVERY 4 HOURS PRN
Status: DISCONTINUED | OUTPATIENT
Start: 2023-04-28 | End: 2023-04-28

## 2023-04-28 RX ORDER — POTASSIUM CHLORIDE 20 MEQ/1
20 TABLET, EXTENDED RELEASE ORAL ONCE
Status: COMPLETED | OUTPATIENT
Start: 2023-04-28 | End: 2023-04-28

## 2023-04-28 RX ORDER — HYDROMORPHONE HCL IN WATER/PF 6 MG/30 ML
0.2 PATIENT CONTROLLED ANALGESIA SYRINGE INTRAVENOUS
Status: DISCONTINUED | OUTPATIENT
Start: 2023-04-28 | End: 2023-04-30

## 2023-04-28 RX ORDER — OXYCODONE HYDROCHLORIDE 5 MG/1
5 TABLET ORAL EVERY 4 HOURS PRN
Status: DISCONTINUED | OUTPATIENT
Start: 2023-04-28 | End: 2023-04-28

## 2023-04-28 RX ADMIN — HEPARIN SODIUM 7500 UNITS: 5000 INJECTION INTRAVENOUS; SUBCUTANEOUS at 13:16

## 2023-04-28 RX ADMIN — DEXTROSE, SODIUM CHLORIDE, AND POTASSIUM CHLORIDE 84 ML/HR: 5; .45; .15 INJECTION INTRAVENOUS at 09:24

## 2023-04-28 RX ADMIN — METHOCARBAMOL 500 MG: 500 TABLET ORAL at 17:37

## 2023-04-28 RX ADMIN — GABAPENTIN 100 MG: 100 CAPSULE ORAL at 08:04

## 2023-04-28 RX ADMIN — METHOCARBAMOL 500 MG: 500 TABLET ORAL at 05:00

## 2023-04-28 RX ADMIN — GABAPENTIN 100 MG: 100 CAPSULE ORAL at 17:37

## 2023-04-28 RX ADMIN — HEPARIN SODIUM 7500 UNITS: 5000 INJECTION INTRAVENOUS; SUBCUTANEOUS at 05:00

## 2023-04-28 RX ADMIN — OXYCODONE HYDROCHLORIDE 10 MG: 10 TABLET ORAL at 13:59

## 2023-04-28 RX ADMIN — HEPARIN SODIUM 7500 UNITS: 5000 INJECTION INTRAVENOUS; SUBCUTANEOUS at 22:06

## 2023-04-28 RX ADMIN — ACETAMINOPHEN 975 MG: 325 TABLET ORAL at 05:00

## 2023-04-28 RX ADMIN — OXYCODONE HYDROCHLORIDE 5 MG: 5 TABLET ORAL at 09:15

## 2023-04-28 RX ADMIN — HYDROMORPHONE HYDROCHLORIDE 0.5 MG: 1 INJECTION, SOLUTION INTRAMUSCULAR; INTRAVENOUS; SUBCUTANEOUS at 10:10

## 2023-04-28 RX ADMIN — ACETAMINOPHEN 975 MG: 325 TABLET ORAL at 13:13

## 2023-04-28 RX ADMIN — METHOCARBAMOL 500 MG: 500 TABLET ORAL at 11:07

## 2023-04-28 RX ADMIN — POTASSIUM CHLORIDE 20 MEQ: 1500 TABLET, EXTENDED RELEASE ORAL at 09:15

## 2023-04-28 RX ADMIN — AMLODIPINE BESYLATE 10 MG: 10 TABLET ORAL at 08:04

## 2023-04-28 NOTE — WOUND OSTOMY CARE
Progress Note- Ostomy  Turner Villarreal 48 y o  female  [id number]  [facility] 820-[facility] 820-01    Assessment:  Patient seen for ostomy follow-up  Primary RN placed 4 inch pouch after pouch from ostomy teaching was leaking  4 inch pouch is well adhered with no signs of leaking  Patient reports that pouch had not been changed since yesterday  Additional 4 inch pouches called up to the floor- primary RN made aware to drop off to patients room  Wound care will plan to see patient early next week/monday for next ostomy teaching  Patient reports no questions or concern at this time  AVS updated with discharge instructions  Ostomy Care:  -Stoma Measurement: 2 inches top to bottom and 2 5 inches side to side (Measure stoma weekly for next 6-8 weeks- stoma will decrease in size due to decrease in swelling)     -Appliance Used During Bag Change: Esteem One Piece Cut to Fit (Storeroom number: [id number])   -Accessories Used: 3M No Sting Skin Prep, 2 inch Dennise Ring     *Can shower with pouch on or off  Make sure to dry off pouch after shower  Bag Change Steps:  1  Peel back pouch using push-pull method, may use non-alcohol adhesive remover  Remove pouch from top to bottom  2  Use warm water only to cleanse skin around the stoma (bulmaro-stomal skin)  3  Make sure all adhesive residue is removed and skin is dry and not oily  4  Measure stoma size using measuring guide and trace correct measurements onto back of pouch  5  Then cut or mold the backing of pouch out to correct shape/size  6  Use 3M no sting barrier film to prep the skin around the stoma  (If the skin is open, moist or fragile, Crusting can be done now to create dry skin and assist with pouch adherence- steps are listed below)  7  Apply Dennise Ring to Creases at 3 o'clock and 9 o'clock to make a flat surface for the pouch to lay on   8  Place pouch over stoma and onto skin    9  Use warmth of hand to apply gentle pressure to help backing of pouch to adhere well to skin     TIPS:  Empty pouch when 1/3 -1/2 full  Change pouch every 4-5 days or if signs of leaking      Pallavi Hahn RN, BSN

## 2023-04-28 NOTE — PROGRESS NOTES
"Progress Note - Colorectal Surgery   Yazmin Mariscal 48 y o  female MRN: [id number]  Unit/Bed#: [facility] 820-01 Encounter: [id number]    Assessment:  Patient is a 48 y o  female w/ sigmoid stricture s/p lap hand assisted sigmoidectomy with end colostomy, SBRx2, HELENA 4/26    Ostomy: 75 cc  GERMÁN 125 ccSS    Plan:   Clears adv   IVF   PCA switch to PO   OOB/ambulate   Ostomy teaching   PT/OT- home health   Please TigerText on call [facility] Colorectal Role if any further questions    Subjective/Objective     Subjective:   No acute events overnight  Pain controlled  No N/v tolerated clears  Had ostomy teaching yesterday    Pertinent review of systems as above  All other review of systems negative  Objective:    Blood pressure 131/83, pulse 101, temperature 98 9 °F (37 2 °C), resp  rate 18, height 4' 9\" (1 448 m), weight 84 8 kg (187 lb), SpO2 91 %  ,Body mass index is 40 47 kg/m²  Intake/Output Summary (Last 24 hours) at 4/28/2023 0606  Last data filed at 4/28/2023 0430  Gross per 24 hour   Intake 906 67 ml   Output 2600 ml   Net -1693 33 ml       Invasive Devices     Peripheral Intravenous Line  Duration           Peripheral IV 04/26/23 Left Hand 1 day    Peripheral IV 04/26/23 Right Wrist 1 day          Drain  Duration           Closed/Suction Drain Right RLQ Bulb 1 day    Colostomy Transverse LUQ 1 day                Physical Exam:   Gen:  NAD  HEENT: NCAT  MMM  CV: well perfused, pulses palpable  Lungs: Normal respiratory effort  Abd: soft, nt/nd, incisionscdi, ostomy intact  Skin: warm/ dry  Extremities: no peripheral edema, no cyanosis  Neuro:  AxO x3        Results from last 7 days   Lab Units 04/27/23  0456 04/26/23  1742   WBC Thousand/uL 9 96 9 77   HEMOGLOBIN g/dL 11 9 13 6   HEMATOCRIT % 38 4 42 6   PLATELETS Thousands/uL 230 264     Results from last 7 days   Lab Units 04/27/23  0456 04/26/23  1742   POTASSIUM mmol/L 3 9 3 6   CHLORIDE mmol/L 110* 113*   CO2 mmol/L 22 25   BUN mg/dL 6 7 " CREATININE mg/dL 0 58* 0 76   CALCIUM mg/dL 8 7 7 8*            I have personally reviewed pertinent films in PACS      Medications:   Scheduled Meds:  Current Facility-Administered Medications   Medication Dose Route Frequency Provider Last Rate    acetaminophen  975 mg Oral [facility] Becki Woo MD      amLODIPine  10 mg Oral Daily Becki Woo MD      dextrose 5 % and sodium chloride 0 45 % with KCl 20 mEq/L  100 mL/hr Intravenous Continuous Carla Eagle PA-C 100 mL/hr (04/27/23 2330)    gabapentin  100 mg Oral TID Carla Eagle PA-C      heparin (porcine)  7,500 Units Subcutaneous [facility] Becki Woo MD      HYDROmorphone   Intravenous Continuous Becki Woo MD      HYDROmorphone  0 5 mg Intravenous Q3H PRN Godwin Jeffers MD      methocarbamol  500 mg Oral Q6H [address] Becki Woo MD      naloxone  0 04 mg Intravenous Q1MIN PRN Becki Woo MD      ondansetron  4 mg Intravenous Q6H PRN Becki Woo MD       Continuous Infusions:dextrose 5 % and sodium chloride 0 45 % with KCl 20 mEq/L, 100 mL/hr, Last Rate: 100 mL/hr (04/27/23 2330)  HYDROmorphone,       PRN Meds:  HYDROmorphone, 0 5 mg, Q3H PRN  naloxone, 0 04 mg, Q1MIN PRN  ondansetron, 4 mg, Q6H PRN      VTE Pharmacologic Prophylaxis: Heparin  VTE Mechanical Prophylaxis: sequential compression device

## 2023-04-28 NOTE — CASE MANAGEMENT
Case Management Discharge Planning Note    Patient name Zoila Sor  Location [facility] 820/[facility] 618-49 MRN [id number]  : 1970 Date 2023       Current Admission Date: 2023  Current Admission Diagnosis:Sigmoid diverticulitis   Patient Active Problem List    Diagnosis Date Noted    Large bowel obstruction ([address] ) 2023    Pain of upper abdomen 2022    Essential hypertension 2020    Sigmoid diverticulitis 2018      LOS (days): 2  Geometric Mean LOS (GMLOS) (days): 5 30  Days to GMLOS:3 6     OBJECTIVE:  Risk of Unplanned Readmission Score: 11 68         Current admission status: Inpatient   Preferred Pharmacy:   [address] ([address]  Phone: [phone] Fax: [phone]    [facility] [address] [facility] [place] [address]  Phone: [phone] Fax: [phone]    Primary Care Provider: Christian Severs, DO    Primary Insurance: BLUE CROSS  Secondary Insurance:     DISCHARGE DETAILS:             CM contacted family/caregiver?: Yes (fiance at bedside)           Other Referral/Resources/Interventions Provided:  Referral Comments: Tal Baker accepted for PT/OT/RN - declined DME         Treatment Team Recommendation: Home with  [facility]  Discharge Destination Plan[de-identified] Home  Transport at Discharge : Automobile, Family         IMM Given (Date)::  (N/A)      Per surgery - pt to dc tomorrow, Saturday   PT/OT/RN per MANPREET Chi updated, pt and fiance notified  Fiance to transport at time of dc  Pt has declined DME

## 2023-04-28 NOTE — CASE MANAGEMENT
Case Management Discharge Planning Note    Patient name Marichuy Brush  Location [facility] 820/[facility] 032-16 MRN [id number]  : 1970 Date 2023       Current Admission Date: 2023  Current Admission Diagnosis:Sigmoid diverticulitis   Patient Active Problem List    Diagnosis Date Noted    Large bowel obstruction ([address] ) 2023    Pain of upper abdomen 2022    Essential hypertension 2020    Sigmoid diverticulitis 2018      LOS (days): 2  Geometric Mean LOS (GMLOS) (days): 5 30  Days to GMLOS:3 7     OBJECTIVE:  Risk of Unplanned Readmission Score: 10 05         Current admission status: Inpatient   Preferred Pharmacy:   [address] ([place]) [address]  Phone: [phone] Fax: [phone] [address]  [place] [address]  Phone: [phone] Fax: [phone]    Primary Care Provider: Joao Bustamante DO    Primary Insurance: BLUE CROSS  Secondary Insurance:     DISCHARGE DETAILS:             [address] requested[de-identified] Nursing, Occupational Therapy, Physical [address] Name[de-identified] [facility] Provider[de-identified] PCP         [facility] in A[facility] for PT/OT/RN, AVS updated, patient updated at bedside  CM will continue to support

## 2023-04-28 NOTE — PLAN OF CARE
Problem: PAIN - ADULT  Goal: Verbalizes/displays adequate comfort level or baseline comfort level  Description: Interventions:  - Encourage patient to monitor pain and request assistance  - Assess pain using appropriate pain scale  - Administer analgesics based on type and severity of pain and evaluate response  - Implement non-pharmacological measures as appropriate and evaluate response  - Consider cultural and social influences on pain and pain management  - Notify physician/advanced practitioner if interventions unsuccessful or patient reports new pain  Outcome: Progressing     Problem: INFECTION - ADULT  Goal: Absence or prevention of progression during hospitalization  Description: INTERVENTIONS:  - Assess and monitor for signs and symptoms of infection  - Monitor lab/diagnostic results  - Monitor all insertion sites, i e  indwelling lines, tubes, and drains  - Monitor endotracheal if appropriate and nasal secretions for changes in amount and color  - [place] appropriate cooling/warming therapies per order  - Administer medications as ordered  - Instruct and encourage patient and family to use good hand hygiene technique  - Identify and instruct in appropriate isolation precautions for identified infection/condition  Outcome: Progressing

## 2023-04-29 LAB
ANION GAP SERPL CALCULATED.3IONS-SCNC: 2 MMOL/L (ref 4–13)
BUN SERPL-MCNC: 8 MG/DL (ref 5–25)
CALCIUM SERPL-MCNC: 8.6 MG/DL (ref 8.3–10.1)
CHLORIDE SERPL-SCNC: 106 MMOL/L (ref 96–108)
CO2 SERPL-SCNC: 30 MMOL/L (ref 21–32)
CREAT SERPL-MCNC: 0.5 MG/DL (ref 0.6–1.3)
GFR SERPL CREATININE-BSD FRML MDRD: 110 ML/MIN/1.73SQ M
GLUCOSE SERPL-MCNC: 98 MG/DL (ref 65–140)
MAGNESIUM SERPL-MCNC: 1.8 MG/DL (ref 1.6–2.6)
PHOSPHATE SERPL-MCNC: 3 MG/DL (ref 2.7–4.5)
POTASSIUM SERPL-SCNC: 3.7 MMOL/L (ref 3.5–5.3)
SODIUM SERPL-SCNC: 138 MMOL/L (ref 135–147)

## 2023-04-29 RX ORDER — SODIUM CHLORIDE, SODIUM LACTATE, POTASSIUM CHLORIDE, CALCIUM CHLORIDE 600; 310; 30; 20 MG/100ML; MG/100ML; MG/100ML; MG/100ML
125 INJECTION, SOLUTION INTRAVENOUS CONTINUOUS
Status: DISCONTINUED | OUTPATIENT
Start: 2023-04-29 | End: 2023-04-30

## 2023-04-29 RX ORDER — XYLITOL/YERBA SANTA
5 AEROSOL, SPRAY WITH PUMP (ML) MUCOUS MEMBRANE 4 TIMES DAILY PRN
Status: DISCONTINUED | OUTPATIENT
Start: 2023-04-29 | End: 2023-05-05 | Stop reason: HOSPADM

## 2023-04-29 RX ORDER — POTASSIUM CHLORIDE 14.9 MG/ML
20 INJECTION INTRAVENOUS ONCE
Status: COMPLETED | OUTPATIENT
Start: 2023-04-29 | End: 2023-04-30

## 2023-04-29 RX ORDER — MAGNESIUM SULFATE HEPTAHYDRATE 40 MG/ML
2 INJECTION, SOLUTION INTRAVENOUS ONCE
Status: COMPLETED | OUTPATIENT
Start: 2023-04-29 | End: 2023-04-30

## 2023-04-29 RX ADMIN — SODIUM CHLORIDE, SODIUM LACTATE, POTASSIUM CHLORIDE, AND CALCIUM CHLORIDE 125 ML/HR: .6; .31; .03; .02 INJECTION, SOLUTION INTRAVENOUS at 03:22

## 2023-04-29 RX ADMIN — HYDROMORPHONE HYDROCHLORIDE 0.2 MG: 0.2 INJECTION, SOLUTION INTRAMUSCULAR; INTRAVENOUS; SUBCUTANEOUS at 22:02

## 2023-04-29 RX ADMIN — HEPARIN SODIUM 7500 UNITS: 5000 INJECTION INTRAVENOUS; SUBCUTANEOUS at 13:25

## 2023-04-29 RX ADMIN — HYDROMORPHONE HYDROCHLORIDE 0.2 MG: 0.2 INJECTION, SOLUTION INTRAMUSCULAR; INTRAVENOUS; SUBCUTANEOUS at 07:51

## 2023-04-29 RX ADMIN — HEPARIN SODIUM 7500 UNITS: 5000 INJECTION INTRAVENOUS; SUBCUTANEOUS at 21:07

## 2023-04-29 RX ADMIN — SODIUM CHLORIDE, SODIUM LACTATE, POTASSIUM CHLORIDE, AND CALCIUM CHLORIDE 1000 ML: .6; .31; .03; .02 INJECTION, SOLUTION INTRAVENOUS at 02:29

## 2023-04-29 RX ADMIN — HEPARIN SODIUM 7500 UNITS: 5000 INJECTION INTRAVENOUS; SUBCUTANEOUS at 05:12

## 2023-04-29 RX ADMIN — POTASSIUM CHLORIDE 20 MEQ: 14.9 INJECTION, SOLUTION INTRAVENOUS at 10:38

## 2023-04-29 RX ADMIN — MAGNESIUM SULFATE HEPTAHYDRATE 2 G: 40 INJECTION, SOLUTION INTRAVENOUS at 13:19

## 2023-04-29 RX ADMIN — SODIUM CHLORIDE, SODIUM LACTATE, POTASSIUM CHLORIDE, AND CALCIUM CHLORIDE 125 ML/HR: .6; .31; .03; .02 INJECTION, SOLUTION INTRAVENOUS at 12:00

## 2023-04-29 RX ADMIN — HYDROMORPHONE HYDROCHLORIDE 0.2 MG: 0.2 INJECTION, SOLUTION INTRAMUSCULAR; INTRAVENOUS; SUBCUTANEOUS at 18:47

## 2023-04-29 NOTE — PROGRESS NOTES
"Progress Note - Colorectal Surgery   Patricia Wu 48 y o  female MRN: [id number]  Unit/Bed#: [facility] 820-01 Encounter: [id number]    Assessment:  Patient is a 48 y o  female w/ sigmoid stricture s/p lap hand assisted sigmoidectomy with end colostomy, SBRx2, HELENA 4/26    3 L nasal cannula  NG tube placed overnight for multiple episodes of emesis: Emesis/NG tube output total of 2 L  Received 1 L bolus for fluid losses  Right upper quadrant 100 cc serosanguineous  Ostomy 125 cc both liquid and formed stool    Plan:   N p o  IV fluids   Monitor output   Pain and nausea control as needed   Strict intake and output measurements   OOB/ambulate   Ostomy teaching   PT/OT- home health   Please TigerText on call [facility] Colorectal Role if any further questions    Subjective/Objective     Subjective:   Patient seen and examined bedside  Patient states after eating pizza yesterday for lunch she began feeling nauseous and had multiple episodes of emesis  She states her nausea is now resolved after NG tube placed  No fevers or chills  Pain is well controlled  Denies appetite at this time  Objective:    Blood pressure 137/91, pulse 96, temperature 98 °F (36 7 °C), resp  rate 19, height 4' 9\" (1 448 m), weight 84 8 kg (187 lb), SpO2 (!) 86 %  ,Body mass index is 40 47 kg/m²        Intake/Output Summary (Last 24 hours) at 4/29/2023 0816  Last data filed at 4/29/2023 0701  Gross per 24 hour   Intake --   Output 3400 ml   Net -3400 ml       Invasive Devices     Peripheral Intravenous Line  Duration           Peripheral IV 04/29/23 Left Antecubital <1 day          Drain  Duration           Closed/Suction Drain Right RLQ Bulb 2 days    Colostomy Transverse LUQ 2 days    NG/OG/Enteral Tube Nasogastric 18 Fr Right nare <1 day                Physical Exam:   General - no acute distress, responsive and cooperative  CV - warm, regular rate  Pulm - normal work of breathing, no respiratory distress on 3 L nasal " cannula  Abd - soft, nondistended, incisions clean dry and intact, ostomy viable with formed stool and liquid per bag, lower abdominal incision with some drainage  Neuro - m/s grossly intact, cn grossly intact  Ext - moving all extremities        Results from last 7 days   Lab Units 04/27/23  0456 04/26/23  1742   WBC Thousand/uL 9 96 9 77   HEMOGLOBIN g/dL 11 9 13 6   HEMATOCRIT % 38 4 42 6   PLATELETS Thousands/uL 230 264     Results from last 7 days   Lab Units 04/29/23  0318 04/28/23  0555 04/27/23  0456   POTASSIUM mmol/L 3 7 3 9 3 9   CHLORIDE mmol/L 106 109* 110*   CO2 mmol/L 30 25 22   BUN mg/dL 8 5 6   CREATININE mg/dL 0 50* 0 53* 0 58*   CALCIUM mg/dL 8 6 8 2* 8 7            I have personally reviewed pertinent films in PACS      Medications:   Scheduled Meds:  Current Facility-Administered Medications   Medication Dose Route Frequency Provider Last Rate    acetaminophen  975 mg Oral [facility] Genie Palmer MD      amLODIPine  10 mg Oral Daily Genie Palmer MD      gabapentin  100 mg Oral TID Cheryl Lozano PA-C      heparin (porcine)  7,500 Units Subcutaneous [facility] Genie Palmer MD      HYDROmorphone  0 5 mg Intravenous Q3H PRN Amanda Engel MD      HYDROmorphone  0 2 mg Intravenous Q3H PRN Amanda Engel MD      lactated ringers  125 mL/hr Intravenous Continuous Amanda Engel  mL/hr (04/29/23 0322)    methocarbamol  500 mg Oral Q6H [address] Genie Palmer MD      naloxone  0 04 mg Intravenous Q1MIN PRN Genie Palmer MD      ondansetron  4 mg Intravenous Q6H PRN Genie Palmer MD       Continuous Infusions:lactated ringers, 125 mL/hr, Last Rate: 125 mL/hr (04/29/23 0322)      PRN Meds:  HYDROmorphone, 0 5 mg, Q3H PRN  HYDROmorphone, 0 2 mg, Q3H PRN  naloxone, 0 04 mg, Q1MIN PRN  ondansetron, 4 mg, Q6H PRN      VTE Pharmacologic Prophylaxis: Heparin  VTE Mechanical Prophylaxis: sequential compression device

## 2023-04-29 NOTE — PLAN OF CARE
Problem: PAIN - ADULT  Goal: Verbalizes/displays adequate comfort level or baseline comfort level  Description: Interventions:  - Encourage patient to monitor pain and request assistance  - Assess pain using appropriate pain scale  - Administer analgesics based on type and severity of pain and evaluate response  - Implement non-pharmacological measures as appropriate and evaluate response  - Consider cultural and social influences on pain and pain management  - Notify physician/advanced practitioner if interventions unsuccessful or patient reports new pain  Outcome: Progressing     Problem: INFECTION - ADULT  Goal: Absence or prevention of progression during hospitalization  Description: INTERVENTIONS:  - Assess and monitor for signs and symptoms of infection  - Monitor lab/diagnostic results  - Monitor all insertion sites, i e  indwelling lines, tubes, and drains  - Monitor endotracheal if appropriate and nasal secretions for changes in amount and color  - [place] appropriate cooling/warming therapies per order  - Administer medications as ordered  - Instruct and encourage patient and family to use good hand hygiene technique  - Identify and instruct in appropriate isolation precautions for identified infection/condition  Outcome: Progressing

## 2023-04-30 LAB
ANION GAP SERPL CALCULATED.3IONS-SCNC: 5 MMOL/L (ref 4–13)
BUN SERPL-MCNC: 6 MG/DL (ref 5–25)
CALCIUM SERPL-MCNC: 7.9 MG/DL (ref 8.3–10.1)
CHLORIDE SERPL-SCNC: 108 MMOL/L (ref 96–108)
CO2 SERPL-SCNC: 23 MMOL/L (ref 21–32)
CREAT SERPL-MCNC: 0.35 MG/DL (ref 0.6–1.3)
GFR SERPL CREATININE-BSD FRML MDRD: 124 ML/MIN/1.73SQ M
GLUCOSE SERPL-MCNC: 89 MG/DL (ref 65–140)
MAGNESIUM SERPL-MCNC: 2.5 MG/DL (ref 1.6–2.6)
POTASSIUM SERPL-SCNC: 3.3 MMOL/L (ref 3.5–5.3)
SODIUM SERPL-SCNC: 136 MMOL/L (ref 135–147)

## 2023-04-30 RX ORDER — CEPHALEXIN 500 MG/1
500 CAPSULE ORAL EVERY 6 HOURS SCHEDULED
Status: DISCONTINUED | OUTPATIENT
Start: 2023-04-30 | End: 2023-05-01

## 2023-04-30 RX ORDER — OXYCODONE HYDROCHLORIDE 5 MG/1
5 TABLET ORAL EVERY 4 HOURS PRN
Status: DISCONTINUED | OUTPATIENT
Start: 2023-04-30 | End: 2023-05-05 | Stop reason: HOSPADM

## 2023-04-30 RX ORDER — POTASSIUM CHLORIDE 14.9 MG/ML
20 INJECTION INTRAVENOUS
Status: COMPLETED | OUTPATIENT
Start: 2023-04-30 | End: 2023-04-30

## 2023-04-30 RX ORDER — DEXTROSE, SODIUM CHLORIDE, AND POTASSIUM CHLORIDE 5; .45; .15 G/100ML; G/100ML; G/100ML
75 INJECTION INTRAVENOUS CONTINUOUS
Status: DISCONTINUED | OUTPATIENT
Start: 2023-04-30 | End: 2023-05-01

## 2023-04-30 RX ORDER — OXYCODONE HYDROCHLORIDE 10 MG/1
10 TABLET ORAL EVERY 4 HOURS PRN
Status: DISCONTINUED | OUTPATIENT
Start: 2023-04-30 | End: 2023-05-05 | Stop reason: HOSPADM

## 2023-04-30 RX ADMIN — POTASSIUM CHLORIDE 20 MEQ: 14.9 INJECTION, SOLUTION INTRAVENOUS at 11:09

## 2023-04-30 RX ADMIN — HEPARIN SODIUM 7500 UNITS: 5000 INJECTION INTRAVENOUS; SUBCUTANEOUS at 21:50

## 2023-04-30 RX ADMIN — HEPARIN SODIUM 7500 UNITS: 5000 INJECTION INTRAVENOUS; SUBCUTANEOUS at 04:43

## 2023-04-30 RX ADMIN — METHOCARBAMOL 500 MG: 500 TABLET ORAL at 17:44

## 2023-04-30 RX ADMIN — METHOCARBAMOL 500 MG: 500 TABLET ORAL at 11:13

## 2023-04-30 RX ADMIN — POTASSIUM CHLORIDE 20 MEQ: 14.9 INJECTION, SOLUTION INTRAVENOUS at 08:53

## 2023-04-30 RX ADMIN — POTASSIUM CHLORIDE 20 MEQ: 14.9 INJECTION, SOLUTION INTRAVENOUS at 13:18

## 2023-04-30 RX ADMIN — HYDROMORPHONE HYDROCHLORIDE 0.2 MG: 0.2 INJECTION, SOLUTION INTRAMUSCULAR; INTRAVENOUS; SUBCUTANEOUS at 10:44

## 2023-04-30 RX ADMIN — ACETAMINOPHEN 975 MG: 325 TABLET ORAL at 21:50

## 2023-04-30 RX ADMIN — GABAPENTIN 100 MG: 100 CAPSULE ORAL at 21:50

## 2023-04-30 RX ADMIN — DEXTROSE, SODIUM CHLORIDE, AND POTASSIUM CHLORIDE 75 ML/HR: 5; .45; .15 INJECTION INTRAVENOUS at 21:52

## 2023-04-30 RX ADMIN — HYDROMORPHONE HYDROCHLORIDE 0.2 MG: 0.2 INJECTION, SOLUTION INTRAMUSCULAR; INTRAVENOUS; SUBCUTANEOUS at 04:42

## 2023-04-30 RX ADMIN — ACETAMINOPHEN 975 MG: 325 TABLET ORAL at 13:17

## 2023-04-30 RX ADMIN — DEXTROSE, SODIUM CHLORIDE, AND POTASSIUM CHLORIDE 75 ML/HR: 5; .45; .15 INJECTION INTRAVENOUS at 08:51

## 2023-04-30 RX ADMIN — CEPHALEXIN 500 MG: 500 CAPSULE ORAL at 21:51

## 2023-04-30 RX ADMIN — HYDROMORPHONE HYDROCHLORIDE 0.2 MG: 0.2 INJECTION, SOLUTION INTRAMUSCULAR; INTRAVENOUS; SUBCUTANEOUS at 20:36

## 2023-04-30 RX ADMIN — SODIUM CHLORIDE, SODIUM LACTATE, POTASSIUM CHLORIDE, AND CALCIUM CHLORIDE 125 ML/HR: .6; .31; .03; .02 INJECTION, SOLUTION INTRAVENOUS at 04:42

## 2023-04-30 RX ADMIN — HEPARIN SODIUM 7500 UNITS: 5000 INJECTION INTRAVENOUS; SUBCUTANEOUS at 13:17

## 2023-04-30 RX ADMIN — GABAPENTIN 100 MG: 100 CAPSULE ORAL at 17:44

## 2023-04-30 NOTE — PROGRESS NOTES
"Progress Note - Colorectal Surgery   Katalina Lezama 48 y o  female MRN: [id number]  Unit/Bed#: [facility] 820-01 Encounter: [id number]    Assessment:  Katalina Lezama is a 48 y o  female w/ sigmoid stricture s/p lap hand assisted sigmoidectomy with end colostomy, SBRx2, HELENA 4/26  Course c/b ileus, s/p NGT removal 4/30    On 2L NC  UOP 1 25L  Right GERMÁN 200 cc serosanguineous  Ostomy 300 cc both liquid and formed stool    Plan:   CLD, consider advancing    Monitor output   Pain and nausea control as needed   Strict intake and output measurements   OOB/ambulate   Ostomy teaching   PT/OT- home health   Please TigerText on call [facility] Colorectal Role if any further questions      Subjective/Objective     Subjective:   Patient seen and examined bedside  Endorses some abdominal pain when coughing  OOB multiple times  No nausea or emesis  Objective:    Blood pressure 161/98, pulse 90, temperature 98 °F (36 7 °C), resp  rate 22, height 4' 9\" (1 448 m), weight 84 8 kg (187 lb), SpO2 94 %  ,Body mass index is 40 47 kg/m²        Intake/Output Summary (Last 24 hours) at 5/1/2023 0640  Last data filed at 5/1/2023 9734  Gross per 24 hour   Intake 500 ml   Output 2000 ml   Net -1500 ml       Invasive Devices     Peripheral Intravenous Line  Duration           Peripheral IV 04/29/23 Left Antecubital 2 days          Drain  Duration           Closed/Suction Drain Right RLQ Bulb 4 days    Colostomy Transverse LUQ 4 days                Physical Exam:   General - no acute distress, responsive and cooperative  CV - warm, regular rate  Pulm - normal work of breathing, no respiratory distress on 2L nasal cannula  Abd - soft, nondistended, incisions clean dry and intact, ostomy viable with formed stool and liquid per bag, lower abdominal incision with minimal drainage and blanching erythema, midline with skin dehiscence inferior, GERMÁN serosang  Neuro - m/s grossly intact, cn grossly intact  Ext - moving all " extremities        Results from last 7 days   Lab Units 04/27/23  0456 04/26/23  1742   WBC Thousand/uL 9 96 9 77   HEMOGLOBIN g/dL 11 9 13 6   HEMATOCRIT % 38 4 42 6   PLATELETS Thousands/uL 230 264     Results from last 7 days   Lab Units 05/01/23  0433 04/30/23  0520 04/29/23  0318   POTASSIUM mmol/L 3 8 3 3* 3 7   CHLORIDE mmol/L 107 108 106   CO2 mmol/L 27 23 30   BUN mg/dL 4* 6 8   CREATININE mg/dL 0 46* 0 35* 0 50*   CALCIUM mg/dL 8 5 7 9* 8 6            I have personally reviewed pertinent films in PACS      Medications:   Scheduled Meds:  Current Facility-Administered Medications   Medication Dose Route Frequency Provider Last Rate    acetaminophen  975 mg Oral [facility] Leonor Dejesus MD      amLODIPine  10 mg Oral Daily Leonor Dejesus MD      cephalexin  500 mg Oral Q6H Mateo Cueto MD      dextrose 5 % and sodium chloride 0 45 % with KCl 20 mEq/L  75 mL/hr Intravenous Continuous Steevn Todd MD 75 mL/hr (04/30/23 2152)    gabapentin  100 mg Oral TID Dorita Jain PA-C      heparin (porcine)  7,500 Units Subcutaneous [facility] Leonor Dejesus MD      HYDROmorphone  0 5 mg Intravenous Q3H PRN Steven Todd MD      methocarbamol  500 mg Oral Q6H [address] Leonor Dejesus MD      naloxone  0 04 mg Intravenous Q1MIN PRN Leonor Dejesus MD      ondansetron  4 mg Intravenous Q6H PRN Leonor Dejesus MD      oxyCODONE  10 mg Oral Q4H PRN Steven Todd MD      oxyCODONE  5 mg Oral Q4H PRN Steven Todd MD      phenol  1 spray Mouth/Throat Q2H PRN Kilo Garcia MD      saliva substitute  5 spray Mouth/Throat 4x Daily PRN Kilo Garcia MD       Continuous Infusions:dextrose 5 % and sodium chloride 0 45 % with KCl 20 mEq/L, 75 mL/hr, Last Rate: 75 mL/hr (04/30/23 2152)      PRN Meds:  HYDROmorphone, 0 5 mg, Q3H PRN  naloxone, 0 04 mg, Q1MIN PRN  ondansetron, 4 mg, Q6H PRN  oxyCODONE, 10 mg, Q4H PRN  oxyCODONE, 5 mg, Q4H PRN  phenol, 1 spray, Q2H PRN  saliva substitute, 5 spray, 4x Daily PRN      VTE Pharmacologic Prophylaxis: Heparin  VTE Mechanical Prophylaxis: sequential compression device

## 2023-04-30 NOTE — PROGRESS NOTES
"Progress Note - Colorectal Surgery   Katalina Lezama 48 y o  female MRN: [id number]  Unit/Bed#: [facility] 820-01 Encounter: [id number]    Assessment:  Patient is a 48 y o  female w/ sigmoid stricture s/p lap hand assisted sigmoidectomy with end colostomy, SBRx2, HELENA 4/26    2 L nasal cannula  NG tube with 1 25L thin bilious tinged   UOP 1 85L  Right upper quadrant  315 cc serosanguineous  Ostomy 150 cc both liquid and formed stool    Plan:   N p o  / NGT / IV fluids  o Consider D/c NGT if output is low   Monitor output   Pain and nausea control as needed   Strict intake and output measurements   OOB/ambulate   Ostomy teaching   PT/OT- home health   Please TigerText on call [facility] Colorectal Role if any further questions    Subjective/Objective     Subjective:   Patient seen and examined bedside  No overnight events  Denies nausea  Ostomy viable  No fevers or chills  Objective:    Blood pressure 144/87, pulse 94, temperature 98 3 °F (36 8 °C), resp  rate 16, height 4' 9\" (1 448 m), weight 84 8 kg (187 lb), SpO2 (!) 89 %  ,Body mass index is 40 47 kg/m²        Intake/Output Summary (Last 24 hours) at 4/30/2023 0802  Last data filed at 4/30/2023 0446  Gross per 24 hour   Intake 1079 17 ml   Output 2915 ml   Net -1835 83 ml       Invasive Devices     Peripheral Intravenous Line  Duration           Peripheral IV 04/29/23 Left Antecubital 1 day          Drain  Duration           Closed/Suction Drain Right RLQ Bulb 3 days    Colostomy Transverse LUQ 3 days    NG/OG/Enteral Tube Nasogastric 18 Fr Right nare 1 day                Physical Exam:   General - no acute distress, responsive and cooperative  CV - warm, regular rate  Pulm - normal work of breathing, no respiratory distress on 2L nasal cannula  Abd - soft, nondistended, incisions clean dry and intact, ostomy viable with formed stool and liquid per bag, lower abdominal incision with minimal drainage, GERMÁN serosang  Neuro - m/s grossly intact, cn " grossly intact  Ext - moving all extremities        Results from last 7 days   Lab Units 04/27/23  0456 04/26/23  1742   WBC Thousand/uL 9 96 9 77   HEMOGLOBIN g/dL 11 9 13 6   HEMATOCRIT % 38 4 42 6   PLATELETS Thousands/uL 230 264     Results from last 7 days   Lab Units 04/30/23  0520 04/29/23  0318 04/28/23  0555   POTASSIUM mmol/L 3 3* 3 7 3 9   CHLORIDE mmol/L 108 106 109*   CO2 mmol/L 23 30 25   BUN mg/dL 6 8 5   CREATININE mg/dL 0 35* 0 50* 0 53*   CALCIUM mg/dL 7 9* 8 6 8 2*            I have personally reviewed pertinent films in PACS      Medications:   Scheduled Meds:  Current Facility-Administered Medications   Medication Dose Route Frequency Provider Last Rate    acetaminophen  975 mg Oral [facility] Dionne Reynoso MD      amLODIPine  10 mg Oral Daily Dionne Reynoso MD      gabapentin  100 mg Oral TID Ender Andrew PA-C      heparin (porcine)  7,500 Units Subcutaneous [facility] Dionne Reynoso MD      HYDROmorphone  0 5 mg Intravenous Q3H PRN Ace Erickson MD      HYDROmorphone  0 2 mg Intravenous Q3H PRN Ace Erickson MD      lactated ringers  125 mL/hr Intravenous Continuous Ace Erickson  mL/hr (04/30/23 0442)    methocarbamol  500 mg Oral Q6H [address] Dionne Reynoso MD      naloxone  0 04 mg Intravenous Q1MIN PRN Dionne Reynoso MD      ondansetron  4 mg Intravenous Q6H PRN Dionne Reynoso MD      phenol  1 spray Mouth/Throat Q2H PRN Natalie Amezcua MD      saliva substitute  5 spray Mouth/Throat 4x Daily PRN Natalie Amezcua MD       Continuous Infusions:lactated ringers, 125 mL/hr, Last Rate: 125 mL/hr (04/30/23 0442)      PRN Meds:  HYDROmorphone, 0 5 mg, Q3H PRN  HYDROmorphone, 0 2 mg, Q3H PRN  naloxone, 0 04 mg, Q1MIN PRN  ondansetron, 4 mg, Q6H PRN  phenol, 1 spray, Q2H PRN  saliva substitute, 5 spray, 4x Daily PRN      VTE Pharmacologic Prophylaxis: Heparin  VTE Mechanical Prophylaxis: sequential compression device

## 2023-04-30 NOTE — PROGRESS NOTES
Upon emptying the GERMÁN bulb the patient's midline incision had scant drainage  Dr Juan David Yanez is aware  Will continue to monitor

## 2023-05-01 LAB
ANION GAP SERPL CALCULATED.3IONS-SCNC: 1 MMOL/L (ref 4–13)
BASOPHILS # BLD AUTO: 0.06 THOUSANDS/ΜL (ref 0–0.1)
BASOPHILS NFR BLD AUTO: 1 % (ref 0–1)
BUN SERPL-MCNC: 4 MG/DL (ref 5–25)
CALCIUM SERPL-MCNC: 8.5 MG/DL (ref 8.3–10.1)
CHLORIDE SERPL-SCNC: 107 MMOL/L (ref 96–108)
CO2 SERPL-SCNC: 27 MMOL/L (ref 21–32)
CREAT SERPL-MCNC: 0.46 MG/DL (ref 0.6–1.3)
EOSINOPHIL # BLD AUTO: 0.17 THOUSAND/ΜL (ref 0–0.61)
EOSINOPHIL NFR BLD AUTO: 2 % (ref 0–6)
ERYTHROCYTE [DISTWIDTH] IN BLOOD BY AUTOMATED COUNT: 13.1 % (ref 11.6–15.1)
GFR SERPL CREATININE-BSD FRML MDRD: 113 ML/MIN/1.73SQ M
GLUCOSE SERPL-MCNC: 135 MG/DL (ref 65–140)
HCT VFR BLD AUTO: 37.9 % (ref 34.8–46.1)
HGB BLD-MCNC: 12 G/DL (ref 11.5–15.4)
IMM GRANULOCYTES # BLD AUTO: 0.12 THOUSAND/UL (ref 0–0.2)
IMM GRANULOCYTES NFR BLD AUTO: 1 % (ref 0–2)
LYMPHOCYTES # BLD AUTO: 0.95 THOUSANDS/ΜL (ref 0.6–4.47)
LYMPHOCYTES NFR BLD AUTO: 10 % (ref 14–44)
MCH RBC QN AUTO: 28.2 PG (ref 26.8–34.3)
MCHC RBC AUTO-ENTMCNC: 31.7 G/DL (ref 31.4–37.4)
MCV RBC AUTO: 89 FL (ref 82–98)
MONOCYTES # BLD AUTO: 1.25 THOUSAND/ΜL (ref 0.17–1.22)
MONOCYTES NFR BLD AUTO: 13 % (ref 4–12)
NEUTROPHILS # BLD AUTO: 6.88 THOUSANDS/ΜL (ref 1.85–7.62)
NEUTS SEG NFR BLD AUTO: 73 % (ref 43–75)
NRBC BLD AUTO-RTO: 0 /100 WBCS
PLATELET # BLD AUTO: 325 THOUSANDS/UL (ref 149–390)
PMV BLD AUTO: 10.5 FL (ref 8.9–12.7)
POTASSIUM SERPL-SCNC: 3.8 MMOL/L (ref 3.5–5.3)
RBC # BLD AUTO: 4.26 MILLION/UL (ref 3.81–5.12)
SODIUM SERPL-SCNC: 135 MMOL/L (ref 135–147)
WBC # BLD AUTO: 9.43 THOUSAND/UL (ref 4.31–10.16)

## 2023-05-01 RX ORDER — CEFAZOLIN SODIUM 1 G/50ML
1000 SOLUTION INTRAVENOUS EVERY 8 HOURS
Status: DISCONTINUED | OUTPATIENT
Start: 2023-05-01 | End: 2023-05-05 | Stop reason: HOSPADM

## 2023-05-01 RX ORDER — DEXTROSE, SODIUM CHLORIDE, AND POTASSIUM CHLORIDE 5; .45; .15 G/100ML; G/100ML; G/100ML
50 INJECTION INTRAVENOUS CONTINUOUS
Status: DISCONTINUED | OUTPATIENT
Start: 2023-05-01 | End: 2023-05-03

## 2023-05-01 RX ADMIN — OXYCODONE HYDROCHLORIDE 10 MG: 10 TABLET ORAL at 01:15

## 2023-05-01 RX ADMIN — CEFAZOLIN SODIUM 1000 MG: 1 SOLUTION INTRAVENOUS at 08:26

## 2023-05-01 RX ADMIN — ACETAMINOPHEN 975 MG: 325 TABLET ORAL at 05:34

## 2023-05-01 RX ADMIN — GABAPENTIN 100 MG: 100 CAPSULE ORAL at 08:26

## 2023-05-01 RX ADMIN — HEPARIN SODIUM 7500 UNITS: 5000 INJECTION INTRAVENOUS; SUBCUTANEOUS at 05:34

## 2023-05-01 RX ADMIN — AMLODIPINE BESYLATE 10 MG: 10 TABLET ORAL at 08:26

## 2023-05-01 RX ADMIN — DEXTROSE, SODIUM CHLORIDE, AND POTASSIUM CHLORIDE 100 ML/HR: 5; .45; .15 INJECTION INTRAVENOUS at 08:31

## 2023-05-01 RX ADMIN — HEPARIN SODIUM 7500 UNITS: 5000 INJECTION INTRAVENOUS; SUBCUTANEOUS at 14:30

## 2023-05-01 RX ADMIN — ONDANSETRON 4 MG: 2 INJECTION INTRAMUSCULAR; INTRAVENOUS at 07:18

## 2023-05-01 RX ADMIN — CEFAZOLIN SODIUM 1000 MG: 1 SOLUTION INTRAVENOUS at 17:18

## 2023-05-01 RX ADMIN — DEXTROSE, SODIUM CHLORIDE, AND POTASSIUM CHLORIDE 100 ML/HR: 5; .45; .15 INJECTION INTRAVENOUS at 10:22

## 2023-05-01 RX ADMIN — ONDANSETRON 4 MG: 2 INJECTION INTRAMUSCULAR; INTRAVENOUS at 12:46

## 2023-05-01 RX ADMIN — OXYCODONE HYDROCHLORIDE 10 MG: 10 TABLET ORAL at 11:04

## 2023-05-01 RX ADMIN — CEPHALEXIN 500 MG: 500 CAPSULE ORAL at 05:34

## 2023-05-01 RX ADMIN — METHOCARBAMOL 500 MG: 500 TABLET ORAL at 05:34

## 2023-05-01 RX ADMIN — METHOCARBAMOL 500 MG: 500 TABLET ORAL at 11:04

## 2023-05-01 RX ADMIN — DEXTROSE, SODIUM CHLORIDE, AND POTASSIUM CHLORIDE 100 ML/HR: 5; .45; .15 INJECTION INTRAVENOUS at 21:06

## 2023-05-01 NOTE — NURSING NOTE
Upon changing pts ostomy since it was leaking upon initial shift assessment, noted midline punture( trocar?) site at umbilicus, that was covered by ostomy prior  Left this CYRUS so not to cover again by ostomy bag, tt(with picture as requested) sent to Dr Will Kulkarni to make aware of this  DSD placed in meantime since small amt purulent drainage noted

## 2023-05-01 NOTE — CASE MANAGEMENT
Case Management Discharge Planning Note    Patient name Jyoti Negron  Location [facility] 820/[facility] 449-15 MRN [id number]  : 1970 Date 2023       Current Admission Date: 2023  Current Admission Diagnosis:Sigmoid diverticulitis   Patient Active Problem List    Diagnosis Date Noted    Large bowel obstruction ([address] ) 2023    Pain of upper abdomen 2022    Essential hypertension 2020    Sigmoid diverticulitis 2018      LOS (days): 5  Geometric Mean LOS (GMLOS) (days): 5 30  Days to GMLOS:0 6     OBJECTIVE:  Risk of Unplanned Readmission Score: 11 04         Current admission status: Inpatient   Preferred Pharmacy:   [address] ([facility]) [place], [address] [facility] 63  [address]  Phone: [phone] Fax: [phone]    [facility] [address] [facility] [address] [place] [address]  Phone: [phone] Fax: [phone]    Primary Care Provider: Naomi Chavez DO    Primary Insurance: BLUE CROSS  Secondary Insurance:     DISCHARGE DETAILS:    Per surgery this date - patient with emesis, will dc on  or Thursday  [place] vendor updated via [employer]

## 2023-05-01 NOTE — WOUND OSTOMY CARE
Progress Note - Wound   Magdy Weston 48 y o  female MRN: [id number]  Unit/Bed#: [facility] 820-01 Encounter: [id number]        Assessment:   Patient is seen for wound care/ ostomy follow-up  Patient reports that ostomy pouch was changed yesterday and had not been leaking  [facility] RN viewed pouch, no signs of leaking noted, pouch well adhered  Patient states no questions or concerns with ostomy care at this time  Colorectal team messaged [phone] RNs stating that pouch was leaking earlier this morning  Wound care team rounded on patient again later in the morning- pouch remains well adhered with no signs of leaking  Wound care will see patient again on Wednesday for next ostomy teaching session  Tiger text with questions or concerns         Minal Casarez RN, BSN

## 2023-05-01 NOTE — PLAN OF CARE
Problem: Prexisting or High Potential for Compromised Skin Integrity  Goal: Skin integrity is maintained or improved  Description: INTERVENTIONS:  - Identify patients at risk for skin breakdown  - Assess and monitor skin integrity  - Assess and monitor nutrition and hydration status  - Monitor labs   - Assess for incontinence   - Turn and reposition patient  - Assist with mobility/ambulation  - Relieve pressure over bony prominences  - Avoid friction and shearing  - Provide appropriate hygiene as needed including keeping skin clean and dry  - Evaluate need for skin moisturizer/barrier cream  - Collaborate with interdisciplinary team   - Patient/family teaching  - Consider wound care consult   Outcome: Progressing     Problem: MOBILITY - ADULT  Goal: Maintain or return to baseline ADL function  Description: INTERVENTIONS:  -  Assess patient's ability to carry out ADLs; assess patient's baseline for ADL function and identify physical deficits which impact ability to perform ADLs (bathing, care of mouth/teeth, toileting, grooming, dressing, etc )  - Assess/evaluate cause of self-care deficits   - Assess range of motion  - Assess patient's mobility; develop plan if impaired  - Assess patient's need for assistive devices and provide as appropriate  - Encourage maximum independence but intervene and supervise when necessary  - Involve family in performance of ADLs  - Assess for home care needs following discharge   - Consider OT consult to assist with ADL evaluation and planning for discharge  - Provide patient education as appropriate  Outcome: Progressing  Goal: Maintains/Returns to pre admission functional level  Description: INTERVENTIONS:  - Perform BMAT or MOVE assessment daily    - Set and communicate daily mobility goal to care team and patient/family/caregiver     - Collaborate with rehabilitation services on mobility goals if consulted    - Ambulate patient 3 times a day  - Out of bed to chair 3 times a day   - Out of bed for meals 3 times a day  - Out of bed for toileting  - Record patient progress and toleration of activity level   Outcome: Progressing     Problem: PAIN - ADULT  Goal: Verbalizes/displays adequate comfort level or baseline comfort level  Description: Interventions:  - Encourage patient to monitor pain and request assistance  - Assess pain using appropriate pain scale  - Administer analgesics based on type and severity of pain and evaluate response  - Implement non-pharmacological measures as appropriate and evaluate response  - Consider cultural and social influences on pain and pain management  - Notify physician/advanced practitioner if interventions unsuccessful or patient reports new pain  Outcome: Progressing     Problem: INFECTION - ADULT  Goal: Absence or prevention of progression during hospitalization  Description: INTERVENTIONS:  - Assess and monitor for signs and symptoms of infection  - Monitor lab/diagnostic results  - Monitor all insertion sites, i e  indwelling lines, tubes, and drains  - Monitor endotracheal if appropriate and nasal secretions for changes in amount and color  - [place] appropriate cooling/warming therapies per order  - Administer medications as ordered  - Instruct and encourage patient and family to use good hand hygiene technique  - Identify and instruct in appropriate isolation precautions for identified infection/condition  Outcome: Progressing  Goal: Absence of fever/infection during neutropenic period  Description: INTERVENTIONS:  - Monitor WBC    Outcome: Progressing     Problem: SAFETY ADULT  Goal: Maintain or return to baseline ADL function  Description: INTERVENTIONS:  -  Assess patient's ability to carry out ADLs; assess patient's baseline for ADL function and identify physical deficits which impact ability to perform ADLs (bathing, care of mouth/teeth, toileting, grooming, dressing, etc )  - Assess/evaluate cause of self-care deficits   - Assess range of motion  - Assess patient's mobility; develop plan if impaired  - Assess patient's need for assistive devices and provide as appropriate  - Encourage maximum independence but intervene and supervise when necessary  - Involve family in performance of ADLs  - Assess for home care needs following discharge   - Consider OT consult to assist with ADL evaluation and planning for discharge  - Provide patient education as appropriate  Outcome: Progressing  Goal: Maintains/Returns to pre admission functional level  Description: INTERVENTIONS:  - Perform BMAT or MOVE assessment daily    - Set and communicate daily mobility goal to care team and patient/family/caregiver  - Collaborate with rehabilitation services on mobility goals if consulted    - Ambulate patient 3 times a day  - Out of bed to chair 3 times a day   - Out of bed for meals 3 times a day  - Out of bed for toileting  - Record patient progress and toleration of activity level   Outcome: Progressing  Goal: Patient will remain free of falls  Description: INTERVENTIONS:  - Educate patient/family on patient safety including physical limitations  - Instruct patient to call for assistance with activity   - Consult OT/PT to assist with strengthening/mobility   - Keep Call bell within reach  - Keep bed low and locked with side rails adjusted as appropriate  - Keep care items and personal belongings within reach  - Initiate and maintain comfort rounds  - Make Fall Risk Sign visible to staff  - Apply yellow socks and bracelet for high fall risk patients  - Consider moving patient to room near nurses station  Outcome: Progressing     Problem: Nutrition/Hydration-ADULT  Goal: Nutrient/Hydration intake appropriate for improving, restoring or maintaining nutritional needs  Description: Monitor and assess patient's nutrition/hydration status for malnutrition  Collaborate with interdisciplinary team and initiate plan and interventions as ordered    Monitor patient's weight and dietary intake as ordered or per policy  Utilize nutrition screening tool and intervene as necessary  Determine patient's food preferences and provide high-protein, high-caloric foods as appropriate       INTERVENTIONS:  - Monitor oral intake, urinary output, labs, and treatment plans  - Assess nutrition and hydration status and recommend course of action  - Evaluate amount of meals eaten  - Assist patient with eating if necessary   - Allow adequate time for meals  - Recommend/ encourage appropriate diets, oral nutritional supplements, and vitamin/mineral supplements  - Order, calculate, and assess calorie counts as needed  - Recommend, monitor, and adjust tube feedings and TPN/PPN based on assessed needs  - Assess need for intravenous fluids  - Provide specific nutrition/hydration education as appropriate  - Include patient/family/caregiver in decisions related to nutrition  Outcome: Progressing

## 2023-05-01 NOTE — QUICK NOTE
Patient vomited 550 mls dark green emesis while we were rounding on patient  Will increase IV fluids to 100 mls/hr, continue the clears sparingly, awaiting CBC results, need wound care to see patient again today for ostomy change - was leaking on midline wound  pient to continue ambulating halls multiple times throughout the day

## 2023-05-01 NOTE — UTILIZATION REVIEW
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 colostomy  Patient states after eating pizza yesterday for lunch she began feeling nauseous and had multiple episodes of emesis  She states her nausea is now resolved after NG tube placed  No fevers or chills  Pain is well controlled  Supplemental O2 provided dt O2 sat down to 86% on RA  Continue NPO and IVF, maintain NGT and monitor output, pain and nausea control prn, IO, OOB and ambulation, ostomy teaching, PT OT, home health  CM following for dispo planning      Pertinent Labs/Diagnostic Results:       Results from last 7 days   Lab Units 05/01/23 0433 04/27/23 0456 04/26/23  1742   WBC Thousand/uL 9 43 9 96 9 77   HEMOGLOBIN g/dL 12 0 11 9 13 6   HEMATOCRIT % 37 9 38 4 42 6   PLATELETS Thousands/uL 325 230 264   NEUTROS ABS Thousands/µL 6 88 7 75*  --          Results from last 7 days   Lab Units 05/01/23 0433 04/30/23  0520 04/29/23 0318 04/28/23  0555 04/27/23  0456   SODIUM mmol/L 135 136 138 137 137   POTASSIUM mmol/L 3 8 3 3* 3 7 3 9 3 9   CHLORIDE mmol/L 107 108 106 109* 110*   CO2 mmol/L 27 23 30 25 22   ANION GAP mmol/L 1* 5 2* 3* 5   BUN mg/dL 4* 6 8 5 6   CREATININE mg/dL 0 46* 0 35* 0 50* 0 53* 0 58*   EGFR ml/min/1 73sq m 113 124 110 108 105   CALCIUM mg/dL 8 5 7 9* 8 6 8 2* 8 7   MAGNESIUM mg/dL  --  2 5 1 8  --  2 0   PHOSPHORUS mg/dL  --   --  3 0  --  3 4         Results from last 7 days   Lab Units 04/26/23  1720 04/26/23  0932   POC GLUCOSE mg/dl 125 127     Results from last 7 days   Lab Units 05/01/23  0433 04/30/23  0520 04/29/23 0318 04/28/23  0555 04/27/23  0456 04/26/23  1742   GLUCOSE RANDOM mg/dL 135 89 98 130 92 143*       Vital Signs:   Date/Time Temp Pulse Resp BP MAP (mmHg) SpO2 Calculated FIO2 (%) - Nasal Cannula Nasal Cannula O2 Flow Rate (L/min) O2 Device Patient Position - Orthostatic VS   05/01/23 07:42:52 97 6 °F (36 4 °C) 81 -- 149/91 110 89 % Abnormal  -- -- -- --   04/30/23 2318 -- -- -- -- -- 94 % 28 2 L/min Nasal cannula --   04/30/23 2300 -- -- -- -- -- 87 % Abnormal  28 2 L/min  Nasal cannula --   Nasal Cannula O2 Flow Rate (L/min): applied at 04/30/23 2300   04/30/23 22:48:09 98 °F (36 7 °C) 90 22 161/98 119 88 % Abnormal  -- -- -- --   04/30/23 2004 -- -- -- -- -- 93 % -- -- None (Room air) --   04/30/23 16:48:15 98 2 °F (36 8 °C) 85 -- -- -- 92 % -- -- -- --   04/30/23 15:24:42 98 4 °F (36 9 °C) 82 20 144/87 106 90 % -- -- -- --   04/30/23 0800 -- -- -- -- -- -- 28 2 L/min Nasal cannula --   04/30/23 07:34:21 98 3 °F (36 8 °C) 94 16 144/87 106 89 % Abnormal  -- -- -- --   04/29/23 22:26:06 99 2 °F (37 3 °C) 95 18 143/87 106 93 % -- -- -- --   04/29/23 18:51:13 99 2 °F (37 3 °C) 102 -- -- -- 92 % -- -- -- --   04/29/23 1545 -- -- -- -- -- 93 % 28 2 L/min Nasal cannula --   04/29/23 15:15:23 100 9 °F (38 3 °C) Abnormal  105 20 142/89 107 86 % Abnormal   -- -- -- --   SpO2: On RA, O2 reapplied at 04/29/23 1515   04/29/23 07:44:50 98 °F (36 7 °C) 96 19 137/91 106 86 % Abnormal  -- -- -- Lying   04/29/23 0000 -- -- -- -- -- 93 % 32 3 L/min  Nasal cannula --   Nasal Cannula O2 Flow Rate (L/min): in L nare only d/t NGT in R nare   at 04/29/23 0000       Medications:   Scheduled Medications:  acetaminophen, 975 mg, Oral, Q8H [address]  amLODIPine, 10 mg, Oral, Daily  cefazolin, 1,000 mg, Intravenous, Q8H  gabapentin, 100 mg, Oral, TID  heparin (porcine), 7,500 Units, Subcutaneous, Q8H DEMIAN  methocarbamol, 500 mg, Oral, Q6H [address]      Continuous IV Infusions:  dextrose 5 % and sodium chloride 0 45 % with KCl 20 mEq/L, 100 mL/hr, Intravenous, Continuous      PRN Meds:  HYDROmorphone, 0 5 mg, Intravenous, Q3H PRN  naloxone, 0 04 mg, Intravenous, Q1MIN PRN  ondansetron, 4 mg, Intravenous, Q6H PRN  oxyCODONE, 10 mg, Oral, Q4H PRN  oxyCODONE, 5 mg, Oral, Q4H PRN  phenol, 1 spray, Mouth/Throat, Q2H PRN  saliva substitute, 5 spray, Mouth/Throat, 4x Daily PRN          Discharge Plan: d    Network Utilization Review Department  ATTENTION: Please call with any questions or concerns to [phone] and carefully listen to the prompts so that you are directed to the right person  All voicemails are confidential   Vivienne Hurd all requests for admission clinical reviews, approved or denied determinations and any other requests to dedicated fax number below belonging to the campus where the patient is receiving treatment   List of dedicated fax numbers for the Facilities:  [address] DENIALS (Administrative/Medical Necessity) [phone]   [address] (Maternity/NICU/Pediatrics) [phone]   [address] [place] [address]  [phone]   [address]eri [address] Martíne Jazmynnhashley [facility] [address] [place] [address] [phone]

## 2023-05-02 LAB
ANION GAP SERPL CALCULATED.3IONS-SCNC: 2 MMOL/L (ref 4–13)
BUN SERPL-MCNC: 5 MG/DL (ref 5–25)
CALCIUM SERPL-MCNC: 8.2 MG/DL (ref 8.3–10.1)
CHLORIDE SERPL-SCNC: 109 MMOL/L (ref 96–108)
CO2 SERPL-SCNC: 26 MMOL/L (ref 21–32)
CREAT SERPL-MCNC: 0.49 MG/DL (ref 0.6–1.3)
GFR SERPL CREATININE-BSD FRML MDRD: 111 ML/MIN/1.73SQ M
GLUCOSE SERPL-MCNC: 108 MG/DL (ref 65–140)
MAGNESIUM SERPL-MCNC: 1.9 MG/DL (ref 1.6–2.6)
POTASSIUM SERPL-SCNC: 4 MMOL/L (ref 3.5–5.3)
SODIUM SERPL-SCNC: 137 MMOL/L (ref 135–147)

## 2023-05-02 RX ADMIN — HEPARIN SODIUM 7500 UNITS: 5000 INJECTION INTRAVENOUS; SUBCUTANEOUS at 21:13

## 2023-05-02 RX ADMIN — GABAPENTIN 100 MG: 100 CAPSULE ORAL at 21:13

## 2023-05-02 RX ADMIN — CEFAZOLIN SODIUM 1000 MG: 1 SOLUTION INTRAVENOUS at 00:45

## 2023-05-02 RX ADMIN — AMLODIPINE BESYLATE 10 MG: 10 TABLET ORAL at 08:21

## 2023-05-02 RX ADMIN — GABAPENTIN 100 MG: 100 CAPSULE ORAL at 17:37

## 2023-05-02 RX ADMIN — METHOCARBAMOL 500 MG: 500 TABLET ORAL at 17:37

## 2023-05-02 RX ADMIN — DEXTROSE, SODIUM CHLORIDE, AND POTASSIUM CHLORIDE 100 ML/HR: 5; .45; .15 INJECTION INTRAVENOUS at 04:54

## 2023-05-02 RX ADMIN — GABAPENTIN 100 MG: 100 CAPSULE ORAL at 08:21

## 2023-05-02 RX ADMIN — CEFAZOLIN SODIUM 1000 MG: 1 SOLUTION INTRAVENOUS at 17:37

## 2023-05-02 RX ADMIN — CEFAZOLIN SODIUM 1000 MG: 1 SOLUTION INTRAVENOUS at 08:21

## 2023-05-02 RX ADMIN — OXYCODONE HYDROCHLORIDE 5 MG: 5 TABLET ORAL at 19:48

## 2023-05-02 RX ADMIN — ACETAMINOPHEN 975 MG: 325 TABLET ORAL at 13:13

## 2023-05-02 RX ADMIN — ACETAMINOPHEN 975 MG: 325 TABLET ORAL at 21:13

## 2023-05-02 RX ADMIN — DEXTROSE, SODIUM CHLORIDE, AND POTASSIUM CHLORIDE 50 ML/HR: 5; .45; .15 INJECTION INTRAVENOUS at 21:13

## 2023-05-02 RX ADMIN — METHOCARBAMOL 500 MG: 500 TABLET ORAL at 13:13

## 2023-05-02 RX ADMIN — OXYCODONE HYDROCHLORIDE 10 MG: 10 TABLET ORAL at 07:11

## 2023-05-02 NOTE — PROGRESS NOTES
"Progress Note - Colorectal Surgery   Tarah Olguin 48 y o  female MRN: [id number]  Unit/Bed#: [facility] 820-01 Encounter: [id number]    Assessment:  Patient is a 48 y o  female w/ sigmoid stricture s/p lap hand assisted sigmoidectomy with end colostomy, SBRx2, HELENA 4/26    Ostomy:150 cc  GERMÁN 200ccSS    Plan:   NPO, sips with meds, clears today   IVF  Reubin Jitendra Stoma teaching Wednesday   PT/OT- home health   Please [employer] on call [facility] Colorectal Role if any further questions    Subjective/Objective     Subjective:   No acute events overnight  vomited yesterday  Says she feels better today  No further vomiting overnight  Has been OOB/ walking halls  Pertinent review of systems as above  All other review of systems negative  Objective:    Blood pressure 136/79, pulse 82, temperature 98 1 °F (36 7 °C), resp  rate 16, height 4' 9\" (1 448 m), weight 84 8 kg (187 lb), SpO2 94 %  ,Body mass index is 40 47 kg/m²  Intake/Output Summary (Last 24 hours) at 5/2/2023 0528  Last data filed at 5/2/2023 0454  Gross per 24 hour   Intake 2038  33 ml   Output 3860 ml   Net -1821 67 ml       Invasive Devices     Peripheral Intravenous Line  Duration           Peripheral IV 04/29/23 Left Antecubital 3 days          Drain  Duration           Closed/Suction Drain Right RLQ Bulb 5 days    Colostomy Transverse LUQ 5 days                Physical Exam:   Gen:  NAD  HEENT: NCAT  MMM  CV: well perfused, pulses palpable  Lungs: Normal respiratory effort  Abd: soft, nt/nd, incisions cdi, ostomy intact  Skin: warm/ dry  Extremities: no peripheral edema, no cyanosis  Neuro:  AxO x3      Results from last 7 days   Lab Units 05/01/23  0433 04/27/23  0456 04/26/23  1742   WBC Thousand/uL 9 43 9 96 9 77   HEMOGLOBIN g/dL 12 0 11 9 13 6   HEMATOCRIT % 37 9 38 4 42 6   PLATELETS Thousands/uL 325 230 264     Results from last 7 days   Lab Units 05/01/23  0433 04/30/23  0520 04/29/23  0318   POTASSIUM mmol/L 3 8 3 3* 3 7 " CHLORIDE mmol/L 107 108 106   CO2 mmol/L 27 23 30   BUN mg/dL 4* 6 8   CREATININE mg/dL 0 46* 0 35* 0 50*   CALCIUM mg/dL 8 5 7 9* 8 6            I have personally reviewed pertinent films in PACS      Medications:   Scheduled Meds:  Current Facility-Administered Medications   Medication Dose Route Frequency Provider Last Rate    acetaminophen  975 mg Oral [facility] Kenton López MD      amLODIPine  10 mg Oral Daily Kenton López MD      cefazolin  1,000 mg Intravenous Q8H Zaida Chiu PA-C 1,000 mg (05/02/23 0045)    dextrose 5 % and sodium chloride 0 45 % with KCl 20 mEq/L  100 mL/hr Intravenous Continuous Zaida Chiu PA-C 100 mL/hr (05/02/23 0454)    gabapentin  100 mg Oral TID Murray Baird PA-C      heparin (porcine)  7,500 Units Subcutaneous [facility] Kenton López MD      HYDROmorphone  0 5 mg Intravenous Q3H PRN Radha Mccabe MD      methocarbamol  500 mg Oral Q6H [facility] Kenton López MD      naloxone  0 04 mg Intravenous Q1MIN PRN Kenton López MD      ondansetron  4 mg Intravenous Q6H PRN Kenton López MD      oxyCODONE  10 mg Oral Q4H PRN Radha Mccabe MD      oxyCODONE  5 mg Oral Q4H PRN Radha Mccabe MD      phenol  1 spray Mouth/Throat Q2H PRN Jacinto Segundo MD      saliva substitute  5 spray Mouth/Throat 4x Daily PRN Jacinto Segundo MD       Continuous Infusions:dextrose 5 % and sodium chloride 0 45 % with KCl 20 mEq/L, 100 mL/hr, Last Rate: 100 mL/hr (05/02/23 0454)      PRN Meds:  HYDROmorphone, 0 5 mg, Q3H PRN  naloxone, 0 04 mg, Q1MIN PRN  ondansetron, 4 mg, Q6H PRN  oxyCODONE, 10 mg, Q4H PRN  oxyCODONE, 5 mg, Q4H PRN  phenol, 1 spray, Q2H PRN  saliva substitute, 5 spray, 4x Daily PRN      VTE Pharmacologic Prophylaxis: Heparin  VTE Mechanical Prophylaxis: sequential compression device

## 2023-05-02 NOTE — PLAN OF CARE
Problem: Prexisting or High Potential for Compromised Skin Integrity  Goal: Skin integrity is maintained or improved  Description: INTERVENTIONS:  - Identify patients at risk for skin breakdown  - Assess and monitor skin integrity  - Assess and monitor nutrition and hydration status  - Monitor labs   - Assess for incontinence   - Turn and reposition patient  - Assist with mobility/ambulation  - Relieve pressure over bony prominences  - Avoid friction and shearing  - Provide appropriate hygiene as needed including keeping skin clean and dry  - Evaluate need for skin moisturizer/barrier cream  - Collaborate with interdisciplinary team   - Patient/family teaching  - Consider wound care consult   Outcome: Progressing     Problem: MOBILITY - ADULT  Goal: Maintain or return to baseline ADL function  Description: INTERVENTIONS:  -  Assess patient's ability to carry out ADLs; assess patient's baseline for ADL function and identify physical deficits which impact ability to perform ADLs (bathing, care of mouth/teeth, toileting, grooming, dressing, etc )  - Assess/evaluate cause of self-care deficits   - Assess range of motion  - Assess patient's mobility; develop plan if impaired  - Assess patient's need for assistive devices and provide as appropriate  - Encourage maximum independence but intervene and supervise when necessary  - Involve family in performance of ADLs  - Assess for home care needs following discharge   - Consider OT consult to assist with ADL evaluation and planning for discharge  - Provide patient education as appropriate  Outcome: Progressing  Goal: Maintains/Returns to pre admission functional level  Description: INTERVENTIONS:  - Perform BMAT or MOVE assessment daily    - Set and communicate daily mobility goal to care team and patient/family/caregiver     - Collaborate with rehabilitation services on mobility goals if consulted    - Ambulate patient 3 times a day  - Out of bed to chair 3 times a day   - Out of bed for meals 3 times a day  - Out of bed for toileting  - Record patient progress and toleration of activity level   Outcome: Progressing     Problem: PAIN - ADULT  Goal: Verbalizes/displays adequate comfort level or baseline comfort level  Description: Interventions:  - Encourage patient to monitor pain and request assistance  - Assess pain using appropriate pain scale  - Administer analgesics based on type and severity of pain and evaluate response  - Implement non-pharmacological measures as appropriate and evaluate response  - Consider cultural and social influences on pain and pain management  - Notify physician/advanced practitioner if interventions unsuccessful or patient reports new pain  Outcome: Progressing     Problem: INFECTION - ADULT  Goal: Absence or prevention of progression during hospitalization  Description: INTERVENTIONS:  - Assess and monitor for signs and symptoms of infection  - Monitor lab/diagnostic results  - Monitor all insertion sites, i e  indwelling lines, tubes, and drains  - Monitor endotracheal if appropriate and nasal secretions for changes in amount and color  - [place] appropriate cooling/warming therapies per order  - Administer medications as ordered  - Instruct and encourage patient and family to use good hand hygiene technique  - Identify and instruct in appropriate isolation precautions for identified infection/condition  Outcome: Progressing  Goal: Absence of fever/infection during neutropenic period  Description: INTERVENTIONS:  - Monitor WBC    Outcome: Progressing     Problem: SAFETY ADULT  Goal: Maintain or return to baseline ADL function  Description: INTERVENTIONS:  -  Assess patient's ability to carry out ADLs; assess patient's baseline for ADL function and identify physical deficits which impact ability to perform ADLs (bathing, care of mouth/teeth, toileting, grooming, dressing, etc )  - Assess/evaluate cause of self-care deficits   - Assess range of motion  - Assess patient's mobility; develop plan if impaired  - Assess patient's need for assistive devices and provide as appropriate  - Encourage maximum independence but intervene and supervise when necessary  - Involve family in performance of ADLs  - Assess for home care needs following discharge   - Consider OT consult to assist with ADL evaluation and planning for discharge  - Provide patient education as appropriate  Outcome: Progressing  Goal: Maintains/Returns to pre admission functional level  Description: INTERVENTIONS:  - Perform BMAT or MOVE assessment daily    - Set and communicate daily mobility goal to care team and patient/family/caregiver  - Collaborate with rehabilitation services on mobility goals if consulted    - Ambulate patient 3 times a day  - Out of bed to chair 3 times a day   - Out of bed for meals 3 times a day  - Out of bed for toileting  - Record patient progress and toleration of activity level   Outcome: Progressing  Goal: Patient will remain free of falls  Description: INTERVENTIONS:  - Educate patient/family on patient safety including physical limitations  - Instruct patient to call for assistance with activity   - Consult OT/PT to assist with strengthening/mobility   - Keep Call bell within reach  - Keep bed low and locked with side rails adjusted as appropriate  - Keep care items and personal belongings within reach  - Initiate and maintain comfort rounds  - Make Fall Risk Sign visible to staff  - Apply yellow socks and bracelet for high fall risk patients  - Consider moving patient to room near nurses station  Outcome: Progressing     Problem: Nutrition/Hydration-ADULT  Goal: Nutrient/Hydration intake appropriate for improving, restoring or maintaining nutritional needs  Description: Monitor and assess patient's nutrition/hydration status for malnutrition  Collaborate with interdisciplinary team and initiate plan and interventions as ordered    Monitor patient's weight and dietary intake as ordered or per policy  Utilize nutrition screening tool and intervene as necessary  Determine patient's food preferences and provide high-protein, high-caloric foods as appropriate       INTERVENTIONS:  - Monitor oral intake, urinary output, labs, and treatment plans  - Assess nutrition and hydration status and recommend course of action  - Evaluate amount of meals eaten  - Assist patient with eating if necessary   - Allow adequate time for meals  - Recommend/ encourage appropriate diets, oral nutritional supplements, and vitamin/mineral supplements  - Order, calculate, and assess calorie counts as needed  - Recommend, monitor, and adjust tube feedings and TPN/PPN based on assessed needs  - Assess need for intravenous fluids  - Provide specific nutrition/hydration education as appropriate  - Include patient/family/caregiver in decisions related to nutrition  Outcome: Progressing

## 2023-05-02 NOTE — PLAN OF CARE
Problem: Prexisting or High Potential for Compromised Skin Integrity  Goal: Skin integrity is maintained or improved  Description: INTERVENTIONS:  - Identify patients at risk for skin breakdown  - Assess and monitor skin integrity  - Assess and monitor nutrition and hydration status  - Monitor labs   - Assess for incontinence   - Turn and reposition patient  - Assist with mobility/ambulation  - Relieve pressure over bony prominences  - Avoid friction and shearing  - Provide appropriate hygiene as needed including keeping skin clean and dry  - Evaluate need for skin moisturizer/barrier cream  - Collaborate with interdisciplinary team   - Patient/family teaching  - Consider wound care consult   Outcome: Progressing     Problem: MOBILITY - ADULT  Goal: Maintain or return to baseline ADL function  Description: INTERVENTIONS:  -  Assess patient's ability to carry out ADLs; assess patient's baseline for ADL function and identify physical deficits which impact ability to perform ADLs (bathing, care of mouth/teeth, toileting, grooming, dressing, etc )  - Assess/evaluate cause of self-care deficits   - Assess range of motion  - Assess patient's mobility; develop plan if impaired  - Assess patient's need for assistive devices and provide as appropriate  - Encourage maximum independence but intervene and supervise when necessary  - Involve family in performance of ADLs  - Assess for home care needs following discharge   - Consider OT consult to assist with ADL evaluation and planning for discharge  - Provide patient education as appropriate  Outcome: Progressing  Goal: Maintains/Returns to pre admission functional level  Description: INTERVENTIONS:  - Perform BMAT or MOVE assessment daily    - Set and communicate daily mobility goal to care team and patient/family/caregiver     - Collaborate with rehabilitation services on mobility goals if consulted    - Ambulate patient 3 times a day  - Out of bed to chair 3 times a day   - Out of bed for meals 3 times a day  - Out of bed for toileting  - Record patient progress and toleration of activity level   Outcome: Progressing     Problem: PAIN - ADULT  Goal: Verbalizes/displays adequate comfort level or baseline comfort level  Description: Interventions:  - Encourage patient to monitor pain and request assistance  - Assess pain using appropriate pain scale  - Administer analgesics based on type and severity of pain and evaluate response  - Implement non-pharmacological measures as appropriate and evaluate response  - Consider cultural and social influences on pain and pain management  - Notify physician/advanced practitioner if interventions unsuccessful or patient reports new pain  Outcome: Progressing     Problem: INFECTION - ADULT  Goal: Absence or prevention of progression during hospitalization  Description: INTERVENTIONS:  - Assess and monitor for signs and symptoms of infection  - Monitor lab/diagnostic results  - Monitor all insertion sites, i e  indwelling lines, tubes, and drains  - Monitor endotracheal if appropriate and nasal secretions for changes in amount and color  - [place] appropriate cooling/warming therapies per order  - Administer medications as ordered  - Instruct and encourage patient and family to use good hand hygiene technique  - Identify and instruct in appropriate isolation precautions for identified infection/condition  Outcome: Progressing  Goal: Absence of fever/infection during neutropenic period  Description: INTERVENTIONS:  - Monitor WBC    Outcome: Progressing     Problem: SAFETY ADULT  Goal: Maintain or return to baseline ADL function  Description: INTERVENTIONS:  -  Assess patient's ability to carry out ADLs; assess patient's baseline for ADL function and identify physical deficits which impact ability to perform ADLs (bathing, care of mouth/teeth, toileting, grooming, dressing, etc )  - Assess/evaluate cause of self-care deficits   - Assess range of motion  - Assess patient's mobility; develop plan if impaired  - Assess patient's need for assistive devices and provide as appropriate  - Encourage maximum independence but intervene and supervise when necessary  - Involve family in performance of ADLs  - Assess for home care needs following discharge   - Consider OT consult to assist with ADL evaluation and planning for discharge  - Provide patient education as appropriate  Outcome: Progressing  Goal: Maintains/Returns to pre admission functional level  Description: INTERVENTIONS:  - Perform BMAT or MOVE assessment daily    - Set and communicate daily mobility goal to care team and patient/family/caregiver  - Collaborate with rehabilitation services on mobility goals if consulted    - Ambulate patient 3 times a day  - Out of bed to chair 3 times a day   - Out of bed for meals 3 times a day  - Out of bed for toileting  - Record patient progress and toleration of activity level   Outcome: Progressing  Goal: Patient will remain free of falls  Description: INTERVENTIONS:  - Educate patient/family on patient safety including physical limitations  - Instruct patient to call for assistance with activity   - Consult OT/PT to assist with strengthening/mobility   - Keep Call bell within reach  - Keep bed low and locked with side rails adjusted as appropriate  - Keep care items and personal belongings within reach  - Initiate and maintain comfort rounds  - Make Fall Risk Sign visible to staff  - Apply yellow socks and bracelet for high fall risk patients  - Consider moving patient to room near nurses station  Outcome: Progressing     Problem: Nutrition/Hydration-ADULT  Goal: Nutrient/Hydration intake appropriate for improving, restoring or maintaining nutritional needs  Description: Monitor and assess patient's nutrition/hydration status for malnutrition  Collaborate with interdisciplinary team and initiate plan and interventions as ordered    Monitor patient's weight and dietary intake as ordered or per policy  Utilize nutrition screening tool and intervene as necessary  Determine patient's food preferences and provide high-protein, high-caloric foods as appropriate       INTERVENTIONS:  - Monitor oral intake, urinary output, labs, and treatment plans  - Assess nutrition and hydration status and recommend course of action  - Evaluate amount of meals eaten  - Assist patient with eating if necessary   - Allow adequate time for meals  - Recommend/ encourage appropriate diets, oral nutritional supplements, and vitamin/mineral supplements  - Order, calculate, and assess calorie counts as needed  - Recommend, monitor, and adjust tube feedings and TPN/PPN based on assessed needs  - Assess need for intravenous fluids  - Provide specific nutrition/hydration education as appropriate  - Include patient/family/caregiver in decisions related to nutrition  Outcome: Progressing

## 2023-05-02 NOTE — RESTORATIVE TECHNICIAN NOTE
Restorative Technician Note      Patient Name: Hansel Padilla     Restorative Tech Visit Date: 05/02/23  Note Type: Mobility  Patient Position Upon Consult: Supine  Activity Performed: Ambulated  Assistive Device: Standard walker; Other (Comment) (progressed to no AD and IV pole for stability)  Education Provided: Yes  Patient Position at End of Consult: Bedside chair;  All needs within reach    Rolf MICHELLET, Restorative Technician

## 2023-05-02 NOTE — PLAN OF CARE
Problem: Prexisting or High Potential for Compromised Skin Integrity  Goal: Skin integrity is maintained or improved  Description: INTERVENTIONS:  - Identify patients at risk for skin breakdown  - Assess and monitor skin integrity  - Assess and monitor nutrition and hydration status  - Monitor labs   - Assess for incontinence   - Turn and reposition patient  - Assist with mobility/ambulation  - Relieve pressure over bony prominences  - Avoid friction and shearing  - Provide appropriate hygiene as needed including keeping skin clean and dry  - Evaluate need for skin moisturizer/barrier cream  - Collaborate with interdisciplinary team   - Patient/family teaching  - Consider wound care consult   Outcome: Progressing     Problem: MOBILITY - ADULT  Goal: Maintain or return to baseline ADL function  Description: INTERVENTIONS:  -  Assess patient's ability to carry out ADLs; assess patient's baseline for ADL function and identify physical deficits which impact ability to perform ADLs (bathing, care of mouth/teeth, toileting, grooming, dressing, etc )  - Assess/evaluate cause of self-care deficits   - Assess range of motion  - Assess patient's mobility; develop plan if impaired  - Assess patient's need for assistive devices and provide as appropriate  - Encourage maximum independence but intervene and supervise when necessary  - Involve family in performance of ADLs  - Assess for home care needs following discharge   - Consider OT consult to assist with ADL evaluation and planning for discharge  - Provide patient education as appropriate  Outcome: Progressing  Goal: Maintains/Returns to pre admission functional level  Description: INTERVENTIONS:  - Perform BMAT or MOVE assessment daily    - Set and communicate daily mobility goal to care team and patient/family/caregiver     - Collaborate with rehabilitation services on mobility goals if consulted    - Ambulate patient 3 times a day  - Out of bed to chair 3 times a day   - Out of bed for meals 3 times a day  - Out of bed for toileting  - Record patient progress and toleration of activity level   Outcome: Progressing     Problem: PAIN - ADULT  Goal: Verbalizes/displays adequate comfort level or baseline comfort level  Description: Interventions:  - Encourage patient to monitor pain and request assistance  - Assess pain using appropriate pain scale  - Administer analgesics based on type and severity of pain and evaluate response  - Implement non-pharmacological measures as appropriate and evaluate response  - Consider cultural and social influences on pain and pain management  - Notify physician/advanced practitioner if interventions unsuccessful or patient reports new pain  Outcome: Progressing     Problem: INFECTION - ADULT  Goal: Absence or prevention of progression during hospitalization  Description: INTERVENTIONS:  - Assess and monitor for signs and symptoms of infection  - Monitor lab/diagnostic results  - Monitor all insertion sites, i e  indwelling lines, tubes, and drains  - Monitor endotracheal if appropriate and nasal secretions for changes in amount and color  - [place] appropriate cooling/warming therapies per order  - Administer medications as ordered  - Instruct and encourage patient and family to use good hand hygiene technique  - Identify and instruct in appropriate isolation precautions for identified infection/condition  Outcome: Progressing  Goal: Absence of fever/infection during neutropenic period  Description: INTERVENTIONS:  - Monitor WBC    Outcome: Progressing

## 2023-05-03 LAB
ANION GAP SERPL CALCULATED.3IONS-SCNC: 3 MMOL/L (ref 4–13)
BUN SERPL-MCNC: 4 MG/DL (ref 5–25)
CALCIUM SERPL-MCNC: 8.3 MG/DL (ref 8.3–10.1)
CHLORIDE SERPL-SCNC: 108 MMOL/L (ref 96–108)
CO2 SERPL-SCNC: 24 MMOL/L (ref 21–32)
CREAT SERPL-MCNC: 0.5 MG/DL (ref 0.6–1.3)
GFR SERPL CREATININE-BSD FRML MDRD: 110 ML/MIN/1.73SQ M
GLUCOSE SERPL-MCNC: 104 MG/DL (ref 65–140)
MAGNESIUM SERPL-MCNC: 1.9 MG/DL (ref 1.6–2.6)
POTASSIUM SERPL-SCNC: 3.9 MMOL/L (ref 3.5–5.3)
SODIUM SERPL-SCNC: 135 MMOL/L (ref 135–147)

## 2023-05-03 RX ADMIN — ACETAMINOPHEN 975 MG: 325 TABLET ORAL at 13:16

## 2023-05-03 RX ADMIN — CEFAZOLIN SODIUM 1000 MG: 1 SOLUTION INTRAVENOUS at 08:10

## 2023-05-03 RX ADMIN — AMLODIPINE BESYLATE 10 MG: 10 TABLET ORAL at 08:10

## 2023-05-03 RX ADMIN — METHOCARBAMOL 500 MG: 500 TABLET ORAL at 11:59

## 2023-05-03 RX ADMIN — ACETAMINOPHEN 975 MG: 325 TABLET ORAL at 06:09

## 2023-05-03 RX ADMIN — CEFAZOLIN SODIUM 1000 MG: 1 SOLUTION INTRAVENOUS at 00:06

## 2023-05-03 RX ADMIN — HEPARIN SODIUM 7500 UNITS: 5000 INJECTION INTRAVENOUS; SUBCUTANEOUS at 06:10

## 2023-05-03 RX ADMIN — CEFAZOLIN SODIUM 1000 MG: 1 SOLUTION INTRAVENOUS at 17:08

## 2023-05-03 RX ADMIN — HEPARIN SODIUM 7500 UNITS: 5000 INJECTION INTRAVENOUS; SUBCUTANEOUS at 13:16

## 2023-05-03 RX ADMIN — ONDANSETRON 4 MG: 2 INJECTION INTRAMUSCULAR; INTRAVENOUS at 19:30

## 2023-05-03 RX ADMIN — METHOCARBAMOL 500 MG: 500 TABLET ORAL at 06:09

## 2023-05-03 RX ADMIN — GABAPENTIN 100 MG: 100 CAPSULE ORAL at 17:07

## 2023-05-03 RX ADMIN — METHOCARBAMOL 500 MG: 500 TABLET ORAL at 00:07

## 2023-05-03 RX ADMIN — GABAPENTIN 100 MG: 100 CAPSULE ORAL at 08:10

## 2023-05-03 RX ADMIN — METHOCARBAMOL 500 MG: 500 TABLET ORAL at 17:07

## 2023-05-03 NOTE — PLAN OF CARE
Problem: PAIN - ADULT  Goal: Verbalizes/displays adequate comfort level or baseline comfort level  Description: Interventions:  - Encourage patient to monitor pain and request assistance  - Assess pain using appropriate pain scale  - Administer analgesics based on type and severity of pain and evaluate response  - Implement non-pharmacological measures as appropriate and evaluate response  - Consider cultural and social influences on pain and pain management  - Notify physician/advanced practitioner if interventions unsuccessful or patient reports new pain  Outcome: Progressing     Problem: INFECTION - ADULT  Goal: Absence or prevention of progression during hospitalization  Description: INTERVENTIONS:  - Assess and monitor for signs and symptoms of infection  - Monitor lab/diagnostic results  - Monitor all insertion sites, i e  indwelling lines, tubes, and drains  - Monitor endotracheal if appropriate and nasal secretions for changes in amount and color  - [place] appropriate cooling/warming therapies per order  - Administer medications as ordered  - Instruct and encourage patient and family to use good hand hygiene technique  - Identify and instruct in appropriate isolation precautions for identified infection/condition  Outcome: Progressing  Goal: Absence of fever/infection during neutropenic period  Description: INTERVENTIONS:  - Monitor WBC    Outcome: Progressing     Problem: Nutrition/Hydration-ADULT  Goal: Nutrient/Hydration intake appropriate for improving, restoring or maintaining nutritional needs  Description: Monitor and assess patient's nutrition/hydration status for malnutrition  Collaborate with interdisciplinary team and initiate plan and interventions as ordered  Monitor patient's weight and dietary intake as ordered or per policy  Utilize nutrition screening tool and intervene as necessary  Determine patient's food preferences and provide high-protein, high-caloric foods as appropriate  INTERVENTIONS:  - Monitor oral intake, urinary output, labs, and treatment plans  - Assess nutrition and hydration status and recommend course of action  - Evaluate amount of meals eaten  - Assist patient with eating if necessary   - Allow adequate time for meals  - Recommend/ encourage appropriate diets, oral nutritional supplements, and vitamin/mineral supplements  - Order, calculate, and assess calorie counts as needed  - Recommend, monitor, and adjust tube feedings and TPN/PPN based on assessed needs  - Assess need for intravenous fluids  - Provide specific nutrition/hydration education as appropriate  - Include patient/family/caregiver in decisions related to nutrition  Outcome: Progressing

## 2023-05-03 NOTE — PLAN OF CARE
Problem: PHYSICAL THERAPY ADULT  Goal: Performs mobility at highest level of function for planned discharge setting  See evaluation for individualized goals  Description: Treatment/Interventions: Functional transfer training, Therapeutic exercise, Bed mobility, Gait training, Spoke to nursing, OT  Equipment Recommended: Catherine Dash       See flowsheet documentation for full assessment, interventions and recommendations  Outcome: Adequate for Discharge  Note: Prognosis: Fair  Problem List: Decreased endurance, Pain  Assessment: Pt seen for PT treatment session this date  Therapy session focused on bed mobility, functional transfers, and ambulation in order to improve overall mobility and independence  Pt completes bed mobility with supervision, attempts and completes ambulation without AD with supervision, slow but steady gait in hallway  Pt with good mobility , encourage continued ambulation while in hospital with restorative team and staff, no further skilled PT services indicated  Pt was left in recliner at the end of PT session with all needs in reach  Pt would benefit from continued PT services while in hospital to address remaining limitations  The patient's AM-PAC Basic Mobility Inpatient Short Form Raw Score is 20  A Raw score of greater than 16 suggests the patient may benefit from discharge to home  Please also refer to the recommendation of the Physical Therapist for safe discharge planning  PT Discharge Recommendation: No rehabilitation needs    See flowsheet documentation for full assessment

## 2023-05-03 NOTE — WOUND OSTOMY CARE
"Progress Note- Ostomy  Terrie Taylor 48 y o  female  [id number]  [facility] [address] [id number]    Assessment:  Patient seen today for ostomy teaching  Patient is agreeable to be more hands on during ostomy teaching today - she was able to remove the pouch, cleanse the bulmaro-stomal skin, cut the pouch with scissors, and close the end of the pouch with minimal assistance from staff  Patient was attentive throughout pouch change process and asked appropriate questions  Although she states that she has not yet emptied the pouch herself, she was able to verbally describe the process and frequency  Patient was educated on need to continue to become more independent for ostomy care and to try to do as much as she can while in the hospital- patient stated understanding  Additional discharge supplies (pouches and esdras ring) provided to patient- patient instructed to bring pouch of supplies home with her at discharge- patient stated understanding  Topics reviewed with patient during ostomy teaching session: normal & abdormal stoma appearance, how and when to empty the pouch (1/3- 1/2 full) to prevent pulling/lifting of the barrier, importance & how to clean the end of the pouch to prevent odor after pouch emptying, frequency of changing of the pouch (every 3-4 days on a schedule), bulmaro-stomal skin care, how to measure the stoma/ cut the wafer to the correct size, how to close the pouch, and how to obtain supplies  Step-by-step pouch change done using Esteem + 4 inch pouch  Reviewed with patient how and when to measure the stoma (weekly)  Skin prep applied to bulmaro-stomal skin, rationale explained to patient  Good seal obtained  Patient verbalized understanding of education and teaching  Patient is active learner  All questions and concerns answered  Encouraged patient to continue to read the educational materials provided - \"Life after colostomy Surgery\" by [facility]       Patient gave verbal permission to order sample kits " "from [place], [facility], and [employer]     Stoma appearance:         Stoma: Red, oval, well budded stoma with downward pointing os, peristomal skin is intact  Stoma attached to skin junction, no separation noted  Small amount of brown liquid effluent in bag  STOMA MEASUREMENT: 2 inches top to bottom and 2 5 inches side to side  Ostomy Care:  -Stoma Measurement: 2 in top to bottom and 2 5 in side to side (Measure stoma weekly for next 6-8 weeks- stoma will decrease in size due to decrease in swelling)     -Appliance Used During Bag Change: Esteem One Piece Cut to Fit (Storeroom number: [id number])   -Accessories Used:3M No Sting Skin Protectant, 4 inch Dennise Ring    *Can shower with pouch on or off  Make sure to dry off pouch after shower  Bag Change Steps:  1  Peel back pouch using push-pull method, may use non-alcohol adhesive remover  Remove pouch from top to bottom  2  Use warm water only to cleanse skin around the stoma (bulmaro-stomal skin)  3  Make sure all adhesive residue is removed and skin is dry and not oily  4  Measure stoma size using measuring guide and trace correct measurements onto back of pouch  5  Then cut or mold the backing of pouch out to correct shape/size  6  Use 3M no sting barrier film to prep the skin around the stoma  (If the skin is open, moist or fragile, Crusting can be done now to create dry skin and assist with pouch adherence- steps are listed below)  7  Apply Dennise Ring in [address] on lower half of stoma  8  Place pouch over stoma and onto skin  9  Use warmth of hand to apply gentle pressure to help backing of pouch to adhere well to skin  TIPS:  Empty pouch when 1/3 -1/2 full  Change pouch every 4-5 days or if signs of leaking      Crusting as needed for moist, red, open skin around the stoma: (Done prior to pouch application)   Apply stoma powder to excoriation, move excess powder away with hand  Use no sting barrier to pat area to form \"crust\"  Repeat X2 " before placing new ostomy pouch     Orders listed below and wound care will continue to follow, call or tiger text with questions  Bedside nurse updated of findings and orders  Flowsheets below with pictures and measurements        Sussy Esteban RN, BSN

## 2023-05-03 NOTE — PLAN OF CARE
Problem: Prexisting or High Potential for Compromised Skin Integrity  Goal: Skin integrity is maintained or improved  Description: INTERVENTIONS:  - Identify patients at risk for skin breakdown  - Assess and monitor skin integrity  - Assess and monitor nutrition and hydration status  - Monitor labs   - Assess for incontinence   - Turn and reposition patient  - Assist with mobility/ambulation  - Relieve pressure over bony prominences  - Avoid friction and shearing  - Provide appropriate hygiene as needed including keeping skin clean and dry  - Evaluate need for skin moisturizer/barrier cream  - Collaborate with interdisciplinary team   - Patient/family teaching  - Consider wound care consult   Outcome: Progressing     Problem: MOBILITY - ADULT  Goal: Maintain or return to baseline ADL function  Description: INTERVENTIONS:  -  Assess patient's ability to carry out ADLs; assess patient's baseline for ADL function and identify physical deficits which impact ability to perform ADLs (bathing, care of mouth/teeth, toileting, grooming, dressing, etc )  - Assess/evaluate cause of self-care deficits   - Assess range of motion  - Assess patient's mobility; develop plan if impaired  - Assess patient's need for assistive devices and provide as appropriate  - Encourage maximum independence but intervene and supervise when necessary  - Involve family in performance of ADLs  - Assess for home care needs following discharge   - Consider OT consult to assist with ADL evaluation and planning for discharge  - Provide patient education as appropriate  Outcome: Progressing  Goal: Maintains/Returns to pre admission functional level  Description: INTERVENTIONS:  - Perform BMAT or MOVE assessment daily    - Set and communicate daily mobility goal to care team and patient/family/caregiver     - Collaborate with rehabilitation services on mobility goals if consulted    - Ambulate patient 3 times a day  - Out of bed to chair 3 times a day   - Out of bed for meals 3 times a day  - Out of bed for toileting  - Record patient progress and toleration of activity level   Outcome: Progressing     Problem: PAIN - ADULT  Goal: Verbalizes/displays adequate comfort level or baseline comfort level  Description: Interventions:  - Encourage patient to monitor pain and request assistance  - Assess pain using appropriate pain scale  - Administer analgesics based on type and severity of pain and evaluate response  - Implement non-pharmacological measures as appropriate and evaluate response  - Consider cultural and social influences on pain and pain management  - Notify physician/advanced practitioner if interventions unsuccessful or patient reports new pain  Outcome: Progressing     Problem: INFECTION - ADULT  Goal: Absence or prevention of progression during hospitalization  Description: INTERVENTIONS:  - Assess and monitor for signs and symptoms of infection  - Monitor lab/diagnostic results  - Monitor all insertion sites, i e  indwelling lines, tubes, and drains  - Monitor endotracheal if appropriate and nasal secretions for changes in amount and color  - [place] appropriate cooling/warming therapies per order  - Administer medications as ordered  - Instruct and encourage patient and family to use good hand hygiene technique  - Identify and instruct in appropriate isolation precautions for identified infection/condition  Outcome: Progressing  Goal: Absence of fever/infection during neutropenic period  Description: INTERVENTIONS:  - Monitor WBC    Outcome: Progressing     Problem: SAFETY ADULT  Goal: Maintain or return to baseline ADL function  Description: INTERVENTIONS:  -  Assess patient's ability to carry out ADLs; assess patient's baseline for ADL function and identify physical deficits which impact ability to perform ADLs (bathing, care of mouth/teeth, toileting, grooming, dressing, etc )  - Assess/evaluate cause of self-care deficits   - Assess range of motion  - Assess patient's mobility; develop plan if impaired  - Assess patient's need for assistive devices and provide as appropriate  - Encourage maximum independence but intervene and supervise when necessary  - Involve family in performance of ADLs  - Assess for home care needs following discharge   - Consider OT consult to assist with ADL evaluation and planning for discharge  - Provide patient education as appropriate  Outcome: Progressing  Goal: Maintains/Returns to pre admission functional level  Description: INTERVENTIONS:  - Perform BMAT or MOVE assessment daily    - Set and communicate daily mobility goal to care team and patient/family/caregiver  - Collaborate with rehabilitation services on mobility goals if consulted    - Ambulate patient 3 times a day  - Out of bed to chair 3 times a day   - Out of bed for meals 3 times a day  - Out of bed for toileting  - Record patient progress and toleration of activity level   Outcome: Progressing  Goal: Patient will remain free of falls  Description: INTERVENTIONS:  - Educate patient/family on patient safety including physical limitations  - Instruct patient to call for assistance with activity   - Consult OT/PT to assist with strengthening/mobility   - Keep Call bell within reach  - Keep bed low and locked with side rails adjusted as appropriate  - Keep care items and personal belongings within reach  - Initiate and maintain comfort rounds  - Make Fall Risk Sign visible to staff  - Apply yellow socks and bracelet for high fall risk patients  - Consider moving patient to room near nurses station  Outcome: Progressing     Problem: Nutrition/Hydration-ADULT  Goal: Nutrient/Hydration intake appropriate for improving, restoring or maintaining nutritional needs  Description: Monitor and assess patient's nutrition/hydration status for malnutrition  Collaborate with interdisciplinary team and initiate plan and interventions as ordered    Monitor patient's weight and dietary intake as ordered or per policy  Utilize nutrition screening tool and intervene as necessary  Determine patient's food preferences and provide high-protein, high-caloric foods as appropriate       INTERVENTIONS:  - Monitor oral intake, urinary output, labs, and treatment plans  - Assess nutrition and hydration status and recommend course of action  - Evaluate amount of meals eaten  - Assist patient with eating if necessary   - Allow adequate time for meals  - Recommend/ encourage appropriate diets, oral nutritional supplements, and vitamin/mineral supplements  - Order, calculate, and assess calorie counts as needed  - Recommend, monitor, and adjust tube feedings and TPN/PPN based on assessed needs  - Assess need for intravenous fluids  - Provide specific nutrition/hydration education as appropriate  - Include patient/family/caregiver in decisions related to nutrition  Outcome: Progressing

## 2023-05-03 NOTE — PHYSICAL THERAPY NOTE
PHYSICAL THERAPY NOTE          Patient Name: Brittany Pagan  NYXUJ'[id number] Date: 5/3/2023     05/03/23 1255   PT Last Visit   PT Visit Date 05/03/23   Note Type   Note Type Treatment   Education   Education Provided Yes   Pain Assessment   Pain Assessment Tool 0-10   Pain Score 7   Pain Location/Orientation Location: Abdomen   Hospital Pain Intervention(s) Repositioned; Ambulation/increased activity   Restrictions/Precautions   Weight Bearing Precautions Per Order No   Other Precautions Pain  (GERMÁN drain)   General   Chart Reviewed Yes   Cognition   Overall Cognitive Status WFL   Arousal/Participation Alert   Attention Within functional limits   Orientation Level Oriented X4   Memory Within functional limits   Following Commands Follows one step commands without difficulty   Comments Pt cooperative during session   Bed Mobility   Supine to Sit 5  Supervision   Additional items Bedrails; Increased time required   Sit to Supine Unable to assess   Additional Comments Pt noted to be in bed upon arrival    Transfers   Sit to Stand 7  Independent   Additional items Increased time required;Verbal cues   Stand to Sit 7  Independent   Additional items Increased time required;Verbal cues   Additional Comments Initially with RW then no AD   Ambulation/Elevation   Gait pattern Wide CLEMENCIA   Gait Assistance 5  Supervision   Assistive Device   (Initially with RW then no AD)   Distance 90' + 200'   Ambulation/Elevation Additional Comments Pt with improved ambulation distance and activity tolerance  Pt noted to have low O2 sats on RA during session 84-89%, required to be started on 2 L O2 for O2 sats to improve to 91%  Nursing updated     Balance   Static Sitting Good   Dynamic Sitting Fair +   Static Standing Fair   Dynamic Standing Fair -   Ambulatory Fair -   Endurance Deficit   Endurance Deficit Yes   Activity Tolerance   Activity Tolerance Patient limited by pain   Medical Staff Made Aware DPT Divya Killian   Nurse Made Aware RN cleared /updated   Assessment   Prognosis Fair   Problem List Decreased endurance;Pain   Assessment Pt seen for PT treatment session this date  Therapy session focused on bed mobility, functional transfers, and ambulation in order to improve overall mobility and independence  Pt completes bed mobility with supervision, attempts and completes ambulation without AD with supervision, slow but steady gait in hallway  Pt with good mobility , encourage continued ambulation while in hospital with restorative team and staff, no further skilled PT services indicated  Pt was left in recliner at the end of PT session with all needs in reach  Pt would benefit from continued PT services while in hospital to address remaining limitations  The patient's AM-PAC Basic Mobility Inpatient Short Form Raw Score is 20  A Raw score of greater than 16 suggests the patient may benefit from discharge to home  Please also refer to the recommendation of the Physical Therapist for safe discharge planning     Goals   Patient Goals to have less pain   PT Treatment Day 1   Plan   Progress Discontinue PT   Recommendation   PT Discharge Recommendation No rehabilitation needs   AM-PAC Basic Mobility Inpatient   Turning in Flat Bed Without Bedrails 3   Lying on Back to Sitting on Edge of Flat Bed Without Bedrails 3   Moving Bed to Chair 4   Standing Up From Chair Using Arms 4   Walk in Room 3   Climb 3-5 Stairs With Railing 3   Basic Mobility Inpatient Raw Score 20   Basic Mobility Standardized Score 43 99   Highest Level Of Mobility   JH-HLM Goal 6: Walk 10 steps or more   JH-HLM Achieved 7: Walk 25 feet or more   Education   Education Provided Mobility training   Patient Demonstrates acceptance/verbal understanding     Jacob Meadows PT DPT

## 2023-05-03 NOTE — OCCUPATIONAL THERAPY NOTE
Occupational Therapy Progress Note     Patient Name: Chiara HATFIELD Date: 5/3/2023  Problem List  Active Problems:    * No active hospital problems  *            05/03/23 1049   OT Last Visit   OT Visit Date 05/03/23   Note Type   Note Type Treatment   Pain Assessment   Pain Assessment Tool 0-10   Pain Score No Pain   Restrictions/Precautions   Weight Bearing Precautions Per Order No   Other Precautions Multiple lines; Fall Risk  (x1 GERMÁN, colostomy)   Lifestyle   Autonomy PTA, pt was independent in ADLs/IADLs and functional mobility w/ no DME; (+) driving   Reciprocal Relationships Lives with her frankie   Service to Others Reports working full time   [address] Enjoys going on camping trips   ADL   Where Assessed Edge of bed   Eating Assistance 7  Independent   Grooming Assistance 7  Independent   Grooming Deficit Brushing hair   LB Dressing Assistance 5  Supervision/Setup   LB Dressing Deficit Setup;Don/doff L sock; Don/doff R sock  (simulated)   LB Dressing Comments Pt educated on compensatory techniques with LB ADLs and Pt able to simulate seated on EOB  Bed Mobility   Supine to Sit 5  Supervision   Additional items HOB elevated; Increased time required  (educated on log roll- Pt states she plans on sleeping in a recliner )   Additional Comments Pt greeted supine in bed  Transfers   Sit to Stand 6  Modified independent   Stand to Sit 6  Modified independent   Additional Comments with RW   Functional Mobility   Functional Mobility 6  Modified independent   Additional Comments Mod I with functional mobility with RW- household distances   Additional items Rolling walker   Cognition   Overall Cognitive Status WFL   Arousal/Participation Alert; Responsive;Arousable; Cooperative   Attention Within functional limits   Orientation Level Oriented X4   Memory Within functional limits   Following Commands Follows all commands and directions without difficulty   Comments Pt pleasant and cooperative during OT session  Activity Tolerance   Activity Tolerance Patient tolerated treatment well   Medical Staff Made Aware Rn cleared/updated  Assessment   Assessment Pt greeted bedside for OT treatment on 5/3/2023 focusing on maximizing independence with ADLs  Pt S with bed mobility, mod I with functional transfers, and mod I with functional mobility  Pt completes grooming at I level and LB dressing at S level  Education provided to Pt about energy conservation techniques and compensatory techniques with ADLs/IADLs  Pt encouraged to participate in ADLs/functional mobility with nursing/restorative staff during hospitalization  Pt with no further acute OT concerns  Pt would benefit from returning home with increased social support upon DC to maximize safety and independence with ADLs and functional tasks of choice  DC skilled OT services  Plan   Treatment Interventions ADL retraining;Functional transfer training;UE strengthening/ROM; Endurance training;Patient/family training; Compensatory technique education; Energy conservation; Activityengagement  (DC SKILLED OT SERVICES)   Goal Expiration Date (S)  05/11/23  ([place] SKILLED OT SERVICES S/P Today's tx)   OT Treatment Day 1   OT Frequency 2-3x/wk   Recommendation   OT Discharge Recommendation (S)  No rehabilitation needs   Equipment Recommended   (declines)   Additional Comments  The patient's raw score on the AM-PAC Daily Activity Inpatient Short Form is 21  A raw score of greater than or equal to 19 suggests the patient may benefit from discharge to home  Please refer to the recommendation of the Occupational Therapist for safe discharge planning     AM-PAC Daily Activity Inpatient   Lower Body Dressing 3   Bathing 3   Toileting 3   Upper Body Dressing 4   Grooming 4   Eating 4   Daily Activity Raw Score 21   Daily Activity Standardized Score (Calc for Raw Score >=11) 44 27   AM-[facility] Applied Cognition Inpatient   Following a Speech/Presentation 4   Understanding Ordinary Conversation 4   Taking Medications 4   Remembering Where Things Are Placed or Put Away 4   Remembering List of 4-5 Errands 4   Taking Care of Complicated Tasks 4   Applied Cognition Raw Score 24   Applied Cognition Standardized Score 62 21   End of Consult   Education Provided Yes   Patient Position at End of Consult Supine; All needs within reach   Nurse Communication Nurse aware of consult       [place] Lance, MS, OTR/L

## 2023-05-03 NOTE — PROGRESS NOTES
"Progress Note - Colorectal Surgery   Turner Villarreal 48 y o  female MRN: [id number]  Unit/Bed#: [facility] 820-01 Encounter: [id number]    Assessment:  Turner Villarreal is a 48 y o  female w/ sigmoid stricture s/p lap hand assisted sigmoidectomy with end colostomy, SBRx2, HELENA 4/26  Course c/b ileus    ORA  UOP 1 1L  Right GERMÁN 90 cc serous  Ostomy 150 cc both liquid and formed stool    Plan:   CLD, consider advancing   D/c IVF    Monitor output   Pain and nausea control as needed   Strict intake and output measurements   OOB/ambulate   Ostomy teaching   PT/OT- home health   Ancef on for c/f cellulitis   Please Tiger[employer] on call [facility] Colorectal Role if any further questions      Subjective/Objective     Subjective:   Patient seen and examined bedside  Denies nausea or emesis  Tolerating clears and crackers  OOB and ambulating  No fevers or chills  Objective:    Blood pressure 128/78, pulse 78, temperature 98 6 °F (37 °C), resp  rate 18, height 4' 9\" (1 448 m), weight 84 8 kg (187 lb), SpO2 (!) 88 %  ,Body mass index is 40 47 kg/m²        Intake/Output Summary (Last 24 hours) at 5/3/2023 0557  Last data filed at 5/2/2023 2230  Gross per 24 hour   Intake 1252 5 ml   Output 1340 ml   Net -87 5 ml       Invasive Devices     Peripheral Intravenous Line  Duration           Peripheral IV 04/29/23 Left Antecubital 4 days          Drain  Duration           Closed/Suction Drain Right RLQ Bulb 6 days    Colostomy Transverse LUQ 6 days                Physical Exam:   General - no acute distress, responsive and cooperative  CV - warm, regular rate  Pulm - normal work of breathing, no respiratory distress on room air  Abd - soft, nondistended, incisions intact, ostomy viable with formed stool and liquid per bag, lower abdominal incision with drainage and blanching erythema, GERMÁN serous  Neuro - m/s grossly intact, cn grossly intact  Ext - moving all extremities        Results from last 7 days   Lab Units " 05/01/23  0433 04/27/23  0456 04/26/23  1742   WBC Thousand/uL 9 43 9 96 9 77   HEMOGLOBIN g/dL 12 0 11 9 13 6   HEMATOCRIT % 37 9 38 4 42 6   PLATELETS Thousands/uL 325 230 264     Results from last 7 days   Lab Units 05/02/23  0449 05/01/23  0433 04/30/23  0520   POTASSIUM mmol/L 4 0 3 8 3 3*   CHLORIDE mmol/L 109* 107 108   CO2 mmol/L 26 27 23   BUN mg/dL 5 4* 6   CREATININE mg/dL 0 49* 0 46* 0 35*   CALCIUM mg/dL 8 2* 8 5 7 9*            I have personally reviewed pertinent films in PACS      Medications:   Scheduled Meds:  Current Facility-Administered Medications   Medication Dose Route Frequency Provider Last Rate    acetaminophen  975 mg Oral [facility] Leonor Dejesus MD      amLODIPine  10 mg Oral Daily Leonor Dejesus MD      cefazolin  1,000 mg Intravenous Q8H DAVID Echevarria-C 1,000 mg (05/03/23 0006)    dextrose 5 % and sodium chloride 0 45 % with KCl 20 mEq/L  50 mL/hr Intravenous Continuous Dorita Jain PA-C 50 mL/hr (05/02/23 2113)    gabapentin  100 mg Oral TID Dorita Jain PA-C      heparin (porcine)  7,500 Units Subcutaneous [facility] Leonor Dejesus MD      HYDROmorphone  0 5 mg Intravenous Q3H PRN Steven Todd MD      methocarbamol  500 mg Oral Q6H [facility] Leonor Dejesus MD      naloxone  0 04 mg Intravenous Q1MIN PRN Leonor Dejesus MD      ondansetron  4 mg Intravenous Q6H PRN Leonor Dejesus MD      oxyCODONE  10 mg Oral Q4H PRN Steven Todd MD      oxyCODONE  5 mg Oral Q4H PRN Steven Todd MD      phenol  1 spray Mouth/Throat Q2H PRN Kilo Garcia MD      saliva substitute  5 spray Mouth/Throat 4x Daily PRN Kilo Garcia MD       Continuous Infusions:dextrose 5 % and sodium chloride 0 45 % with KCl 20 mEq/L, 50 mL/hr, Last Rate: 50 mL/hr (05/02/23 2113)      PRN Meds:  HYDROmorphone, 0 5 mg, Q3H PRN  naloxone, 0 04 mg, Q1MIN PRN  ondansetron, 4 mg, Q6H PRN  oxyCODONE, 10 mg, Q4H PRN  oxyCODONE, 5 mg, Q4H PRN  phenol, 1 spray, Q2H PRN  saliva substitute, 5 spray, 4x Daily PRN      VTE Pharmacologic Prophylaxis: Heparin  VTE Mechanical Prophylaxis: sequential compression device

## 2023-05-03 NOTE — PLAN OF CARE
Problem: OCCUPATIONAL THERAPY ADULT  Goal: Performs self-care activities at highest level of function for planned discharge setting  See evaluation for individualized goals  Description: Treatment Interventions: ADL retraining, Functional transfer training, Endurance training, Patient/family training, Equipment evaluation/education, Compensatory technique education, Continued evaluation, Energy conservation, Activityengagement  Equipment Recommended: Bedside commode, Shower/Tub chair with back ($)       See flowsheet documentation for full assessment, interventions and recommendations  Outcome: Adequate for Discharge  Note: Limitation: Decreased ADL status, Decreased endurance, Decreased self-care trans, Decreased high-level ADLs  Prognosis: Fair  Assessment: Pt greeted bedside for OT treatment on 5/3/2023 focusing on maximizing independence with ADLs  Pt S with bed mobility, mod I with functional transfers, and mod I with functional mobility  Pt completes grooming at I level and LB dressing at S level  Education provided to Pt about energy conservation techniques and compensatory techniques with ADLs/IADLs  Pt encouraged to participate in ADLs/functional mobility with nursing/restorative staff during hospitalization  Pt with no further acute OT concerns  Pt would benefit from returning home with increased social support upon DC to maximize safety and independence with ADLs and functional tasks of choice  DC skilled OT services       OT Discharge Recommendation: (S) No rehabilitation needs

## 2023-05-04 LAB
BASOPHILS # BLD MANUAL: 0.17 THOUSAND/UL (ref 0–0.1)
BASOPHILS NFR MAR MANUAL: 2 % (ref 0–1)
EOSINOPHIL # BLD MANUAL: 0.43 THOUSAND/UL (ref 0–0.4)
EOSINOPHIL NFR BLD MANUAL: 5 % (ref 0–6)
ERYTHROCYTE [DISTWIDTH] IN BLOOD BY AUTOMATED COUNT: 13 % (ref 11.6–15.1)
HCT VFR BLD AUTO: 38.2 % (ref 34.8–46.1)
HGB BLD-MCNC: 12.5 G/DL (ref 11.5–15.4)
LYMPHOCYTES # BLD AUTO: 0.77 THOUSAND/UL (ref 0.6–4.47)
LYMPHOCYTES # BLD AUTO: 9 % (ref 14–44)
MCH RBC QN AUTO: 28.9 PG (ref 26.8–34.3)
MCHC RBC AUTO-ENTMCNC: 32.7 G/DL (ref 31.4–37.4)
MCV RBC AUTO: 88 FL (ref 82–98)
MONOCYTES # BLD AUTO: 0.6 THOUSAND/UL (ref 0–1.22)
MONOCYTES NFR BLD: 7 % (ref 4–12)
NEUTROPHILS # BLD MANUAL: 6.09 THOUSAND/UL (ref 1.85–7.62)
NEUTS BAND NFR BLD MANUAL: 2 % (ref 0–8)
NEUTS SEG NFR BLD AUTO: 69 % (ref 43–75)
PLATELET # BLD AUTO: 414 THOUSANDS/UL (ref 149–390)
PLATELET BLD QL SMEAR: ABNORMAL
PMV BLD AUTO: 10.1 FL (ref 8.9–12.7)
POIKILOCYTOSIS BLD QL SMEAR: PRESENT
POLYCHROMASIA BLD QL SMEAR: PRESENT
RBC # BLD AUTO: 4.32 MILLION/UL (ref 3.81–5.12)
RBC MORPH BLD: PRESENT
VARIANT LYMPHS # BLD AUTO: 6 %
WBC # BLD AUTO: 8.58 THOUSAND/UL (ref 4.31–10.16)

## 2023-05-04 RX ORDER — HYDROCHLOROTHIAZIDE 25 MG/1
25 TABLET ORAL DAILY
Status: DISCONTINUED | OUTPATIENT
Start: 2023-05-04 | End: 2023-05-05 | Stop reason: HOSPADM

## 2023-05-04 RX ADMIN — METHOCARBAMOL 500 MG: 500 TABLET ORAL at 18:00

## 2023-05-04 RX ADMIN — ACETAMINOPHEN 975 MG: 325 TABLET ORAL at 05:17

## 2023-05-04 RX ADMIN — ACETAMINOPHEN 975 MG: 325 TABLET ORAL at 13:58

## 2023-05-04 RX ADMIN — METHOCARBAMOL 500 MG: 500 TABLET ORAL at 05:17

## 2023-05-04 RX ADMIN — CEFAZOLIN SODIUM 1000 MG: 1 SOLUTION INTRAVENOUS at 18:00

## 2023-05-04 RX ADMIN — ACETAMINOPHEN 975 MG: 325 TABLET ORAL at 21:52

## 2023-05-04 RX ADMIN — HEPARIN SODIUM 7500 UNITS: 5000 INJECTION INTRAVENOUS; SUBCUTANEOUS at 13:58

## 2023-05-04 RX ADMIN — AMLODIPINE BESYLATE 10 MG: 10 TABLET ORAL at 08:55

## 2023-05-04 RX ADMIN — HYDROCHLOROTHIAZIDE 25 MG: 25 TABLET ORAL at 10:32

## 2023-05-04 RX ADMIN — GABAPENTIN 100 MG: 100 CAPSULE ORAL at 08:55

## 2023-05-04 RX ADMIN — HEPARIN SODIUM 7500 UNITS: 5000 INJECTION INTRAVENOUS; SUBCUTANEOUS at 21:53

## 2023-05-04 RX ADMIN — GABAPENTIN 100 MG: 100 CAPSULE ORAL at 18:00

## 2023-05-04 RX ADMIN — METHOCARBAMOL 500 MG: 500 TABLET ORAL at 13:58

## 2023-05-04 RX ADMIN — CEFAZOLIN SODIUM 1000 MG: 1 SOLUTION INTRAVENOUS at 00:59

## 2023-05-04 RX ADMIN — CEFAZOLIN SODIUM 1000 MG: 1 SOLUTION INTRAVENOUS at 08:55

## 2023-05-04 RX ADMIN — HEPARIN SODIUM 7500 UNITS: 5000 INJECTION INTRAVENOUS; SUBCUTANEOUS at 05:17

## 2023-05-04 RX ADMIN — OXYCODONE HYDROCHLORIDE 5 MG: 5 TABLET ORAL at 01:35

## 2023-05-04 RX ADMIN — GABAPENTIN 100 MG: 100 CAPSULE ORAL at 21:52

## 2023-05-04 NOTE — CASE MANAGEMENT
Case Management Discharge Planning Note    Patient name Howard Hernandez  Location [facility] 210-44 MRN [id number]  : 1970 Date 2023       Current Admission Date: 2023  Current Admission Diagnosis:Sigmoid diverticulitis   Patient Active Problem List    Diagnosis Date Noted    Large bowel obstruction ([address] ) 2023    Pain of upper abdomen 2022    Essential hypertension 2020    Sigmoid diverticulitis 2018      LOS (days): 8  Geometric Mean LOS (GMLOS) (days): 5 30  Days to GMLOS:-2 3     OBJECTIVE:  Risk of Unplanned Readmission Score: 10 36         Current admission status: Inpatient   Preferred Pharmacy:   [address] ([facility]) [address] [place] [address] [facility] 63  [address]  Phone: [phone] Fax: [phone]    [facility] [address]IE[address] ROA [facility] [address] [place] [address]  [address] [place] [address]  Phone: [phone] Fax: [phone]    Primary Care Provider: Rand Hays DO    Primary Insurance: BLUE CROSS  Secondary Insurance:     DISCHARGE DETAILS:          Xiomara Marte requested[de-identified] Nursing  Home Health Services Needed[de-identified] Wound/Ostomy Care         Other Referral/Resources/Interventions Provided:  Referral Comments: [address] [facility] has accepted  Updated vendor that patient only needs RN services now as therapy has cleared her  Home with [address] [facility]/RN  Therapy cleared patient, no needs  [facility] [place]

## 2023-05-04 NOTE — UTILIZATION REVIEW
Elective Surgical Continued Stay Review    Date: 5/4    POD#: 8  Current Patient Class: Inpatient  Current Level of Care: Med Surg    Assessment/Plan: 48 y o  female, initial surgery date 4/26  S/p lap hand assisted sigmoidectomy with end colostomy, SBRx2, HELENA 4/26  Right GERMÁN 200 cc serosanguineous  Ostomy 300 cc both liquid and formed stool    Continue Iv antibiotics  Pt still with intermittent emesis  1 small episode this am   Stoma continues to be productive  Continue clear liquid diet for now  Pain control      Pertinent Labs/Diagnostic Results:       Results from last 7 days   Lab Units 05/04/23 0536 05/01/23  0433   WBC Thousand/uL 8 58 9 43   HEMOGLOBIN g/dL 12 5 12 0   HEMATOCRIT % 38 2 37 9   PLATELETS Thousands/uL 414* 325   NEUTROS ABS Thousands/µL  --  6 88   BANDS PCT % 2  --          Results from last 7 days   Lab Units 05/03/23 0459 05/02/23 0449 05/01/23  0433 04/30/23  0520 04/29/23  0318   SODIUM mmol/L 135 137 135 136 138   POTASSIUM mmol/L 3 9 4 0 3 8 3 3* 3 7   CHLORIDE mmol/L 108 109* 107 108 106   CO2 mmol/L 24 26 27 23 30   ANION GAP mmol/L 3* 2* 1* 5 2*   BUN mg/dL 4* 5 4* 6 8   CREATININE mg/dL 0 50* 0 49* 0 46* 0 35* 0 50*   EGFR ml/min/1 73sq m 110 111 113 124 110   CALCIUM mg/dL 8 3 8 2* 8 5 7 9* 8 6   MAGNESIUM mg/dL 1 9 1 9  --  2 5 1 8   PHOSPHORUS mg/dL  --   --   --   --  3 0             Results from last 7 days   Lab Units 05/03/23 0459 05/02/23 0449 05/01/23  0433 04/30/23  0520 04/29/23  0318 04/28/23  0555   GLUCOSE RANDOM mg/dL 104 108 135 89 98 130       Vital Signs:   05/04/23 15:28:29 98 °F (36 7 °C) 84 16 144/81 102 89 %   Abnormal  --   05/04/23 07:17:28 97 7 °F (36 5 °C) 64 16 133/68 90 90 % --   05/04/23 0235 -- -- 16 -- -- -- --   05/04/23 00:09:15 98 4 °F (36 9 °C) 86 16 162/93 116 89 %   Abnormal       Medications:   Scheduled Medications:  acetaminophen, 975 mg, Oral, Q8H DEMIAN  amLODIPine, 10 mg, Oral, Daily  cefazolin, 1,000 mg, Intravenous, Q8H  gabapentin, 100 mg, Oral, TID  heparin (porcine), 7,500 Units, Subcutaneous, Q8H DEMIAN  hydrochlorothiazide, 25 mg, Oral, Daily  methocarbamol, 500 mg, Oral, Q6H [address]      Continuous IV Infusions: None     PRN Meds:  HYDROmorphone, 0 5 mg, Intravenous, Q3H PRN  naloxone, 0 04 mg, Intravenous, Q1MIN PRN  ondansetron, 4 mg, Intravenous, Q6H PRN  oxyCODONE, 10 mg, Oral, Q4H PRN  oxyCODONE, 5 mg, Oral, Q4H PRN 5/4 x1  phenol, 1 spray, Mouth/Throat, Q2H PRN  saliva substitute, 5 spray, Mouth/Throat, 4x Daily PRN        Discharge Plan: Home when medically cleared    Network Utilization Review Department  ATTENTION: Please call with any questions or concerns to [phone] and carefully listen to the prompts so that you are directed to the right person  All voicemails are confidential   Maryana Merchant all requests for admission clinical reviews, approved or denied determinations and any other requests to dedicated fax number below belonging to the campus where the patient is receiving treatment   List of dedicated fax numbers for the Facilities:  [address] [place] [address] DENIALS (Administrative/Medical Necessity) [phone]   [address] (Maternity/NICU/Pediatrics) [phone]   [address] [place] [address]  [phone]   [address] [facility] [address] [place] [address] [phone]

## 2023-05-04 NOTE — PLAN OF CARE
Problem: Prexisting or High Potential for Compromised Skin Integrity  Goal: Skin integrity is maintained or improved  Description: INTERVENTIONS:  - Identify patients at risk for skin breakdown  - Assess and monitor skin integrity  - Assess and monitor nutrition and hydration status  - Monitor labs   - Assess for incontinence   - Turn and reposition patient  - Assist with mobility/ambulation  - Relieve pressure over bony prominences  - Avoid friction and shearing  - Provide appropriate hygiene as needed including keeping skin clean and dry  - Evaluate need for skin moisturizer/barrier cream  - Collaborate with interdisciplinary team   - Patient/family teaching  - Consider wound care consult   Outcome: Progressing     Problem: MOBILITY - ADULT  Goal: Maintain or return to baseline ADL function  Description: INTERVENTIONS:  -  Assess patient's ability to carry out ADLs; assess patient's baseline for ADL function and identify physical deficits which impact ability to perform ADLs (bathing, care of mouth/teeth, toileting, grooming, dressing, etc )  - Assess/evaluate cause of self-care deficits   - Assess range of motion  - Assess patient's mobility; develop plan if impaired  - Assess patient's need for assistive devices and provide as appropriate  - Encourage maximum independence but intervene and supervise when necessary  - Involve family in performance of ADLs  - Assess for home care needs following discharge   - Consider OT consult to assist with ADL evaluation and planning for discharge  - Provide patient education as appropriate  Outcome: Progressing  Goal: Maintains/Returns to pre admission functional level  Description: INTERVENTIONS:  - Perform BMAT or MOVE assessment daily    - Set and communicate daily mobility goal to care team and patient/family/caregiver     - Collaborate with rehabilitation services on mobility goals if consulted    - Ambulate patient 3 times a day  - Out of bed to chair 3 times a day   - Out of bed for meals 3 times a day  - Out of bed for toileting  - Record patient progress and toleration of activity level   Outcome: Progressing     Problem: PAIN - ADULT  Goal: Verbalizes/displays adequate comfort level or baseline comfort level  Description: Interventions:  - Encourage patient to monitor pain and request assistance  - Assess pain using appropriate pain scale  - Administer analgesics based on type and severity of pain and evaluate response  - Implement non-pharmacological measures as appropriate and evaluate response  - Consider cultural and social influences on pain and pain management  - Notify physician/advanced practitioner if interventions unsuccessful or patient reports new pain  Outcome: Progressing     Problem: INFECTION - ADULT  Goal: Absence or prevention of progression during hospitalization  Description: INTERVENTIONS:  - Assess and monitor for signs and symptoms of infection  - Monitor lab/diagnostic results  - Monitor all insertion sites, i e  indwelling lines, tubes, and drains  - Monitor endotracheal if appropriate and nasal secretions for changes in amount and color  - [place] appropriate cooling/warming therapies per order  - Administer medications as ordered  - Instruct and encourage patient and family to use good hand hygiene technique  - Identify and instruct in appropriate isolation precautions for identified infection/condition  Outcome: Progressing  Goal: Absence of fever/infection during neutropenic period  Description: INTERVENTIONS:  - Monitor WBC    Outcome: Progressing     Problem: SAFETY ADULT  Goal: Maintain or return to baseline ADL function  Description: INTERVENTIONS:  -  Assess patient's ability to carry out ADLs; assess patient's baseline for ADL function and identify physical deficits which impact ability to perform ADLs (bathing, care of mouth/teeth, toileting, grooming, dressing, etc )  - Assess/evaluate cause of self-care deficits   - Assess range of motion  - Assess patient's mobility; develop plan if impaired  - Assess patient's need for assistive devices and provide as appropriate  - Encourage maximum independence but intervene and supervise when necessary  - Involve family in performance of ADLs  - Assess for home care needs following discharge   - Consider OT consult to assist with ADL evaluation and planning for discharge  - Provide patient education as appropriate  Outcome: Progressing  Goal: Maintains/Returns to pre admission functional level  Description: INTERVENTIONS:  - Perform BMAT or MOVE assessment daily    - Set and communicate daily mobility goal to care team and patient/family/caregiver  - Collaborate with rehabilitation services on mobility goals if consulted    - Ambulate patient 3 times a day  - Out of bed to chair 3 times a day   - Out of bed for meals 3 times a day  - Out of bed for toileting  - Record patient progress and toleration of activity level   Outcome: Progressing  Goal: Patient will remain free of falls  Description: INTERVENTIONS:  - Educate patient/family on patient safety including physical limitations  - Instruct patient to call for assistance with activity   - Consult OT/PT to assist with strengthening/mobility   - Keep Call bell within reach  - Keep bed low and locked with side rails adjusted as appropriate  - Keep care items and personal belongings within reach  - Initiate and maintain comfort rounds  - Make Fall Risk Sign visible to staff  - Apply yellow socks and bracelet for high fall risk patients  - Consider moving patient to room near nurses station  Outcome: Progressing     Problem: Nutrition/Hydration-ADULT  Goal: Nutrient/Hydration intake appropriate for improving, restoring or maintaining nutritional needs  Description: Monitor and assess patient's nutrition/hydration status for malnutrition  Collaborate with interdisciplinary team and initiate plan and interventions as ordered    Monitor patient's weight and dietary intake as ordered or per policy  Utilize nutrition screening tool and intervene as necessary  Determine patient's food preferences and provide high-protein, high-caloric foods as appropriate       INTERVENTIONS:  - Monitor oral intake, urinary output, labs, and treatment plans  - Assess nutrition and hydration status and recommend course of action  - Evaluate amount of meals eaten  - Assist patient with eating if necessary   - Allow adequate time for meals  - Recommend/ encourage appropriate diets, oral nutritional supplements, and vitamin/mineral supplements  - Order, calculate, and assess calorie counts as needed  - Recommend, monitor, and adjust tube feedings and TPN/PPN based on assessed needs  - Assess need for intravenous fluids  - Provide specific nutrition/hydration education as appropriate  - Include patient/family/caregiver in decisions related to nutrition  Outcome: Progressing

## 2023-05-04 NOTE — PROGRESS NOTES
"Progress Note - Colorectal Surgery   Linh Garcia 48 y o  female MRN: [id number]  Unit/Bed#: [facility] 820-01 Encounter: [id number]    Assessment:  Linh Garcia is a 48 y o  female w/ sigmoid stricture s/p lap hand assisted sigmoidectomy with end colostomy, SBRx2, HELENA 4/26  Course c/b ileus    UOP 1 2L  Emesis 60cc  Right GERMÁN 60 cc serous  Ostomy 500 cc formed stool    Plan:   Low res diet w/ensures    Monitor output   Pain and nausea control as needed   Strict intake and output measurements   OOB/ambulate   Cont ostomy teaching   PT/OT- home health   Ancef on for c/f cellulitis   Please TigerText on call [facility] Colorectal Role if any further questions      Subjective/Objective     Subjective:   Patient seen and examined bedside  PO intake limited by taste of food  Not feeling nauseous this morning but vomited yesterday a small amount  Ambulating  Objective:    Blood pressure 162/93, pulse 86, temperature 98 4 °F (36 9 °C), resp  rate 16, height 4' 9\" (1 448 m), weight 84 8 kg (187 lb), SpO2 (!) 89 %  ,Body mass index is 40 47 kg/m²  Intake/Output Summary (Last 24 hours) at 5/4/2023 0557  Last data filed at 5/4/2023 0522  Gross per 24 hour   Intake --   Output 1770 ml   Net -1770 ml       Invasive Devices     Peripheral Intravenous Line  Duration           Peripheral IV 05/03/23 Proximal;Right;Ventral (anterior) Forearm <1 day          Drain  Duration           Closed/Suction Drain Right RLQ Bulb 7 days    Colostomy Transverse LUQ 7 days                Physical Exam:  General: No acute distress  Neuro: alert and oriented  HEENT: moist mucous membranes  CV: Well perfused, regular rate and rhythm  Lungs: Normal work of breathing, no increased respiratory effort  Abdomen: Soft, tender, distended  Incision clean, dry and intact  Ostomy with viable stoma and thicker stool per bag   RLQ drain with serous output  Extremities: No edema, clubbing or cyanosis  Skin: Warm, dry        Results from " last 7 days   Lab Units 05/01/23  0433   WBC Thousand/uL 9 43   HEMOGLOBIN g/dL 12 0   HEMATOCRIT % 37 9   PLATELETS Thousands/uL 325     Results from last 7 days   Lab Units 05/03/23  0459 05/02/23  0449 05/01/23  0433   POTASSIUM mmol/L 3 9 4 0 3 8   CHLORIDE mmol/L 108 109* 107   CO2 mmol/L 24 26 27   BUN mg/dL 4* 5 4*   CREATININE mg/dL 0 50* 0 49* 0 46*   CALCIUM mg/dL 8 3 8 2* 8 5            I have personally reviewed pertinent films in PACS      Medications:   Scheduled Meds:  Current Facility-Administered Medications   Medication Dose Route Frequency Provider Last Rate    acetaminophen  975 mg Oral [facility] Ant Shanks MD      amLODIPine  10 mg Oral Daily Ant Shanks MD      cefazolin  1,000 mg Intravenous Q8H Zaida Chiu PA-C 1,000 mg (05/04/23 0059)    gabapentin  100 mg Oral TID Carlo Simpson PA-C      heparin (porcine)  7,500 Units Subcutaneous [facility] Ant Shanks MD      HYDROmorphone  0 5 mg Intravenous Q3H PRN Zak Dobbins MD      methocarbamol  500 mg Oral Q6H [address] Ant Shanks MD      naloxone  0 04 mg Intravenous Q1MIN PRN Ant Shanks MD      ondansetron  4 mg Intravenous Q6H PRN Ant Shanks MD      oxyCODONE  10 mg Oral Q4H PRN Zak Dobbins MD      oxyCODONE  5 mg Oral Q4H PRN Zak Dobbins MD      phenol  1 spray Mouth/Throat Q2H PRN Woodford Gowers, MD      saliva substitute  5 spray Mouth/Throat 4x Daily PRN Woodford Gowers, MD       Continuous Infusions:   PRN Meds:  HYDROmorphone, 0 5 mg, Q3H PRN  naloxone, 0 04 mg, Q1MIN PRN  ondansetron, 4 mg, Q6H PRN  oxyCODONE, 10 mg, Q4H PRN  oxyCODONE, 5 mg, Q4H PRN  phenol, 1 spray, Q2H PRN  saliva substitute, 5 spray, 4x Daily PRN      VTE Pharmacologic Prophylaxis: Heparin  VTE Mechanical Prophylaxis: sequential compression device

## 2023-05-05 VITALS
WEIGHT: 187 LBS | TEMPERATURE: 98.1 F | HEIGHT: 57 IN | HEART RATE: 82 BPM | DIASTOLIC BLOOD PRESSURE: 72 MMHG | SYSTOLIC BLOOD PRESSURE: 120 MMHG | RESPIRATION RATE: 20 BRPM | OXYGEN SATURATION: 92 % | BODY MASS INDEX: 40.34 KG/M2

## 2023-05-05 LAB
ANION GAP SERPL CALCULATED.3IONS-SCNC: 3 MMOL/L (ref 4–13)
BUN SERPL-MCNC: 5 MG/DL (ref 5–25)
CALCIUM SERPL-MCNC: 8.4 MG/DL (ref 8.3–10.1)
CHLORIDE SERPL-SCNC: 104 MMOL/L (ref 96–108)
CO2 SERPL-SCNC: 27 MMOL/L (ref 21–32)
CREAT SERPL-MCNC: 0.41 MG/DL (ref 0.6–1.3)
GFR SERPL CREATININE-BSD FRML MDRD: 118 ML/MIN/1.73SQ M
GLUCOSE SERPL-MCNC: 99 MG/DL (ref 65–140)
POTASSIUM SERPL-SCNC: 3.4 MMOL/L (ref 3.5–5.3)
SODIUM SERPL-SCNC: 134 MMOL/L (ref 135–147)

## 2023-05-05 RX ORDER — OXYCODONE HYDROCHLORIDE 5 MG/1
5 TABLET ORAL EVERY 4 HOURS PRN
Qty: 16 TABLET | Refills: 0 | Status: SHIPPED | OUTPATIENT
Start: 2023-05-05 | End: 2023-05-09

## 2023-05-05 RX ORDER — POTASSIUM CHLORIDE 20 MEQ/1
40 TABLET, EXTENDED RELEASE ORAL ONCE
Status: COMPLETED | OUTPATIENT
Start: 2023-05-05 | End: 2023-05-05

## 2023-05-05 RX ORDER — METHOCARBAMOL 500 MG/1
500 TABLET, FILM COATED ORAL EVERY 8 HOURS SCHEDULED
Qty: 21 TABLET | Refills: 0 | Status: SHIPPED | OUTPATIENT
Start: 2023-05-05 | End: 2023-05-12

## 2023-05-05 RX ORDER — FLUCONAZOLE 200 MG/1
400 TABLET ORAL ONCE
Status: COMPLETED | OUTPATIENT
Start: 2023-05-05 | End: 2023-05-05

## 2023-05-05 RX ORDER — FLUCONAZOLE 200 MG/1
400 TABLET ORAL ONCE
Qty: 2 TABLET | Refills: 0 | Status: SHIPPED | OUTPATIENT
Start: 2023-05-05 | End: 2023-05-05

## 2023-05-05 RX ORDER — GABAPENTIN 100 MG/1
100 CAPSULE ORAL 3 TIMES DAILY
Qty: 21 CAPSULE | Refills: 0 | Status: SHIPPED | OUTPATIENT
Start: 2023-05-05 | End: 2023-05-12

## 2023-05-05 RX ORDER — ACETAMINOPHEN 325 MG/1
975 TABLET ORAL EVERY 8 HOURS SCHEDULED
Qty: 63 TABLET | Refills: 0 | Status: SHIPPED | OUTPATIENT
Start: 2023-05-05 | End: 2023-05-12

## 2023-05-05 RX ADMIN — POTASSIUM CHLORIDE 40 MEQ: 1500 TABLET, EXTENDED RELEASE ORAL at 09:21

## 2023-05-05 RX ADMIN — OXYCODONE HYDROCHLORIDE 10 MG: 10 TABLET ORAL at 10:18

## 2023-05-05 RX ADMIN — METHOCARBAMOL 500 MG: 500 TABLET ORAL at 01:00

## 2023-05-05 RX ADMIN — OXYCODONE HYDROCHLORIDE 10 MG: 10 TABLET ORAL at 15:18

## 2023-05-05 RX ADMIN — CEFAZOLIN SODIUM 1000 MG: 1 SOLUTION INTRAVENOUS at 09:22

## 2023-05-05 RX ADMIN — FLUCONAZOLE 400 MG: 200 TABLET ORAL at 12:44

## 2023-05-05 RX ADMIN — CEFAZOLIN SODIUM 1000 MG: 1 SOLUTION INTRAVENOUS at 01:04

## 2023-05-05 RX ADMIN — ACETAMINOPHEN 975 MG: 325 TABLET ORAL at 06:42

## 2023-05-05 RX ADMIN — GABAPENTIN 100 MG: 100 CAPSULE ORAL at 09:20

## 2023-05-05 RX ADMIN — AMLODIPINE BESYLATE 10 MG: 10 TABLET ORAL at 09:20

## 2023-05-05 RX ADMIN — HYDROCHLOROTHIAZIDE 25 MG: 25 TABLET ORAL at 09:20

## 2023-05-05 RX ADMIN — METHOCARBAMOL 500 MG: 500 TABLET ORAL at 06:42

## 2023-05-05 RX ADMIN — METHOCARBAMOL 500 MG: 500 TABLET ORAL at 12:45

## 2023-05-05 NOTE — WOUND OSTOMY CARE
Consult Note - Wound   Matthew Quiñones 48 y o  female MRN: [id number]  Unit/Bed#: [facility] 820-01 Encounter: [id number]        History and Present Illness:  Patient is a 49 yo female that was admitted to [facility] for treatment of Sigmoid Diverticulitis  Wound Care Consulted for Midline Wound Care Assistance by Colorectal Team      Assessment Findings:   1  Midline Wound: Irregular in shape area of at least partial thickness skin loss that extends from top of incision to bottom of incision  Incision located along curve of [place]  True depth was not probed by wound care team  Wound bed is 100% moist yellow slough tissue- cannot appreciate true depth visually related to slough tissue obscuring wound bed  Julia-wound is pink and fragile  Small serosanguinous drainage noted  Will recommend maxorb ag to wound bed and covering area with an ABD  Patient taught wound care changes- patient stated understanding of wound care changes  Colorectal team made aware of findings, assessment of area, and recommendations  No induration, fluctuance, odor, warmth/temperature differences, redness, or purulence noted to the above noted wounds and skin areas assessed  New dressings applied per orders listed below  Patient tolerated well- no s/s of non-verbal pain or discomfort observed during the encounter  Bedside nurse aware of plan of care  See flow sheets for more detailed assessment findings  Orders listed below and wound care will continue to follow, call or tiger text with questions  Skin Care Plan:  1-Cleanse Midline Incision Wound wit NS and pat dry  Apply Maxorb AG (Silver Alginate) to wound bed  Cover with 1/2 of ABD pad  Change daily or as needed with soilage         Wounds:  Wound 04/26/23 Abdomen N/A (Active)   Wound Image   05/05/23 0858   Wound Description Yellow;Slough 05/05/23 1500   Julia-wound Assessment Fragile;Ute 05/05/23 1500   Wound Length (cm) 6 cm 05/05/23 0858   Wound Width (cm) 0 7 cm 05/05/23 0858 Wound Depth (cm) 0 1 cm 05/05/23 0858   Wound Surface Area (cm^2) 4 2 cm^2 05/05/23 0858   Wound Volume (cm^3) 0 42 cm^3 05/05/23 0858   Calculated Wound Volume (cm^3) 0 42 cm^3 05/05/23 0858   Drainage Amount Small 05/05/23 1500   Drainage Description Serosanguineous 05/05/23 1500   Non-staged Wound Description Partial thickness 05/05/23 1500   Treatments Cleansed;Irrigation with NSS;Site care 05/05/23 1500   Dressing Calcium Alginate with Silver;ABD 05/05/23 1500   Dressing Changed New 05/05/23 1500   Patient Tolerance Tolerated well 05/05/23 1500   Dressing Status Clean;Dry; Intact 05/05/23 1500               [address] Lucila QUINTANA, BSN

## 2023-05-05 NOTE — DISCHARGE SUMMARY
Discharge Summary -colorectal surgery   Sean Walls 48 y o  female MRN: [id number]  Unit/Bed#: [facility] 820-01 Encounter: [id number]    Admission Date: 4/26/2023     Discharge Date: 5/5/2023    Admitting Diagnosis: Sigmoid diverticulitis [K57 [id number]]    Discharge Diagnosis: Sigmoid diverticulitis status post laparoscopic hand-assisted sigmoidectomy with end colostomy, small bowel resection x2 laps lysis of adhesions by Dr Joyce Mason    Attending and Service: Dr Joyce Mason, colorectal surgical services    Consulting Physician(s): APS, wound care for new ostomy teaching    Imaging and Procedures Performed:   4/26 lap hand assisted sigmoidectomy with end colostomy, SBRx2, LOAAlmond Amsler    Pathology: Pending    Hospital Course: Sean Walls is a 66-year-old female with PMHx of HTN, fibroids, tubal ligation (1991), and sigmoid diverticulitis presented for elective surgical intervention including laparoscopic hand-assisted sigmoidectomy with end colostomy, small bowel resection x2, lysis of adhesions by Dr Joyce Mason  She underwent intervention as mentioned above postoperatively she was transferred to the medical surgical unit  She was started on a clear liquid diet, maintained on IV fluids, had a Johnson in place, GERMÁN drain in place, and a new ostomy, she also had DVT prophylaxis, incentive spirometer at bedside, and as needed antiemetics and pain control including a Dilaudid PCA pump  Her Johnson was removed on postoperative day 1 and she was as tolerated on her diet  Unfortunately on postoperative day 3 she developed increased nausea, and emesis for which an NG tube was placed secondary to postoperative ileus development  Her NG tube was removed on postoperative day 4 as she was without nausea and her ostomy began functioning  Her diet was once again slowly advanced until she was able to tolerate a regular diet      By postoperative day 9 patient was out of bed and ambulating with minimal assistance, tolerating diet without nausea or vomiting, pain remained controlled on current p o  pain regimen, her GERMÁN drain was removed, she was urinating without any problems, and her colostomy was functioning  She was cleared for discharge  All discharge instructions as well as postoperative follow-up appointments and wound care was discussed with the patient, she was in agreement with plan  Oxycodone 5 mg as needed for moderate/severe pain, 16 tablets, no refills as well as gabapentin and Robaxin were sent to the patient's pharmacy  Wound care and ostomy teaching nurses provided supplies and teaching prior to discharge and throughout her hospital stay  PT/OT were consulted for assistance with early evaluation and postoperative ambulation  On discharge, the patient is instructed to follow-up with the patient's primary care provider within the next 2 weeks to review the events of the recent hospitalization  The patient is instructed to follow-up with Dr Shawanda Salinas in 2 to 3 weeks  The patient is instructed to follow the provided discharge instructions  Condition at Discharge: good     Discharge instructions/Information to patient and family:   See after visit summary for information provided to patient and family  Provisions for Follow-Up Care:  See after visit summary for information related to follow-up care and any pertinent home health orders  Disposition: Home with [facility]    Planned Readmission: No    Discharge Statement   I spent 30 minutes discharging the patient  This time was spent on the day of discharge  I had direct contact with the patient on the day of discharge  Additional documentation is required if more than 30 minutes were spent on discharge  Discharge Medications:  See after visit summary for reconciled discharge medications provided to patient and family      Gregory Villa PA-C  5/5/2023  10:37 AM

## 2023-05-05 NOTE — DISCHARGE INSTR - AVS FIRST PAGE
DISCHARGE INSTRUCTIONS    Follow Up: Follow up with Dr Harvinder Alvares in 2-3 weeks  Wound Care: Maxorb AG on base of midline incision with ABD pad on top  Diet: You may resume a regular diet    Pain: Tylenol 975 mg every 8 hours scheduled for 7 days  Robaxin 500 mg every 8 hours scheduled for 7 days  Gabapentin 100 mg 3 times a day for 7 days  Oxycodone 5 mg as needed for moderate/severe pain every 6 hours  Shower: You may shower over the wound  Do not bathe or use a pool or hot tub until cleared by the physician  Activity: As tolerated  You may go up and down stairs, walk as much as you are comfortable, but walk at least 3 times each day  Do not lift anything heavier than 15 pounds for at least 2-4 weeks, unless cleared by the doctor  Driving: Do not drive or make any important decisions while on narcotic pain medication  Generally, you may drive when your off all narcotic pain medications  Medications: Resume all of your previous medications, unless told otherwise by the doctor  Tylenol is always fine  You do not need to take the narcotic pain medications unless you are having significant pain and discomfort  Call the office: If you are experiencing any of the following, fevers above 101 5° or chills, significant nausea or vomiting, increase in abdominal pain, if the wound develops drainage and/or is excessive redness around the wound, or if you have significant diarrhea or other worsening symptoms

## 2023-05-05 NOTE — QUICK NOTE
Evaluated patient at bedside  Minimal drainage from GERMÁN bulb serous  Removed at bedside without complications, patient tolerated well  Replaced with 4x4 gauze secured with paper tape      Plan:  - nursing may change with saturation from previous  site  - patient given extra supplies for home and instructed on showering care    Completed with ADWOA Rubio, MS3

## 2023-05-05 NOTE — PROGRESS NOTES
"Progress Note - Colorectal   Jw Palacio 48 y o  female MRN: [id number]  Unit/Bed#: [facility] 820-01 Encounter: [id number]      Objective: Doing well this morning  No nausea, no further emesis  No fevers, no chills, no tachycardia  Has been out of bed ambulating the halls  She is tolerating a low residue diet well with ensures  Patient continues to have colostomy care and teaching  Complaining of draining around the GERMÁN site  States she feels she has a vaginal yeast infection from the antibiotics she was on  No dysuria or burning on urination  800 UA  70 GERMÁN all serous  Stool as well as air in the ostomy bag which is half full    Blood pressure 142/81, pulse 87, temperature 98 1 °F (36 7 °C), resp  rate 20, height 4' 9\" (1 448 m), weight 84 8 kg (187 lb), SpO2 (!) 87 %  ,Body mass index is 40 47 kg/m²  Intake/Output Summary (Last 24 hours) at 5/5/2023 0533  Last data filed at 5/5/2023 0201  Gross per 24 hour   Intake 230 ml   Output 870 ml   Net -640 ml       Invasive Devices     Peripheral Intravenous Line  Duration           Peripheral IV 05/03/23 Proximal;Right;Ventral (anterior) Forearm 1 day          Drain  Duration           Closed/Suction Drain Right RLQ Bulb 8 days    Colostomy Transverse LUQ 8 days                Physical Exam:   Abdomen: Soft, does not appear distended, small midline wound lower abdomen there is macerated area cleansed and redressed, minimal drainage from the midline wound GERMÁN drain right lower quadrant is all serous in nature, leaking serous fluid around the drain site  Extremities: No calf tenderness bilaterally and no edema    Lab, Imaging and other studies: BMP pending  VTE Pharmacologic Prophylaxis: Heparin  VTE Mechanical Prophylaxis: sequential compression device    Assessment:  POD # 9 laparoscopic hand-assisted sigmoidectomy with end colostomy, small bowel resection x2, lysis of adhesions  Postoperative ileus    Plan:  1  ?  Remove GERÁMN drain this morning  2    We will " give 1 dose of Diflucan 150 mg  3  To continue low residue diet  4  To continue care and teaching  5  Check wash midline wound with soap and water daily and cover with dry dressing  6  Continue to ambulate the halls throughout the day  7  Case management has set up visiting nurses for ostomy care when discharged  8  ?  Home today

## 2023-05-07 ENCOUNTER — HOME CARE VISIT (OUTPATIENT)
Dept: HOME HEALTH SERVICES | Facility: HOME HEALTHCARE | Age: 53
End: 2023-05-07

## 2023-05-07 VITALS
RESPIRATION RATE: 18 BRPM | HEART RATE: 78 BPM | DIASTOLIC BLOOD PRESSURE: 68 MMHG | TEMPERATURE: 97.8 F | WEIGHT: 187 LBS | BODY MASS INDEX: 40.34 KG/M2 | HEIGHT: 57 IN | SYSTOLIC BLOOD PRESSURE: 116 MMHG | OXYGEN SATURATION: 93 %

## 2023-05-08 NOTE — CASE COMMUNICATION
[facility] has admitted your patient to [address] [place] [address] service with the following disciplines:      SN  This report is informational only, no responses is needed  Primary focus of home health care- GI  Patient stated goals of care- independent wound ostomy care and midline abdominal wound to heal    Anticipated visit pattern and next visit date- Next SN visit for 5/10 2w8 2 PRN   See medication list - meds in home differ from AVS  Sign ificant clinical findings- all medications in home   Potential barriers to goal achievement- fatigues easily- hard wound location for pt manage care independently     Thank you for allowing us to participate in the care of your patient        [facility] Colon RN

## 2023-05-09 ENCOUNTER — HOME CARE VISIT (OUTPATIENT)
Dept: HOME HEALTH SERVICES | Facility: HOME HEALTHCARE | Age: 53
End: 2023-05-09

## 2023-05-10 ENCOUNTER — HOME CARE VISIT (OUTPATIENT)
Dept: HOME HEALTH SERVICES | Facility: HOME HEALTHCARE | Age: 53
End: 2023-05-10

## 2023-05-10 VITALS
HEART RATE: 90 BPM | RESPIRATION RATE: 16 BRPM | SYSTOLIC BLOOD PRESSURE: 110 MMHG | DIASTOLIC BLOOD PRESSURE: 70 MMHG | OXYGEN SATURATION: 97 % | TEMPERATURE: 97.7 F

## 2023-05-10 NOTE — CASE COMMUNICATION
Ship to   Home     [facility]   Insurance [employer]     Wound 1 abd wound   Full   Wound 2 ostomy   All items are ordered by the each unless otherwise noted    PLEASE DO NOT ORDER BY THE BOX  Request specific quantity needed  For Private Insurances order should be for a 2 week period    [employer] Clens [id number]- 1    Dry Dressings   (Nonsterile gauze not covered by private insurance)  (Sterile gauze may be requested for ins urance rather than nonsterile gauze)    Gauze 4x4 N/S 200 4x4s per unit  [id number]- 1   ABD 5x9 [id number] -8  Transpore white  2in 532011- 1  [place] with Silver [id number] -3  [employer]- REF: [id number] box   Dennise REF: [id number]

## 2023-05-15 ENCOUNTER — HOME CARE VISIT (OUTPATIENT)
Dept: HOME HEALTH SERVICES | Facility: HOME HEALTHCARE | Age: 53
End: 2023-05-15

## 2023-05-17 ENCOUNTER — HOME CARE VISIT (OUTPATIENT)
Dept: HOME HEALTH SERVICES | Facility: HOME HEALTHCARE | Age: 53
End: 2023-05-17

## 2023-05-17 VITALS
RESPIRATION RATE: 16 BRPM | HEART RATE: 88 BPM | DIASTOLIC BLOOD PRESSURE: 70 MMHG | OXYGEN SATURATION: 99 % | SYSTOLIC BLOOD PRESSURE: 110 MMHG | TEMPERATURE: 97.1 F

## 2023-05-23 ENCOUNTER — HOME CARE VISIT (OUTPATIENT)
Dept: HOME HEALTH SERVICES | Facility: HOME HEALTHCARE | Age: 53
End: 2023-05-23

## 2023-05-23 VITALS
OXYGEN SATURATION: 100 % | TEMPERATURE: 98.9 F | RESPIRATION RATE: 18 BRPM | DIASTOLIC BLOOD PRESSURE: 80 MMHG | HEART RATE: 88 BPM | SYSTOLIC BLOOD PRESSURE: 120 MMHG

## 2023-05-26 ENCOUNTER — HOME CARE VISIT (OUTPATIENT)
Dept: HOME HEALTH SERVICES | Facility: HOME HEALTHCARE | Age: 53
End: 2023-05-26

## 2023-05-26 VITALS
DIASTOLIC BLOOD PRESSURE: 70 MMHG | TEMPERATURE: 97.5 F | HEART RATE: 90 BPM | SYSTOLIC BLOOD PRESSURE: 112 MMHG | RESPIRATION RATE: 16 BRPM | OXYGEN SATURATION: 99 %

## 2023-05-30 NOTE — PROGRESS NOTES
Diagnoses and all orders for this visit:    Large bowel obstruction ([address] )       Large bowel obstruction [facility])  Patient presents for surgical follow-up  She is approxi-4 weeks status post sigmoid colectomy with end colostomy for near complete large bowel obstruction secondary to diverticular stricture  She is convalescing well  She still getting used to her colostomy but notes he is eating better with improving pain  Midline incision is almost completely healed  Colostomy is pink and viable and productive  We discussed that after surgical healing is taken place we give time for the more proximal bowel to completely decompress before attempt at reanastomosis  I discussed with her this will take at least 3 to 6 months  She may continue convalescing and resuming normal activities  She may eat as tolerated and may return to work in another 4 to 6 weeks if continued to improve  I will see her back in 2 months to start planning evaluation for return to the OR  JOSE G Bella is here today for a post operative evaluation  The patient notes her appetite is good, she notes her bowel output is good and denies any pain, bleeding or drainage form surgery sites  The patient is status post laparoscopic extensive lysis of adhesions, mobilization of splenic flexure, right colon resection, resection of small bowel x2, end colostomy on 4/26/2023  Post-Op Diagnosis Codes:     * Sigmoid diverticulitis     Surgical pathology:  A  Sigmoid colon (resection):  - Perforated diverticulitis with associated abscess   - Four (4) reactive lymph nodes (0/4)  B   Small bowel (resection):  - Small bowel with acute serositis  - Grossly identified serosal defects    Past Medical History:   Diagnosis Date   • Diverticulitis    • Diverticulosis    • Fibroids    • Hypertension      Past Surgical History:   Procedure Laterality Date   • COLONOSCOPY     • COLOSTOMY N/A 4/26/2023    Procedure: COLOSTOMY END;  Surgeon: Berry Webster MD;  [facility];  Service: Colorectal   • AR LAPAROSCOPY COLECTOMY PARTIAL W/ANASTOMOSIS N/A 4/26/2023    Procedure: LAPAROSCOPY, EXTENSIVE LYSIS ADHESIONS, MOBILIZATION SPLENIC FLEXURE, RESECTION COLON SIGMOID LAPAROSCOPIC;  Surgeon: Berry Webster MD;  [facility];  Service: Colorectal   • SMALL INTESTINE SURGERY N/A 4/26/2023    Procedure: RESECTION SMALL BOWEL  X 2;  Surgeon: Berry Webster MD;  Location: [facility];  Service: Colorectal   • TUBAL LIGATION  1991       Current Outpatient Medications:   •  amLODIPine (NORVASC) 10 mg tablet, Take 10 mg by mouth daily, Disp: , Rfl:   •  gabapentin (NEURONTIN) 100 mg capsule, Take 1 capsule (100 mg total) by mouth 3 (three) times a day for 7 days, Disp: 21 capsule, Rfl: 0  •  hydrochlorothiazide (HYDRODIURIL) 25 mg tablet, Take 25 mg by mouth daily, Disp: , Rfl:   •  methocarbamol (ROBAXIN) 500 mg tablet, Take 1 tablet (500 mg total) by mouth every 8 (eight) hours for 7 days, Disp: 21 tablet, Rfl: 0  Allergies as of 06/01/2023   • (No Known Allergies)     Review of Systems   All other systems reviewed and are negative  There were no vitals filed for this visit  Physical Exam  Constitutional:       Appearance: Normal appearance  HENT:      Head: Normocephalic and atraumatic  Eyes:      Extraocular Movements: Extraocular movements intact  Pupils: Pupils are equal, round, and reactive to light  Abdominal:          Comments: Healing surgical incisions   Musculoskeletal:         General: Normal range of motion  Skin:     General: Skin is warm and dry  Neurological:      General: No focal deficit present  Mental Status: She is alert and oriented to person, place, and time  Psychiatric:         Mood and Affect: Mood normal          Behavior: Behavior normal          Thought Content:  Thought content normal          Judgment: Judgment normal

## 2023-06-01 ENCOUNTER — OFFICE VISIT (OUTPATIENT)
Age: 53
End: 2023-06-01

## 2023-06-01 VITALS — WEIGHT: 172 LBS | BODY MASS INDEX: 37.11 KG/M2 | HEIGHT: 57 IN

## 2023-06-01 DIAGNOSIS — K56.609 LARGE BOWEL OBSTRUCTION (HCC): Primary | ICD-10-CM

## 2023-06-01 NOTE — ASSESSMENT & PLAN NOTE
Patient presents for surgical follow-up  She is approxi-4 weeks status post sigmoid colectomy with end colostomy for near complete large bowel obstruction secondary to diverticular stricture  She is convalescing well  She still getting used to her colostomy but notes he is eating better with improving pain  Midline incision is almost completely healed  Colostomy is pink and viable and productive  We discussed that after surgical healing is taken place we give time for the more proximal bowel to completely decompress before attempt at reanastomosis  I discussed with her this will take at least 3 to 6 months  She may continue convalescing and resuming normal activities  She may eat as tolerated and may return to work in another 4 to 6 weeks if continued to improve  I will see her back in 2 months to start planning evaluation for return to the OR

## 2023-06-15 ENCOUNTER — TELEPHONE (OUTPATIENT)
Age: 53
End: 2023-06-15

## 2023-06-15 NOTE — TELEPHONE ENCOUNTER
pc from pt she would like to have 1 week added to her disability, pt aware this can not be extend again     RTW 6/26/2023

## 2023-07-31 NOTE — PROGRESS NOTES
Diagnoses and all orders for this visit:    Sigmoid diverticulitis    Large bowel obstruction ([address])       Large bowel obstruction [facility])  Patient presents for follow-up after sigmoid colectomy and end colostomy for partial large bowel obstruction. She notes she is recovering nicely from surgery. Incisions are well-healed. Plan will be colonoscopy to evaluate proximal colon as this could not be done prior to surgery secondary to obstructive symptoms. Risks and benefits of colonoscopy reviewed with patient to include, but not be limited to: anesthesia, bleeding, missed lesion and perforation requiring surgery. Patient consents to procedure. JOSE G Polk is here today for surgical follow up. The patient notes good ostomy output and a good appetite; changing bag every 5 days. Denies any abdominal discomfort. Last seen in the office on 6/1/2023. The patient is status post laparoscopic extensive lysis of adhesions, mobilization of splenic flexure, right colon resection, resection of small bowel x2, end colostomy on 4/26/2023. Post-Op Diagnosis Codes:     * Sigmoid diverticulitis      Surgical pathology:  A. Sigmoid colon (resection):  - Perforated diverticulitis with associated abscess   - Four (4) reactive lymph nodes (0/4)  B.  Small bowel (resection):  - Small bowel with acute serositis  - Grossly identified serosal defects         Past Medical History:   Diagnosis Date   • Diverticulitis    • Diverticulosis    • Fibroids    • Hypertension      Past Surgical History:   Procedure Laterality Date   • COLONOSCOPY     • COLOSTOMY N/A 4/26/2023    Procedure: COLOSTOMY END;  Surgeon: Yovani Nelson MD;  [facility];  Service: Colorectal   • GA LAPAROSCOPY COLECTOMY PARTIAL W/ANASTOMOSIS N/A 4/26/2023    Procedure: LAPAROSCOPY, EXTENSIVE LYSIS ADHESIONS, MOBILIZATION SPLENIC FLEXURE, RESECTION COLON SIGMOID LAPAROSCOPIC;  Surgeon: Yovani Nelson MD;  [facility];  Service: Colorectal   • SMALL INTESTINE SURGERY N/A 4/26/2023    Procedure: RESECTION SMALL BOWEL  X 2;  Surgeon: Rakel Simon MD;  [facility];  Service: Colorectal   • TUBAL LIGATION  1991       Current Outpatient Medications:   •  amLODIPine (NORVASC) 10 mg tablet, Take 10 mg by mouth daily, Disp: , Rfl:   •  hydrochlorothiazide (HYDRODIURIL) 25 mg tablet, Take 25 mg by mouth daily, Disp: , Rfl:   •  gabapentin (NEURONTIN) 100 mg capsule, Take 1 capsule (100 mg total) by mouth 3 (three) times a day for 7 days, Disp: 21 capsule, Rfl: 0  •  methocarbamol (ROBAXIN) 500 mg tablet, Take 1 tablet (500 mg total) by mouth every 8 (eight) hours for 7 days, Disp: 21 tablet, Rfl: 0  Allergies as of 08/03/2023   • (No Known Allergies)     Review of Systems  Vitals:    08/03/23 0939   BP: 118/72     Physical Exam  Constitutional:       Appearance: Normal appearance. HENT:      Head: Normocephalic and atraumatic. Eyes:      Extraocular Movements: Extraocular movements intact. Pupils: Pupils are equal, round, and reactive to light. Abdominal:      General: Abdomen is flat. Palpations: Abdomen is soft. Comments: Healed surgical incisions. Pink budded left lower quadrant colostomy   Neurological:      Mental Status: She is alert.

## 2023-08-03 ENCOUNTER — TELEPHONE (OUTPATIENT)
Age: 53
End: 2023-08-03

## 2023-08-03 ENCOUNTER — OFFICE VISIT (OUTPATIENT)
Age: 53
End: 2023-08-03
Payer: COMMERCIAL

## 2023-08-03 VITALS
BODY MASS INDEX: 37.76 KG/M2 | HEIGHT: 57 IN | SYSTOLIC BLOOD PRESSURE: 118 MMHG | WEIGHT: 175 LBS | DIASTOLIC BLOOD PRESSURE: 72 MMHG

## 2023-08-03 DIAGNOSIS — K57.32 SIGMOID DIVERTICULITIS: Primary | ICD-10-CM

## 2023-08-03 DIAGNOSIS — K56.609 LARGE BOWEL OBSTRUCTION (HCC): ICD-10-CM

## 2023-08-03 DIAGNOSIS — K56.609 LARGE BOWEL OBSTRUCTION (HCC): Primary | ICD-10-CM

## 2023-08-03 PROCEDURE — 99213 OFFICE O/P EST LOW 20 MIN: CPT | Performed by: COLON & RECTAL SURGERY

## 2023-08-03 NOTE — TELEPHONE ENCOUNTER
OV 8/3/2023, colostomy 4/26/2023, BMI 37    Scheduled 8/15/2023 at [facility] w/DB    Paperwork handed to pt

## 2023-08-03 NOTE — ASSESSMENT & PLAN NOTE
Patient presents for follow-up after sigmoid colectomy and end colostomy for partial large bowel obstruction. She notes she is recovering nicely from surgery. Incisions are well-healed. Plan will be colonoscopy to evaluate proximal colon as this could not be done prior to surgery secondary to obstructive symptoms. Risks and benefits of colonoscopy reviewed with patient to include, but not be limited to: anesthesia, bleeding, missed lesion and perforation requiring surgery. Patient consents to procedure.

## 2023-08-03 NOTE — TELEPHONE ENCOUNTER
[facility] CODE: [id number], [id number], [id number], [id number], [id number], [address]: [address] [place] [address]

## 2023-08-16 ENCOUNTER — TELEPHONE (OUTPATIENT)
Age: 53
End: 2023-08-16

## 2023-08-16 NOTE — TELEPHONE ENCOUNTER
Patients GI provider:  Dr. Elizabeth Cardoza    Number to return call: [phone]    Reason for call: Pt calling to schedule a reversal procedure. Pt did not specify. Please reach out to pt.  Thank you    Scheduled procedure/appointment date if applicable: Apt/procedure n/a

## 2023-08-28 ENCOUNTER — PREP FOR PROCEDURE (OUTPATIENT)
Age: 53
End: 2023-08-28

## 2023-08-28 DIAGNOSIS — K56.609 INTESTINAL OBSTRUCTION, UNSPECIFIED CAUSE, UNSPECIFIED WHETHER PARTIAL OR COMPLETE (HCC): Primary | ICD-10-CM

## 2023-09-11 ENCOUNTER — TELEPHONE (OUTPATIENT)
Age: 53
End: 2023-09-11

## 2023-09-14 ENCOUNTER — TELEPHONE (OUTPATIENT)
Age: 53
End: 2023-09-14

## 2023-09-14 NOTE — TELEPHONE ENCOUNTER
Patients GI provider:  Dr. Austin Han    Number to return call: [phone]    Reason for call: Patient calling to see if short term disability paper work has been received. Also, asking if this is procedure is something she will be in the hospital for x amount of days.      Scheduled procedure/appointment date if applicable: Prrocedure  10/18/23

## 2023-09-15 ENCOUNTER — TELEPHONE (OUTPATIENT)
Age: 53
End: 2023-09-15

## 2023-09-18 ENCOUNTER — TELEPHONE (OUTPATIENT)
Age: 53
End: 2023-09-18

## 2023-09-18 NOTE — TELEPHONE ENCOUNTER
Patients GI provider:  Dr. Davis Nghia    Number to return call: ([phone]    Reason for call: Pt calling to confirm if Jarrod Glass received short term disability paper work  from [employer]. Pt would like to be contact regarding this matter.     Scheduled procedure/appointment date if applicable: 96.49.46

## 2023-09-20 NOTE — PRE-PROCEDURE INSTRUCTIONS
Pre-Surgery Instructions:   Medication Instructions   • amLODIPine (NORVASC) 10 mg tablet Take day of surgery. • hydrochlorothiazide (HYDRODIURIL) 25 mg tablet Hold day of surgery. Medication instructions for day surgery reviewed. Please use only a sip of water to take your instructed medications. Avoid all over the counter vitamins, supplements and NSAIDS for one week prior to surgery per anesthesia guidelines. Tylenol is ok to take as needed. You will receive a call one business day prior to surgery with an arrival time and hospital directions. If your surgery is scheduled on a Monday, the hospital will be calling you on the Friday prior to your surgery. If you have not heard from anyone by 8pm, please call the hospital supervisor through the hospital  at [phone]. [place] [phone]). Do not eat or drink anything after midnight the night before your surgery, including candy, mints, lifesavers, or chewing gum. Do not drink alcohol 24hrs before your surgery. Try not to smoke at least 24hrs before your surgery. Follow the pre surgery showering instructions as listed in the [facility] Surgical Experience Booklet” or otherwise provided by your surgeon's office. Do not shave the surgical area 24 hours before surgery. Do not apply any lotions, creams, including makeup, cologne, deodorant, or perfumes after showering on the day of your surgery. No contact lenses, eye make-up, or artificial eyelashes. Remove nail polish, including gel polish, and any artificial, gel, or acrylic nails if possible. Remove all jewelry including rings and body piercing jewelry. Wear causal clothing that is easy to take on and off. Consider your type of surgery. Keep any valuables, jewelry, piercings at home. Please bring any specially ordered equipment (sling, braces) if indicated. Arrange for a responsible person to drive you to and from the hospital on the day of your surgery.  Visitor Guidelines discussed. Call the surgeon's office with any new illnesses, exposures, or additional questions prior to surgery. Please reference your [facility] Surgical Experience Booklet” for additional information to prepare for your upcoming surgery. Pt instructed to stop nsaids and supplements one week prior to surgery. Pt verbalized understanding of shower, med, bowel prep and antibiotic instructions. Message sent to surgeons office to order antibiotics for patient for day before surgery.

## 2023-10-04 ENCOUNTER — APPOINTMENT (OUTPATIENT)
Dept: LAB | Facility: CLINIC | Age: 53
End: 2023-10-04
Payer: COMMERCIAL

## 2023-10-04 ENCOUNTER — LAB REQUISITION (OUTPATIENT)
Dept: LAB | Facility: HOSPITAL | Age: 53
End: 2023-10-04
Payer: COMMERCIAL

## 2023-10-04 DIAGNOSIS — K56.609 UNSPECIFIED INTESTINAL OBSTRUCTION, UNSPECIFIED AS TO PARTIAL VERSUS COMPLETE OBSTRUCTION (HCC): ICD-10-CM

## 2023-10-04 DIAGNOSIS — K56.609 LARGE BOWEL OBSTRUCTION (HCC): ICD-10-CM

## 2023-10-04 LAB
ABO GROUP BLD: NORMAL
ANION GAP SERPL CALCULATED.3IONS-SCNC: 5 MMOL/L
BASOPHILS # BLD AUTO: 0.07 THOUSANDS/ÂΜL (ref 0–0.1)
BASOPHILS NFR BLD AUTO: 1 % (ref 0–1)
BLD GP AB SCN SERPL QL: NEGATIVE
BUN SERPL-MCNC: 10 MG/DL (ref 5–25)
CALCIUM SERPL-MCNC: 9.2 MG/DL (ref 8.4–10.2)
CHLORIDE SERPL-SCNC: 102 MMOL/L (ref 96–108)
CO2 SERPL-SCNC: 27 MMOL/L (ref 21–32)
CREAT SERPL-MCNC: 0.62 MG/DL (ref 0.6–1.3)
EOSINOPHIL # BLD AUTO: 0.17 THOUSAND/ÂΜL (ref 0–0.61)
EOSINOPHIL NFR BLD AUTO: 2 % (ref 0–6)
ERYTHROCYTE [DISTWIDTH] IN BLOOD BY AUTOMATED COUNT: 14.2 % (ref 11.6–15.1)
GFR SERPL CREATININE-BSD FRML MDRD: 103 ML/MIN/1.73SQ M
GLUCOSE SERPL-MCNC: 86 MG/DL (ref 65–140)
HCT VFR BLD AUTO: 43 % (ref 34.8–46.1)
HGB BLD-MCNC: 14.1 G/DL (ref 11.5–15.4)
IMM GRANULOCYTES # BLD AUTO: 0.02 THOUSAND/UL (ref 0–0.2)
IMM GRANULOCYTES NFR BLD AUTO: 0 % (ref 0–2)
LYMPHOCYTES # BLD AUTO: 2.47 THOUSANDS/ÂΜL (ref 0.6–4.47)
LYMPHOCYTES NFR BLD AUTO: 34 % (ref 14–44)
MCH RBC QN AUTO: 28.3 PG (ref 26.8–34.3)
MCHC RBC AUTO-ENTMCNC: 32.8 G/DL (ref 31.4–37.4)
MCV RBC AUTO: 86 FL (ref 82–98)
MONOCYTES # BLD AUTO: 0.65 THOUSAND/ÂΜL (ref 0.17–1.22)
MONOCYTES NFR BLD AUTO: 9 % (ref 4–12)
NEUTROPHILS # BLD AUTO: 3.92 THOUSANDS/ÂΜL (ref 1.85–7.62)
NEUTS SEG NFR BLD AUTO: 54 % (ref 43–75)
NRBC BLD AUTO-RTO: 0 /100 WBCS
PLATELET # BLD AUTO: 236 THOUSANDS/UL (ref 149–390)
PMV BLD AUTO: 10.5 FL (ref 8.9–12.7)
POTASSIUM SERPL-SCNC: 3.4 MMOL/L (ref 3.5–5.3)
RBC # BLD AUTO: 4.99 MILLION/UL (ref 3.81–5.12)
RH BLD: POSITIVE
SODIUM SERPL-SCNC: 134 MMOL/L (ref 135–147)
SPECIMEN EXPIRATION DATE: NORMAL
WBC # BLD AUTO: 7.3 THOUSAND/UL (ref 4.31–10.16)

## 2023-10-04 PROCEDURE — 86900 BLOOD TYPING SEROLOGIC ABO: CPT | Performed by: COLON & RECTAL SURGERY

## 2023-10-04 PROCEDURE — 86850 RBC ANTIBODY SCREEN: CPT | Performed by: COLON & RECTAL SURGERY

## 2023-10-04 PROCEDURE — 36415 COLL VENOUS BLD VENIPUNCTURE: CPT

## 2023-10-04 PROCEDURE — 80048 BASIC METABOLIC PNL TOTAL CA: CPT

## 2023-10-04 PROCEDURE — 85025 COMPLETE CBC W/AUTO DIFF WBC: CPT

## 2023-10-04 PROCEDURE — 86901 BLOOD TYPING SEROLOGIC RH(D): CPT | Performed by: COLON & RECTAL SURGERY

## 2023-10-18 ENCOUNTER — HOSPITAL ENCOUNTER (INPATIENT)
Facility: HOSPITAL | Age: 53
LOS: 4 days | Discharge: HOME | DRG: 331 | End: 2023-10-22
Attending: COLON & RECTAL SURGERY | Admitting: COLON & RECTAL SURGERY
Payer: COMMERCIAL

## 2023-10-18 ENCOUNTER — ANESTHESIA EVENT (OUTPATIENT)
Dept: PERIOP | Facility: HOSPITAL | Age: 53
End: 2023-10-18
Payer: COMMERCIAL

## 2023-10-18 ENCOUNTER — ANESTHESIA (OUTPATIENT)
Dept: PERIOP | Facility: HOSPITAL | Age: 53
End: 2023-10-18
Payer: COMMERCIAL

## 2023-10-18 DIAGNOSIS — Z98.890 HISTORY OF COLOSTOMY REVERSAL: Primary | ICD-10-CM

## 2023-10-18 DIAGNOSIS — K56.609 INTESTINAL OBSTRUCTION, UNSPECIFIED CAUSE, UNSPECIFIED WHETHER PARTIAL OR COMPLETE (HCC): ICD-10-CM

## 2023-10-18 LAB
ANION GAP SERPL CALCULATED.3IONS-SCNC: 5 MMOL/L
BUN SERPL-MCNC: 9 MG/DL (ref 5–25)
CALCIUM SERPL-MCNC: 8.8 MG/DL (ref 8.4–10.2)
CHLORIDE SERPL-SCNC: 109 MMOL/L (ref 96–108)
CO2 SERPL-SCNC: 24 MMOL/L (ref 21–32)
CREAT SERPL-MCNC: 0.61 MG/DL (ref 0.6–1.3)
GFR SERPL CREATININE-BSD FRML MDRD: 103 ML/MIN/1.73SQ M
GLUCOSE SERPL-MCNC: 103 MG/DL (ref 65–140)
GLUCOSE SERPL-MCNC: 111 MG/DL (ref 65–140)
MAGNESIUM SERPL-MCNC: 1.7 MG/DL (ref 1.9–2.7)
PHOSPHATE SERPL-MCNC: 4 MG/DL (ref 2.7–4.5)
POTASSIUM SERPL-SCNC: 4.3 MMOL/L (ref 3.5–5.3)
SODIUM SERPL-SCNC: 138 MMOL/L (ref 135–147)

## 2023-10-18 PROCEDURE — 85007 BL SMEAR W/DIFF WBC COUNT: CPT

## 2023-10-18 PROCEDURE — 83735 ASSAY OF MAGNESIUM: CPT

## 2023-10-18 PROCEDURE — 0DSP0ZZ REPOSITION RECTUM, OPEN APPROACH: ICD-10-PCS | Performed by: COLON & RECTAL SURGERY

## 2023-10-18 PROCEDURE — 0DJD8ZZ INSPECTION OF LOWER INTESTINAL TRACT, VIA NATURAL OR ARTIFICIAL OPENING ENDOSCOPIC: ICD-10-PCS | Performed by: COLON & RECTAL SURGERY

## 2023-10-18 PROCEDURE — 44227 LAP CLOSE ENTEROSTOMY: CPT | Performed by: COLON & RECTAL SURGERY

## 2023-10-18 PROCEDURE — 80048 BASIC METABOLIC PNL TOTAL CA: CPT

## 2023-10-18 PROCEDURE — 88304 TISSUE EXAM BY PATHOLOGIST: CPT | Performed by: PATHOLOGY

## 2023-10-18 PROCEDURE — 84100 ASSAY OF PHOSPHORUS: CPT

## 2023-10-18 PROCEDURE — 85027 COMPLETE CBC AUTOMATED: CPT

## 2023-10-18 PROCEDURE — NC001 PR NO CHARGE: Performed by: COLON & RECTAL SURGERY

## 2023-10-18 PROCEDURE — 82948 REAGENT STRIP/BLOOD GLUCOSE: CPT

## 2023-10-18 PROCEDURE — 0DN80ZZ RELEASE SMALL INTESTINE, OPEN APPROACH: ICD-10-PCS | Performed by: COLON & RECTAL SURGERY

## 2023-10-18 RX ORDER — HYDROMORPHONE HCL/PF 1 MG/ML
0.5 SYRINGE (ML) INJECTION
Status: DISCONTINUED | OUTPATIENT
Start: 2023-10-18 | End: 2023-10-18 | Stop reason: HOSPADM

## 2023-10-18 RX ORDER — METHOCARBAMOL 500 MG/1
500 TABLET, FILM COATED ORAL EVERY 6 HOURS SCHEDULED
Status: DISCONTINUED | OUTPATIENT
Start: 2023-10-18 | End: 2023-10-19

## 2023-10-18 RX ORDER — SODIUM CHLORIDE, SODIUM LACTATE, POTASSIUM CHLORIDE, CALCIUM CHLORIDE 600; 310; 30; 20 MG/100ML; MG/100ML; MG/100ML; MG/100ML
125 INJECTION, SOLUTION INTRAVENOUS CONTINUOUS
Status: DISCONTINUED | OUTPATIENT
Start: 2023-10-18 | End: 2023-10-19

## 2023-10-18 RX ORDER — GABAPENTIN 300 MG/1
300 CAPSULE ORAL 3 TIMES DAILY
Status: DISCONTINUED | OUTPATIENT
Start: 2023-10-18 | End: 2023-10-19

## 2023-10-18 RX ORDER — LIDOCAINE HYDROCHLORIDE 10 MG/ML
INJECTION, SOLUTION EPIDURAL; INFILTRATION; INTRACAUDAL; PERINEURAL AS NEEDED
Status: DISCONTINUED | OUTPATIENT
Start: 2023-10-18 | End: 2023-10-18

## 2023-10-18 RX ORDER — SUCCINYLCHOLINE/SOD CL,ISO/PF 100 MG/5ML
SYRINGE (ML) INTRAVENOUS AS NEEDED
Status: DISCONTINUED | OUTPATIENT
Start: 2023-10-18 | End: 2023-10-18

## 2023-10-18 RX ORDER — HYDROMORPHONE HYDROCHLORIDE 2 MG/ML
INJECTION, SOLUTION INTRAMUSCULAR; INTRAVENOUS; SUBCUTANEOUS AS NEEDED
Status: DISCONTINUED | OUTPATIENT
Start: 2023-10-18 | End: 2023-10-18

## 2023-10-18 RX ORDER — MAGNESIUM SULFATE HEPTAHYDRATE 40 MG/ML
2 INJECTION, SOLUTION INTRAVENOUS ONCE
Status: COMPLETED | OUTPATIENT
Start: 2023-10-18 | End: 2023-10-18

## 2023-10-18 RX ORDER — HYDROMORPHONE HCL/PF 1 MG/ML
0.5 SYRINGE (ML) INJECTION
Status: DISCONTINUED | OUTPATIENT
Start: 2023-10-18 | End: 2023-10-19

## 2023-10-18 RX ORDER — FENTANYL CITRATE 50 UG/ML
INJECTION, SOLUTION INTRAMUSCULAR; INTRAVENOUS AS NEEDED
Status: DISCONTINUED | OUTPATIENT
Start: 2023-10-18 | End: 2023-10-18

## 2023-10-18 RX ORDER — ACETAMINOPHEN 325 MG/1
650 TABLET ORAL EVERY 6 HOURS SCHEDULED
Status: DISCONTINUED | OUTPATIENT
Start: 2023-10-18 | End: 2023-10-19

## 2023-10-18 RX ORDER — KETAMINE HCL IN NACL, ISO-OSM 100MG/10ML
SYRINGE (ML) INJECTION AS NEEDED
Status: DISCONTINUED | OUTPATIENT
Start: 2023-10-18 | End: 2023-10-18

## 2023-10-18 RX ORDER — METRONIDAZOLE 500 MG/100ML
INJECTION, SOLUTION INTRAVENOUS CONTINUOUS PRN
Status: DISCONTINUED | OUTPATIENT
Start: 2023-10-18 | End: 2023-10-18

## 2023-10-18 RX ORDER — PROPOFOL 10 MG/ML
INJECTION, EMULSION INTRAVENOUS AS NEEDED
Status: DISCONTINUED | OUTPATIENT
Start: 2023-10-18 | End: 2023-10-18

## 2023-10-18 RX ORDER — ROCURONIUM BROMIDE 10 MG/ML
INJECTION, SOLUTION INTRAVENOUS AS NEEDED
Status: DISCONTINUED | OUTPATIENT
Start: 2023-10-18 | End: 2023-10-18

## 2023-10-18 RX ORDER — AMLODIPINE BESYLATE 10 MG/1
10 TABLET ORAL DAILY
Status: DISCONTINUED | OUTPATIENT
Start: 2023-10-19 | End: 2023-10-22 | Stop reason: HOSPADM

## 2023-10-18 RX ORDER — ONDANSETRON 2 MG/ML
4 INJECTION INTRAMUSCULAR; INTRAVENOUS EVERY 6 HOURS PRN
Status: DISCONTINUED | OUTPATIENT
Start: 2023-10-18 | End: 2023-10-20

## 2023-10-18 RX ORDER — HEPARIN SODIUM 5000 [USP'U]/ML
5000 INJECTION, SOLUTION INTRAVENOUS; SUBCUTANEOUS EVERY 8 HOURS SCHEDULED
Status: DISCONTINUED | OUTPATIENT
Start: 2023-10-18 | End: 2023-10-22 | Stop reason: HOSPADM

## 2023-10-18 RX ORDER — MIDAZOLAM HYDROCHLORIDE 2 MG/2ML
INJECTION, SOLUTION INTRAMUSCULAR; INTRAVENOUS AS NEEDED
Status: DISCONTINUED | OUTPATIENT
Start: 2023-10-18 | End: 2023-10-18

## 2023-10-18 RX ORDER — LIDOCAINE HYDROCHLORIDE 10 MG/ML
0.5 INJECTION, SOLUTION EPIDURAL; INFILTRATION; INTRACAUDAL; PERINEURAL ONCE AS NEEDED
Status: COMPLETED | OUTPATIENT
Start: 2023-10-18 | End: 2023-10-18

## 2023-10-18 RX ORDER — SODIUM CHLORIDE, SODIUM GLUCONATE, SODIUM ACETATE, POTASSIUM CHLORIDE, MAGNESIUM CHLORIDE, SODIUM PHOSPHATE, DIBASIC, AND POTASSIUM PHOSPHATE .53; .5; .37; .037; .03; .012; .00082 G/100ML; G/100ML; G/100ML; G/100ML; G/100ML; G/100ML; G/100ML
125 INJECTION, SOLUTION INTRAVENOUS CONTINUOUS
Status: DISCONTINUED | OUTPATIENT
Start: 2023-10-18 | End: 2023-10-19

## 2023-10-18 RX ORDER — CEFAZOLIN SODIUM 2 G/50ML
SOLUTION INTRAVENOUS AS NEEDED
Status: DISCONTINUED | OUTPATIENT
Start: 2023-10-18 | End: 2023-10-18

## 2023-10-18 RX ORDER — ONDANSETRON 2 MG/ML
INJECTION INTRAMUSCULAR; INTRAVENOUS AS NEEDED
Status: DISCONTINUED | OUTPATIENT
Start: 2023-10-18 | End: 2023-10-18

## 2023-10-18 RX ORDER — DEXAMETHASONE SODIUM PHOSPHATE 10 MG/ML
INJECTION, SOLUTION INTRAMUSCULAR; INTRAVENOUS AS NEEDED
Status: DISCONTINUED | OUTPATIENT
Start: 2023-10-18 | End: 2023-10-18

## 2023-10-18 RX ADMIN — LIDOCAINE HYDROCHLORIDE 0.5 ML: 10 INJECTION, SOLUTION EPIDURAL; INFILTRATION; INTRACAUDAL; PERINEURAL at 10:04

## 2023-10-18 RX ADMIN — HYDROMORPHONE HYDROCHLORIDE 0.5 MG: 1 INJECTION, SOLUTION INTRAMUSCULAR; INTRAVENOUS; SUBCUTANEOUS at 14:41

## 2023-10-18 RX ADMIN — ROCURONIUM BROMIDE 20 MG: 10 INJECTION, SOLUTION INTRAVENOUS at 12:29

## 2023-10-18 RX ADMIN — FENTANYL CITRATE 100 MCG: 50 INJECTION, SOLUTION INTRAMUSCULAR; INTRAVENOUS at 11:37

## 2023-10-18 RX ADMIN — Medication 100 MG: at 11:43

## 2023-10-18 RX ADMIN — Medication 20 MG: at 11:51

## 2023-10-18 RX ADMIN — HEPARIN SODIUM 5000 UNITS: 5000 INJECTION INTRAVENOUS; SUBCUTANEOUS at 22:59

## 2023-10-18 RX ADMIN — METHOCARBAMOL 500 MG: 500 TABLET ORAL at 23:05

## 2023-10-18 RX ADMIN — SODIUM CHLORIDE 20 MCG: 900 INJECTION INTRAVENOUS at 14:08

## 2023-10-18 RX ADMIN — Medication 30 MG: at 11:45

## 2023-10-18 RX ADMIN — ROCURONIUM BROMIDE 30 MG: 10 INJECTION, SOLUTION INTRAVENOUS at 13:24

## 2023-10-18 RX ADMIN — METRONIDAZOLE: 500 SOLUTION INTRAVENOUS at 11:59

## 2023-10-18 RX ADMIN — SUGAMMADEX 200 MG: 100 INJECTION, SOLUTION INTRAVENOUS at 14:02

## 2023-10-18 RX ADMIN — GABAPENTIN 300 MG: 300 CAPSULE ORAL at 18:05

## 2023-10-18 RX ADMIN — MIDAZOLAM 2 MG: 1 INJECTION INTRAMUSCULAR; INTRAVENOUS at 11:27

## 2023-10-18 RX ADMIN — SODIUM CHLORIDE, SODIUM LACTATE, POTASSIUM CHLORIDE, AND CALCIUM CHLORIDE 125 ML/HR: .6; .31; .03; .02 INJECTION, SOLUTION INTRAVENOUS at 10:04

## 2023-10-18 RX ADMIN — ACETAMINOPHEN 650 MG: 325 TABLET, FILM COATED ORAL at 18:05

## 2023-10-18 RX ADMIN — LIDOCAINE HYDROCHLORIDE 100 MG: 10 INJECTION, SOLUTION EPIDURAL; INFILTRATION; INTRACAUDAL; PERINEURAL at 11:40

## 2023-10-18 RX ADMIN — METHOCARBAMOL 500 MG: 500 TABLET ORAL at 18:05

## 2023-10-18 RX ADMIN — HYDROMORPHONE HYDROCHLORIDE: 10 INJECTION, SOLUTION INTRAMUSCULAR; INTRAVENOUS; SUBCUTANEOUS at 15:15

## 2023-10-18 RX ADMIN — HYDROMORPHONE HYDROCHLORIDE 0.5 MG: 1 INJECTION, SOLUTION INTRAMUSCULAR; INTRAVENOUS; SUBCUTANEOUS at 14:32

## 2023-10-18 RX ADMIN — ROCURONIUM BROMIDE 50 MG: 10 INJECTION, SOLUTION INTRAVENOUS at 11:53

## 2023-10-18 RX ADMIN — DEXAMETHASONE SODIUM PHOSPHATE 10 MG: 10 INJECTION, SOLUTION INTRAMUSCULAR; INTRAVENOUS at 12:18

## 2023-10-18 RX ADMIN — ACETAMINOPHEN 650 MG: 325 TABLET, FILM COATED ORAL at 23:05

## 2023-10-18 RX ADMIN — PROPOFOL 150 MG: 10 INJECTION, EMULSION INTRAVENOUS at 11:43

## 2023-10-18 RX ADMIN — SODIUM CHLORIDE, SODIUM GLUCONATE, SODIUM ACETATE, POTASSIUM CHLORIDE, MAGNESIUM CHLORIDE, SODIUM PHOSPHATE, DIBASIC, AND POTASSIUM PHOSPHATE 125 ML/HR: .53; .5; .37; .037; .03; .012; .00082 INJECTION, SOLUTION INTRAVENOUS at 15:30

## 2023-10-18 RX ADMIN — HYDROMORPHONE HYDROCHLORIDE 0.5 MG: 2 INJECTION, SOLUTION INTRAMUSCULAR; INTRAVENOUS; SUBCUTANEOUS at 14:06

## 2023-10-18 RX ADMIN — CEFAZOLIN SODIUM 2000 MG: 2 SOLUTION INTRAVENOUS at 11:55

## 2023-10-18 RX ADMIN — ONDANSETRON 4 MG: 2 INJECTION INTRAMUSCULAR; INTRAVENOUS at 13:54

## 2023-10-18 RX ADMIN — MAGNESIUM SULFATE HEPTAHYDRATE 2 G: 40 INJECTION, SOLUTION INTRAVENOUS at 18:07

## 2023-10-18 RX ADMIN — GABAPENTIN 300 MG: 300 CAPSULE ORAL at 22:59

## 2023-10-18 NOTE — OP NOTE
OPERATIVE REPORT  PATIENT NAME: Carlton Wells    :  1970  MRN: [id number]  Pt Location: [facility] OR ROOM 07    SURGERY DATE: 10/18/2023    Surgeon(s) and Role:     * Agus Olivas MD - Primary     * Yajaira Marshall MD - Assisting    Preop Diagnosis:  Intestinal obstruction, unspecified cause, unspecified whether partial or complete ([address]) [K56.609]    Post-Op Diagnosis Codes:     * Intestinal obstruction, unspecified cause, unspecified whether partial or complete ([address]) [K56.609]    Procedure(s):  CLOSURE COLOSTOMY. LAPAROSCOPIC    Specimen(s):  ID Type Source Tests Collected by Time Destination   1 : RECTAL STUMP Tissue Rectum TISSUE EXAM Agus Olivas MD 10/18/2023 1308    2 : COLOSTOMY Tissue Colon TISSUE EXAM Agus Olivas MD 10/18/2023 1322        Estimated Blood Loss:   Minimal    Drains:  Colostomy Transverse LUQ (Active)   Stomal Appliance 1 piece 10/18/23 0940   Stoma Assessment RAHEL 10/18/23 0940   Stoma Shape Round 10/18/23 0940   Peristomal Assessment Pink 10/18/23 0940   Number of days: 175       Urethral Catheter Latex;Straight-tip 16 Fr. (Active)   Number of days: 0       [REMOVED] Closed/Suction Drain Right RLQ Bulb (Removed)   Number of days: 12       [REMOVED] NG/OG/Enteral Tube Nasogastric 18 Fr Right nare (Removed)   Number of days: 2       [REMOVED] Urethral Catheter Non-latex 16 Fr. (Removed)   Number of days: 2       Anesthesia Type:   General    Operative Indications:  Intestinal obstruction, unspecified cause, unspecified whether partial or complete ([address]) [K56.609]      Operative Findings: Moderate adhesions of small bowel and colon    Complications:   None    Procedure and Technique:  After preoperative identification in the preoperative holding area the patient was taken the operating room placed in the supine position.   After satisfactory induction of general endotracheal anesthesia appropriate placement of anesthesia monitors, patient was placed in a modified low lithotomy position. Patient was secured to the operating room table. Patient was prepped and draped in usual sterile fashion. Antibiotics were given prior to incision. Time-out was undertaken procedure begun. Given the proximity of her colostomy to other incisions, was decided to reopen the patient's lower midline. This was opened down to fascia. Fascia was incised sharply and GelPort was inserted. Left lower quadrant right lower quadrant right upper quadrant ports were then placed through which lysis of adhesions was performed. There was a fair amount of adhesions of the small bowel to the anterior abdominal wall in the pelvis. Eventually were able to dissect all of this free. We are able to dissect the small bowel off of the underneath side of the colostomy and the descending colon. We are eventually able to dissect the entire left colon up to the middle portion of the transverse colon to allow full mobilization. The pelvis was then inspected. Posteriorly were able to dissect down to the levators. Laterally and anteriorly we dissected the rectum. A portion of the mesorectum was cleared to allow a healthy end for anastomosis. Through the lower midline HandPort, Contour stapler was placed in the uppermost portion of the rectal stump was transected to allow for healthy tissue. We now prepared for anastomosis. Colostomy was circumferentially dissected free off of the skin and subcutaneous tissues. This was then brought out through the lower midline incision HandPort. The end portion was trimmed and a pursestring was placed using 2-0 PDS. Good healthy bleeding edges were noted. This was tightened down around a 29 mm [facility] stapler anvil head. This easily came into the pelvis. No significant mesenteric dissection was required of this. Perineal  then brought the stapler up to the transected rectal staple line. Daniel was deployed just anterior to the staple line.   Daniel was affixed to the anvil. This was then closed. Care is taken not to involve any extraneous directions within our anastomosis or twist in the mesentery. Stapler was completely closed and fired. Staple was removed from the anus. 2 full-thickness anastomotic rings were noted both proximally and distally. Pelvis was then filled with saline. Flexible sigmoidoscopy was undertaken. Direct inspection of the anastomosis appeared intact. Mucosa was pink and viable both proximally and distally. The anastomosis self was full-thickness circumferentially. No air leak was noted on air leak testing. As such we prepared for closure. Gown and gloves were changed. Ports and HandPort removed. All sponge and needle counts were correct. Clean instruments were used. Fascia was closed using #1 interrupted single-stranded PDS suture. Colostomy site was closed using staples. Skin was closed at other incision sites using 4-0 Monocryl. Exofin was used as skin dressing. Patient was awakened from anesthesia and transferred to recovery room in stable condition. I was present for the entire procedure.     Patient Disposition:  PACU     This procedure was not performed to treat colon cancer through resection      SIGNATURE: Ayse Mo MD  DATE: October 18, 2023  TIME: 1:51 PM

## 2023-10-18 NOTE — ANESTHESIA PREPROCEDURE EVALUATION
Procedure:  CLOSURE COLOSTOMY, LAPAROSCOPIC (Abdomen)  53Y/F with PMH of HTN, diverticulitis, SBO s/p colostomy few months ago, now presenting for above procedure  Relevant Problems   CARDIO   (+) Essential hypertension      GI/HEPATIC   (+) Large bowel obstruction (HCC)        Physical Exam    Airway    Mallampati score: II  TM Distance: >3 FB  Neck ROM: full     Dental   No notable dental hx     Cardiovascular  Cardiovascular exam normal    Pulmonary  Pulmonary exam normal     Other Findings        Anesthesia Plan  ASA Score- 3     Anesthesia Type- general with ASA Monitors. Additional Monitors:     Airway Plan: ETT. Plan Factors-Exercise tolerance (METS): >4 METS. Chart reviewed. EKG reviewed. Existing labs reviewed. Patient summary reviewed. Patient is a current smoker. Patient instructed to abstain from smoking on day of procedure. Patient did not smoke on day of surgery. Induction- intravenous. Postoperative Plan- Plan for postoperative opioid use. Planned trial extubation    Informed Consent- Anesthetic plan and risks discussed with patient and spouse.

## 2023-10-18 NOTE — QUICK NOTE
Post op check: Colorectal Surgery    Assessment   Patient is a 48 y.o. female status post end colostomy reversal on 10/18    Plan:  -Clear liquid diet  - Pain and nausea control as needed  - Isolyte at 125  - Encourage ambulation  - DVT prophylaxis  - Incentive spirometry  - Johnson: Strict ins and outs    Subjective: Pt feeling well. Pain is well controlled with PCA. Denies fevers/chills, chest pain, sob. Tolerating sips of clears without nausea and vomiting. Objective:  Blood pressure 123/90, pulse 60, temperature 98.2 °F (36.8 °C), resp. rate 12, height 4' 9" (1.448 m), weight 81.2 kg (179 lb), SpO2 95 %. I/O this shift:  In: -   Out: 250 [Urine:250]    Scheduled Meds:  Current Facility-Administered Medications   Medication Dose Route Frequency Provider Last Rate    acetaminophen  650 mg Oral Q6H Espinoza Fernandez MD      [START ON 10/19/2023] amLODIPine  10 mg Oral Daily Indira Elizalde MD      gabapentin  300 mg Oral TID Indira Elizalde MD      heparin (porcine)  5,000 Units Subcutaneous [facility] Indira Elizalde MD      HYDROmorphone   Intravenous Continuous Indira Elizalde MD      HYDROmorphone  0.5 mg Intravenous Q3H PRN Indira Elizalde MD      lactated ringers  125 mL/hr Intravenous Continuous Gael Barragan MD Stopped (10/18/23 1529)    magnesium sulfate  2 g Intravenous Once Indira Elizalde MD      methocarbamol  500 mg Oral Q6H [address] Indira Elizalde MD      multi-electrolyte  125 mL/hr Intravenous Continuous Indira Elizalde  mL/hr (10/18/23 1530)    naloxone  0.04 mg Intravenous Q3 min PRN Indira Elizalde MD      [START ON 10/19/2023] nicotine  1 patch Transdermal Daily Indira Elizalde MD      ondansetron  4 mg Intravenous Q6H PRN Indira Elizalde MD       Continuous Infusions:HYDROmorphone,   lactated ringers, 125 mL/hr, Last Rate: Stopped (10/18/23 1529)  multi-electrolyte, 125 mL/hr, Last Rate: 125 mL/hr (10/18/23 1530)      PRN Meds:. HYDROmorphone    naloxone    ondansetron      Physical Exam   General: NAD  Skin: Warm, dry, anicteric  HEENT: Normocephalic, atraumatic  CV: RRR  Pulm:  Nonlabored breathing on room air  Abd: Soft,, appropriately tender, nondistended; incisions clean dry and intact  MSK: Symmetric, no edema, no tenderness, no deformity  Neuro: AOx3, GCS 15       Iván Green MD

## 2023-10-18 NOTE — H&P
History and Physical   Colon and Rectal Surgery   Cindy Cantor 48 y.o. female MRN: [id number]  Unit/Bed#: [facility] [place] Encounter: [id number]  10/18/23   @NOW    No chief complaint on file.         History of Present Illness   HPI:  Cindy Cantor is a 48 y.o. female who presents for closure of colostomy      Historical Information   Past Medical History:   Diagnosis Date    Diverticulitis     Diverticulosis     Fibroids     Hypertension      Past Surgical History:   Procedure Laterality Date    COLONOSCOPY      COLOSTOMY N/A 4/26/2023    Procedure: COLOSTOMY END;  Surgeon: Ottie Sandhoff, MD;  Location: [facility];  Service: Colorectal    OH LAPAROSCOPY COLECTOMY PARTIAL W/ANASTOMOSIS N/A 4/26/2023    Procedure: LAPAROSCOPY, EXTENSIVE LYSIS ADHESIONS, MOBILIZATION SPLENIC FLEXURE, RESECTION COLON SIGMOID LAPAROSCOPIC;  Surgeon: Ottie Sandhoff, MD;  [facility];  Service: Colorectal    SMALL INTESTINE SURGERY N/A 4/26/2023    Procedure: RESECTION SMALL BOWEL  X 2;  Surgeon: Ottie Sandhoff, MD;  Location: [facility];  Service: Colorectal    TUBAL LIGATION  1991       Meds/Allergies     Medications Prior to Admission   Medication    amLODIPine (NORVASC) 10 mg tablet    hydrochlorothiazide (HYDRODIURIL) 25 mg tablet         Current Facility-Administered Medications:     lactated ringers infusion, 125 mL/hr, Intravenous, Continuous, Cristina Lucas MD, Last Rate: 125 mL/hr at 10/18/23 1004, 125 mL/hr at 10/18/23 1004    No Known Allergies      Social History   Social History     Substance and Sexual Activity   Alcohol Use Yes    Alcohol/week: 3.0 standard drinks of alcohol    Types: 3 Shots of liquor per week     Social History     Substance and Sexual Activity   Drug Use Never     Social History     Tobacco Use   Smoking Status Every Day    Types: Cigars   Smokeless Tobacco Never         Family History:   Family History   Problem Relation Age of Onset    Diverticulitis Mother     No Known Problems Father          Objective     Current Vitals:   Blood Pressure: 157/82 (10/18/23 0859)  Pulse: 67 (10/18/23 0859)  Temperature: 98.8 °F (37.1 °C) (10/18/23 0859)  Temp Source: Temporal (10/18/23 0859)  Height: 4' 9" (144.8 cm) (10/18/23 0921)  Weight - Scale: 81.2 kg (179 lb) (10/18/23 0921)  SpO2: 99 % (10/18/23 0859)  No intake or output data in the 24 hours ending 10/18/23 1106    Physical Exam:  General:  Resting comfortably in bed   Eyes:Sclera anicteric  ENT: Trachea midline  Pulm:  Symmetric chest raise. No respiratory Distress  CV:  Regular on monitor  Abdomen:  Soft NT ND  Extremities:  No clubbing/ cyanosis/ edema    Lab Results: I have personally reviewed pertinent lab results. Imaging: I have personally reviewed pertinent reports. ASSESSMENT:  Elva Mckeon is a 48 y.o. female who presents for colostomy closure with coloproctostomy. Laparoscopic possible open. OR for Colectomy. Risks of surgery, including but not limited to bleeding, infection, anastomotic leak, need for colostomy or enterostomy, injury or dysfunction of the bowel or urinary tract, injury or need for removal of other organs, sexual dysfunction, impotence, bowel obstruction in the future, hernia formation, bowel dysfunction, fecal incontinence, thromboembolic complications (deep vein clots or clots to the lungs), heart failure, heart attack, even death were reviewed. Questions were fully answered.

## 2023-10-18 NOTE — ANESTHESIA POSTPROCEDURE EVALUATION
Post-Op Assessment Note    CV Status:  Stable  Pain Score: 2    Pain management: adequate     Mental Status:  Alert   Hydration Status:  Stable   PONV Controlled:  None   Airway Patency:  Patent      Post Op Vitals Reviewed: Yes      Staff: Anesthesiologist         There were no known notable events for this encounter.     /67 (10/18/23 1427)    Temp 97.6 °F (36.4 °C) (10/18/23 1427)    Pulse 81 (10/18/23 1427)   Resp 12 (10/18/23 1427)    SpO2 95 % (10/18/23 1427)

## 2023-10-19 LAB
ANION GAP SERPL CALCULATED.3IONS-SCNC: 6 MMOL/L
BASOPHILS # BLD AUTO: 0.01 THOUSANDS/ÂΜL (ref 0–0.1)
BASOPHILS # BLD MANUAL: 0 THOUSAND/UL (ref 0–0.1)
BASOPHILS NFR BLD AUTO: 0 % (ref 0–1)
BASOPHILS NFR MAR MANUAL: 0 % (ref 0–1)
BUN SERPL-MCNC: 8 MG/DL (ref 5–25)
CALCIUM SERPL-MCNC: 8.5 MG/DL (ref 8.4–10.2)
CHLORIDE SERPL-SCNC: 106 MMOL/L (ref 96–108)
CO2 SERPL-SCNC: 26 MMOL/L (ref 21–32)
CREAT SERPL-MCNC: 0.44 MG/DL (ref 0.6–1.3)
EOSINOPHIL # BLD AUTO: 0.01 THOUSAND/ÂΜL (ref 0–0.61)
EOSINOPHIL # BLD MANUAL: 0.16 THOUSAND/UL (ref 0–0.4)
EOSINOPHIL NFR BLD AUTO: 0 % (ref 0–6)
EOSINOPHIL NFR BLD MANUAL: 1 % (ref 0–6)
ERYTHROCYTE [DISTWIDTH] IN BLOOD BY AUTOMATED COUNT: 13.6 % (ref 11.6–15.1)
ERYTHROCYTE [DISTWIDTH] IN BLOOD BY AUTOMATED COUNT: 13.8 % (ref 11.6–15.1)
GFR SERPL CREATININE-BSD FRML MDRD: 115 ML/MIN/1.73SQ M
GLUCOSE SERPL-MCNC: 102 MG/DL (ref 65–140)
HCT VFR BLD AUTO: 41.2 % (ref 34.8–46.1)
HCT VFR BLD AUTO: 49 % (ref 34.8–46.1)
HGB BLD-MCNC: 13.7 G/DL (ref 11.5–15.4)
HGB BLD-MCNC: 14.9 G/DL (ref 11.5–15.4)
IMM GRANULOCYTES # BLD AUTO: 0.05 THOUSAND/UL (ref 0–0.2)
IMM GRANULOCYTES NFR BLD AUTO: 0 % (ref 0–2)
LYMPHOCYTES # BLD AUTO: 1.07 THOUSANDS/ÂΜL (ref 0.6–4.47)
LYMPHOCYTES # BLD AUTO: 1.28 THOUSAND/UL (ref 0.6–4.47)
LYMPHOCYTES # BLD AUTO: 6 % (ref 14–44)
LYMPHOCYTES NFR BLD AUTO: 8 % (ref 14–44)
MAGNESIUM SERPL-MCNC: 2 MG/DL (ref 1.9–2.7)
MCH RBC QN AUTO: 27.7 PG (ref 26.8–34.3)
MCH RBC QN AUTO: 29.1 PG (ref 26.8–34.3)
MCHC RBC AUTO-ENTMCNC: 30.4 G/DL (ref 31.4–37.4)
MCHC RBC AUTO-ENTMCNC: 33.3 G/DL (ref 31.4–37.4)
MCV RBC AUTO: 88 FL (ref 82–98)
MCV RBC AUTO: 91 FL (ref 82–98)
MONOCYTES # BLD AUTO: 0.8 THOUSAND/UL (ref 0–1.22)
MONOCYTES # BLD AUTO: 1.17 THOUSAND/ÂΜL (ref 0.17–1.22)
MONOCYTES NFR BLD AUTO: 9 % (ref 4–12)
MONOCYTES NFR BLD: 5 % (ref 4–12)
NEUTROPHILS # BLD AUTO: 11.5 THOUSANDS/ÂΜL (ref 1.85–7.62)
NEUTROPHILS # BLD MANUAL: 13.73 THOUSAND/UL (ref 1.85–7.62)
NEUTS BAND NFR BLD MANUAL: 7 % (ref 0–8)
NEUTS SEG NFR BLD AUTO: 79 % (ref 43–75)
NEUTS SEG NFR BLD AUTO: 83 % (ref 43–75)
NRBC BLD AUTO-RTO: 0 /100 WBCS
PHOSPHATE SERPL-MCNC: 4.3 MG/DL (ref 2.7–4.5)
PLATELET # BLD AUTO: 232 THOUSANDS/UL (ref 149–390)
PLATELET # BLD AUTO: 233 THOUSANDS/UL (ref 149–390)
PLATELET BLD QL SMEAR: ADEQUATE
PMV BLD AUTO: 10.9 FL (ref 8.9–12.7)
PMV BLD AUTO: 11 FL (ref 8.9–12.7)
POTASSIUM SERPL-SCNC: 4.1 MMOL/L (ref 3.5–5.3)
RBC # BLD AUTO: 4.71 MILLION/UL (ref 3.81–5.12)
RBC # BLD AUTO: 5.37 MILLION/UL (ref 3.81–5.12)
RBC MORPH BLD: NORMAL
SODIUM SERPL-SCNC: 138 MMOL/L (ref 135–147)
VARIANT LYMPHS # BLD AUTO: 2 %
WBC # BLD AUTO: 13.81 THOUSAND/UL (ref 4.31–10.16)
WBC # BLD AUTO: 15.97 THOUSAND/UL (ref 4.31–10.16)

## 2023-10-19 PROCEDURE — 99024 POSTOP FOLLOW-UP VISIT: CPT | Performed by: COLON & RECTAL SURGERY

## 2023-10-19 PROCEDURE — 84100 ASSAY OF PHOSPHORUS: CPT

## 2023-10-19 PROCEDURE — 80048 BASIC METABOLIC PNL TOTAL CA: CPT

## 2023-10-19 PROCEDURE — 97167 OT EVAL HIGH COMPLEX 60 MIN: CPT

## 2023-10-19 PROCEDURE — 97163 PT EVAL HIGH COMPLEX 45 MIN: CPT

## 2023-10-19 PROCEDURE — 85025 COMPLETE CBC W/AUTO DIFF WBC: CPT

## 2023-10-19 PROCEDURE — 83735 ASSAY OF MAGNESIUM: CPT

## 2023-10-19 RX ORDER — METHOCARBAMOL 500 MG/1
500 TABLET, FILM COATED ORAL EVERY 8 HOURS SCHEDULED
Status: DISCONTINUED | OUTPATIENT
Start: 2023-10-19 | End: 2023-10-22 | Stop reason: HOSPADM

## 2023-10-19 RX ORDER — OXYCODONE HYDROCHLORIDE 10 MG/1
10 TABLET ORAL EVERY 4 HOURS PRN
Status: DISCONTINUED | OUTPATIENT
Start: 2023-10-19 | End: 2023-10-22 | Stop reason: HOSPADM

## 2023-10-19 RX ORDER — GABAPENTIN 100 MG/1
100 CAPSULE ORAL 3 TIMES DAILY
Status: DISCONTINUED | OUTPATIENT
Start: 2023-10-19 | End: 2023-10-22 | Stop reason: HOSPADM

## 2023-10-19 RX ORDER — OXYCODONE HYDROCHLORIDE 5 MG/1
5 TABLET ORAL EVERY 4 HOURS PRN
Status: DISCONTINUED | OUTPATIENT
Start: 2023-10-19 | End: 2023-10-22 | Stop reason: HOSPADM

## 2023-10-19 RX ORDER — HYDROMORPHONE HCL IN WATER/PF 6 MG/30 ML
0.2 PATIENT CONTROLLED ANALGESIA SYRINGE INTRAVENOUS EVERY 4 HOURS PRN
Status: DISCONTINUED | OUTPATIENT
Start: 2023-10-19 | End: 2023-10-20

## 2023-10-19 RX ORDER — ACETAMINOPHEN 325 MG/1
975 TABLET ORAL EVERY 8 HOURS SCHEDULED
Status: DISCONTINUED | OUTPATIENT
Start: 2023-10-19 | End: 2023-10-22 | Stop reason: HOSPADM

## 2023-10-19 RX ORDER — DEXTROSE MONOHYDRATE, SODIUM CHLORIDE, AND POTASSIUM CHLORIDE 50; 1.49; 4.5 G/1000ML; G/1000ML; G/1000ML
84 INJECTION, SOLUTION INTRAVENOUS CONTINUOUS
Status: DISCONTINUED | OUTPATIENT
Start: 2023-10-19 | End: 2023-10-20

## 2023-10-19 RX ADMIN — ACETAMINOPHEN 650 MG: 325 TABLET, FILM COATED ORAL at 05:41

## 2023-10-19 RX ADMIN — ACETAMINOPHEN 975 MG: 325 TABLET, FILM COATED ORAL at 21:57

## 2023-10-19 RX ADMIN — METHOCARBAMOL 500 MG: 500 TABLET ORAL at 05:41

## 2023-10-19 RX ADMIN — GABAPENTIN 300 MG: 300 CAPSULE ORAL at 09:07

## 2023-10-19 RX ADMIN — METHOCARBAMOL 500 MG: 500 TABLET ORAL at 14:35

## 2023-10-19 RX ADMIN — DEXTROSE, SODIUM CHLORIDE, AND POTASSIUM CHLORIDE 84 ML/HR: 5; .45; .15 INJECTION INTRAVENOUS at 12:16

## 2023-10-19 RX ADMIN — GABAPENTIN 100 MG: 100 CAPSULE ORAL at 14:40

## 2023-10-19 RX ADMIN — HEPARIN SODIUM 5000 UNITS: 5000 INJECTION INTRAVENOUS; SUBCUTANEOUS at 14:36

## 2023-10-19 RX ADMIN — METHOCARBAMOL 500 MG: 500 TABLET ORAL at 21:57

## 2023-10-19 RX ADMIN — GABAPENTIN 100 MG: 100 CAPSULE ORAL at 21:57

## 2023-10-19 RX ADMIN — ACETAMINOPHEN 975 MG: 325 TABLET, FILM COATED ORAL at 14:35

## 2023-10-19 RX ADMIN — HYDROMORPHONE HYDROCHLORIDE: 10 INJECTION, SOLUTION INTRAMUSCULAR; INTRAVENOUS; SUBCUTANEOUS at 12:34

## 2023-10-19 RX ADMIN — AMLODIPINE BESYLATE 10 MG: 10 TABLET ORAL at 09:06

## 2023-10-19 RX ADMIN — SODIUM CHLORIDE, SODIUM GLUCONATE, SODIUM ACETATE, POTASSIUM CHLORIDE, MAGNESIUM CHLORIDE, SODIUM PHOSPHATE, DIBASIC, AND POTASSIUM PHOSPHATE 125 ML/HR: .53; .5; .37; .037; .03; .012; .00082 INJECTION, SOLUTION INTRAVENOUS at 01:23

## 2023-10-19 RX ADMIN — OXYCODONE HYDROCHLORIDE 10 MG: 10 TABLET ORAL at 20:26

## 2023-10-19 RX ADMIN — SODIUM CHLORIDE, SODIUM GLUCONATE, SODIUM ACETATE, POTASSIUM CHLORIDE, MAGNESIUM CHLORIDE, SODIUM PHOSPHATE, DIBASIC, AND POTASSIUM PHOSPHATE 125 ML/HR: .53; .5; .37; .037; .03; .012; .00082 INJECTION, SOLUTION INTRAVENOUS at 09:07

## 2023-10-19 NOTE — PROGRESS NOTES
Progress Note - Colorectal Surgery   Mar Pair 48 y.o. female MRN: [id number]  Unit/Bed#: [facility] 834-01 Encounter: [id number]    Assessment:  42-year-old female s/p laparoscopic colostomy closure on 10/18    Afebrile, VSS, satting 96% on 3L NC   cc (350)  WBC 13.8 from 15.9 yesterday  Hgb 13.7 from 14.9  BMP pending  Plan:  - CLD advance as tolerated  - Awaiting return of bowel function  - Pain and nausea control as needed  - IVF with Isolyte  - Encourage OOB and I-S use  - DVT PPx  - DC Johnson    Subjective/Objective   Subjective: Patient seen and examined. NAEO. Pain well controlled with PCA, 4 pushes. Denies fever/chills, chest pain, shortness of breath. She is tolerating sips of clears. No N/V. Denies bowel function. Objective:     Blood pressure 138/81, pulse 69, temperature 98.3 °F (36.8 °C), resp. rate 19, height 4' 9" (1.448 m), weight 81.2 kg (179 lb), SpO2 95 %. ,Body mass index is 38.74 kg/m². Intake/Output Summary (Last 24 hours) at 10/19/2023 0556  Last data filed at 10/19/2023 0122  Gross per 24 hour   Intake 1000.8 ml   Output 600 ml   Net 400.8 ml       Invasive Devices       Peripheral Intravenous Line  Duration             Peripheral IV 10/18/23 Right Antecubital <1 day              Drain  Duration             Urethral Catheter Latex;Straight-tip 16 Fr. <1 day                    Physical Exam:   General - no acute distress, responsive and cooperative  CV - warm, regular rate  Pulm - normal work of breathing, no respiratory distress  Abd - soft, nondistended, appropriately tender. Surgical incision C/D/I and well approximated. Dressing in place without strikethrough.   Neuro - m/s grossly intact, cn grossly intact  Ext - moving all extremities

## 2023-10-19 NOTE — PLAN OF CARE
Problem: PAIN - ADULT  Goal: Verbalizes/displays adequate comfort level or baseline comfort level  Description: Interventions:  - Encourage patient to monitor pain and request assistance  - Assess pain using appropriate pain scale  - Administer analgesics based on type and severity of pain and evaluate response  - Implement non-pharmacological measures as appropriate and evaluate response  - Consider cultural and social influences on pain and pain management  - Notify physician/advanced practitioner if interventions unsuccessful or patient reports new pain  Outcome: Progressing     Problem: INFECTION - ADULT  Goal: Absence or prevention of progression during hospitalization  Description: INTERVENTIONS:  - Assess and monitor for signs and symptoms of infection  - Monitor lab/diagnostic results  - Monitor all insertion sites, i.e. indwelling lines, tubes, and drains  - Monitor endotracheal if appropriate and nasal secretions for changes in amount and color  - [place] appropriate cooling/warming therapies per order  - Administer medications as ordered  - Instruct and encourage patient and family to use good hand hygiene technique  - Identify and instruct in appropriate isolation precautions for identified infection/condition  Outcome: Progressing  Goal: Absence of fever/infection during neutropenic period  Description: INTERVENTIONS:  - Monitor WBC    Outcome: Progressing     Problem: SAFETY ADULT  Goal: Patient will remain free of falls  Description: INTERVENTIONS:  - Educate patient/family on patient safety including physical limitations  - Instruct patient to call for assistance with activity   - Consult OT/PT to assist with strengthening/mobility   - Keep Call bell within reach  - Keep bed low and locked with side rails adjusted as appropriate  - Keep care items and personal belongings within reach  - Initiate and maintain comfort rounds  - Make Fall Risk Sign visible to staff  - Offer Toileting every 2 Hours, in advance of need  - Apply yellow socks and bracelet for high fall risk patients  - Consider moving patient to room near nurses station  Outcome: Progressing  Goal: Maintain or return to baseline ADL function  Description: INTERVENTIONS:  -  Assess patient's ability to carry out ADLs; assess patient's baseline for ADL function and identify physical deficits which impact ability to perform ADLs (bathing, care of mouth/teeth, toileting, grooming, dressing, etc.)  - Assess/evaluate cause of self-care deficits   - Assess range of motion  - Assess patient's mobility; develop plan if impaired  - Assess patient's need for assistive devices and provide as appropriate  - Encourage maximum independence but intervene and supervise when necessary  - Involve family in performance of ADLs  - Assess for home care needs following discharge   - Consider OT consult to assist with ADL evaluation and planning for discharge  - Provide patient education as appropriate  Outcome: Progressing  Goal: Maintains/Returns to pre admission functional level  Description: INTERVENTIONS:  - Perform BMAT or MOVE assessment daily.   - Set and communicate daily mobility goal to care team and patient/family/caregiver. - Collaborate with rehabilitation services on mobility goals if consulted  - Perform Range of Motion 3 times a day. - Reposition patient every 2 hours.   - Dangle patient 3 times a day  - Stand patient 3 times a day  - Ambulate patient 3 times a day  - Out of bed to chair 3 times a day   - Out of bed for meals 3 times a day  - Out of bed for toileting  - Record patient progress and toleration of activity level   Outcome: Progressing     Problem: DISCHARGE PLANNING  Goal: Discharge to home or other facility with appropriate resources  Description: INTERVENTIONS:  - Identify barriers to discharge w/patient and caregiver  - Arrange for needed discharge resources and transportation as appropriate  - Identify discharge learning needs (meds, wound care, etc.)  - Arrange for interpretive services to assist at discharge as needed  - Refer to Case Management Department for coordinating discharge planning if the patient needs post-hospital services based on physician/advanced practitioner order or complex needs related to functional status, cognitive ability, or social support system  Outcome: Progressing     Problem: Knowledge Deficit  Goal: Patient/family/caregiver demonstrates understanding of disease process, treatment plan, medications, and discharge instructions  Description: Complete learning assessment and assess knowledge base. Interventions:  - Provide teaching at level of understanding  - Provide teaching via preferred learning methods  Outcome: Progressing     Problem: MOBILITY - ADULT  Goal: Maintain or return to baseline ADL function  Description: INTERVENTIONS:  -  Assess patient's ability to carry out ADLs; assess patient's baseline for ADL function and identify physical deficits which impact ability to perform ADLs (bathing, care of mouth/teeth, toileting, grooming, dressing, etc.)  - Assess/evaluate cause of self-care deficits   - Assess range of motion  - Assess patient's mobility; develop plan if impaired  - Assess patient's need for assistive devices and provide as appropriate  - Encourage maximum independence but intervene and supervise when necessary  - Involve family in performance of ADLs  - Assess for home care needs following discharge   - Consider OT consult to assist with ADL evaluation and planning for discharge  - Provide patient education as appropriate  Outcome: Progressing  Goal: Maintains/Returns to pre admission functional level  Description: INTERVENTIONS:  - Perform BMAT or MOVE assessment daily.   - Set and communicate daily mobility goal to care team and patient/family/caregiver. - Collaborate with rehabilitation services on mobility goals if consulted  - Perform Range of Motion 3 times a day.   - Reposition patient every 2 hours.   - Dangle patient 3 times a day  - Stand patient 3 times a day  - Ambulate patient 3 times a day  - Out of bed to chair 3 times a day   - Out of bed for meals 3 times a day  - Out of bed for toileting  - Record patient progress and toleration of activity level   Outcome: Progressing

## 2023-10-19 NOTE — CASE MANAGEMENT
Case Management Assessment & Discharge Planning Note    Patient name Sarah Pimentel  Location [facility] 834/[facility] 328-37 MRN [id number]  : 1970 Date 10/19/2023       Current Admission Date: 10/18/2023  Current Admission Diagnosis:Intestinal obstruction, unspecified cause, unspecified whether partial or complete [facility])   Patient Active Problem List    Diagnosis Date Noted    Large bowel obstruction ([address]) 2023    Pain of upper abdomen 2022    Essential hypertension 2020    Sigmoid diverticulitis 2018      LOS (days): 1  Geometric Mean LOS (GMLOS) (days):   Days to GMLOS:     OBJECTIVE:    Risk of Unplanned Readmission Score: 6.66         Current admission status: Inpatient       Preferred Pharmacy:   [address]  ([place] ([place], [place] - [address]  [address] [facility]  Phone: [phone] Fax: [phone]    [address] [place] [address] ROAD  [address] [address]  Phone: [phone] Fax: [address] [place], [address] [facility] [address] [facility] [address] [place] [address] [address] [facility] [address]  Phone: [phone] Fax: [phone]    Primary Care Provider: Carlton Candelaria DO    Primary Insurance: BLUE CROSS  Secondary Insurance:     ASSESSMENT:  Active Health Care Proxies    There are no active Health Care Proxies on file.                  Readmission Root Cause  30 Day Readmission: No    Patient Information  Admitted from[de-identified] Home  Mental Status: Alert  During Assessment patient was accompanied by: Not accompanied during assessment  Assessment information provided by[de-identified] Patient  Primary Caregiver: Self  Support Systems: Spouse/significant other  [place] of Residence: [address] do you live in?: [facility]  Type of Current Residence: [facility]  In the last 12 months, was there a time when you were not able to pay the mortgage or rent on time?: No  In the last  months, was there a time when you did not have a steady place to sleep or slept in a shelter (including now)?: No  Homeless/housing insecurity resource given?: N/A  Living Arrangements: Lives w/ Spouse/significant other  Is patient a ?: No    Activities of Daily Living Prior to Admission  Functional Status: Independent  Completes ADLs independently?: Yes  Ambulates independently?: Yes  Does patient use assisted devices?: No  Does patient currently own DME?: No  Does patient have a history of Outpatient Therapy (PT/OT)?: No  Does the patient have a history of Short-Term Rehab?: No  Does patient have a history of HHC?: Yes ([facility] for ostomy)  Does patient currently have [address]?: No         Patient Information Continued  Income Source: Employed  Does patient have prescription coverage?: Yes  Within the past 12 months, you worried that your food would run out before you got the money to buy more.: Never true  Within the past 12 months, the food you bought just didn't last and you didn't have money to get more.: Never true  Food insecurity resource given?: N/A  Does patient receive dialysis treatments?: No  Does patient have a history of substance abuse?: No  Does patient have a history of Mental Health Diagnosis?: No         Means of Transportation  Means of Transport to Appts[de-identified] Drives Self  In the past 12 months, has lack of transportation kept you from medical appointments or from getting medications?: No  In the past 12 months, has lack of transportation kept you from meetings, work, or from getting things needed for daily living?: No  Was application for public transport provided?: N/A        DISCHARGE DETAILS:    Discharge planning discussed with[de-identified] patient  Freedom of Choice: Yes  Comments - Freedom of Choice: FOC discussed, no anticipated d/c needs  CM contacted family/caregiver?: No- see comments (pt AAO)  Were Treatment Team discharge recommendations reviewed with patient/caregiver?: Yes  Did patient/caregiver verbalize understanding of patient care needs?: Yes  Were patient/caregiver advised of the risks associated with not following Treatment Team discharge recommendations?: Yes    Contacts  Patient Contacts: Love Nella  Relationship to Patient[de-identified] Family  Contact Method: Phone  Phone Number: [phone]  Reason/Outcome: Continuity of Care, Emergency Contact, Discharge [address]         Is the patient interested in [address] at discharge?: No    DME Referral Provided  Referral made for DME?: No                     Additional Comments: CM introduced self and role to patient at bedside. Patient reports she resides in a ranch home with her , 2 MARY. Patient is IADL and ambulates independently. Hx of [facility] through [facility] for ostomy supplies, no STR or DME hx noted. Patient drives but  will transport home upon d/c. CM to continue to follow. Patient/caregiver received discharge checklist.  Content reviewed. Patient/caregiver encouraged to participate in discharge plan of care prior to discharge home. CM reviewed d/c planning process including the following: identifying help at home, patient preference for d/c planning needs, Discharge Lounge, [facility] Meds to Bed program, availability of treatment team to discuss questions or concerns patient and/or family may have regarding understanding medications and recognizing signs and symptoms once discharged. CM also encouraged patient to follow up with all recommended appointments after discharge. Patient advised of importance for patient and family to participate in managing patient’s medical well being.

## 2023-10-19 NOTE — UTILIZATION REVIEW
Initial Clinical Review    Elective Inpatient surgical procedure  Age/Sex: 48 y.o. female  Surgery Date: 10/18/23  Procedure: CLOSURE COLOSTOMY. LAPAROSCOPIC   Anesthesia: general  Operative Findings: Moderate adhesions of small bowel and colon     POD#1 Progress Note:  Pain well controlled with PCA, 4 pushes. She is tolerating sips of clears. Denies bowel function. Abd - soft, nondistended, appropriately tender. Surgical incision C/D/I and well approximated. Dressing in place without strikethrough. Advance diet as tolerated, await return of bowel function, continue pain and nausea control prn, continue IVF, inc spirometry. Admission Orders: Date/Time/Statement:   Admission Orders (From admission, onward)       Ordered        10/18/23 1457  Inpatient Admission  Once                          Orders Placed This Encounter   Procedures    Inpatient Admission     Standing Status:   Standing     Number of Occurrences:   1     Order Specific Question:   Level of Care     Answer:   Med Surg [16]     Order Specific Question:   Estimated length of stay     Answer:   More than 2 Midnights     Order Specific Question:   Certification     Answer:   I certify that inpatient services are medically necessary for this patient for a duration of greater than two midnights. See H&P and MD Progress Notes for additional information about the patient's course of treatment.      Vital Signs: /76   Pulse 63   Temp 98.3 °F (36.8 °C)   Resp 16   Ht 4' 9" (1.448 m)   Wt 81.2 kg (179 lb)   LMP  (LMP Unknown)   SpO2 98%   BMI 38.74 kg/m²     Pertinent Labs/Diagnostic Test Results:   No orders to display         Results from last 7 days   Lab Units 10/19/23  0510 10/18/23  1548   WBC Thousand/uL 13.81* 15.97*   HEMOGLOBIN g/dL 13.7 14.9   HEMATOCRIT % 41.2 49.0*   PLATELETS Thousands/uL 233 232   NEUTROS ABS Thousands/µL 11.50*  --    BANDS PCT %  --  7         Results from last 7 days   Lab Units 10/19/23  0510 10/18/23  1548   SODIUM mmol/L 138 138   POTASSIUM mmol/L 4.1 4.3   CHLORIDE mmol/L 106 109*   CO2 mmol/L 26 24   ANION GAP mmol/L 6 5   BUN mg/dL 8 9   CREATININE mg/dL 0.44* 0.61   EGFR ml/min/1.73sq m 115 103   CALCIUM mg/dL 8.5 8.8   MAGNESIUM mg/dL 2.0 1.7*   PHOSPHORUS mg/dL 4.3 4.0         Results from last 7 days   Lab Units 10/18/23  1439   POC GLUCOSE mg/dl 111     Results from last 7 days   Lab Units 10/19/23  0510 10/18/23  1548   GLUCOSE RANDOM mg/dL 102 103       Diet: clear liquids  Mobility: OOB and ambulatory  DVT Prophylaxis: heparin, scd, ambulation    Medications/Pain Control:   Scheduled Medications:  acetaminophen, 650 mg, Oral, Q6H DEMIAN  amLODIPine, 10 mg, Oral, Daily  gabapentin, 300 mg, Oral, TID  heparin (porcine), 5,000 Units, Subcutaneous, Q8H DEMIAN  methocarbamol, 500 mg, Oral, Q6H [address]  nicotine, 1 patch, Transdermal, Daily      Continuous IV Infusions:  HYDROmorphone, , Intravenous, Continuous  lactated ringers, 125 mL/hr, Intravenous, Continuous  multi-electrolyte, 125 mL/hr, Intravenous, Continuous      PRN Meds:  HYDROmorphone, 0.5 mg, Intravenous, Q3H PRN  naloxone, 0.04 mg, Intravenous, Q3 min PRN  ondansetron, 4 mg, Intravenous, Q6H PRN        Network Utilization Review Department  ATTENTION: Please call with any questions or concerns to [phone] and carefully listen to the prompts so that you are directed to the right person. All voicemails are confidential.   For Discharge needs, contact Care Management DC Support Team at [phone] opt. 2  Send all requests for admission clinical reviews, approved or denied determinations and any other requests to dedicated fax number below belonging to the campus where the patient is receiving treatment.  List of dedicated fax numbers for the Facilities:  [place] [facility] (Administrative/Medical Necessity) [phone]   DISCHARGE SUPPORT TEAM [phone] [facility] (Maternity/NICU/Pediatrics) [address] [facility] [address] [facility] [phone] 1505 [facility] [address] [address] [facility] [address] [phone]

## 2023-10-19 NOTE — OCCUPATIONAL THERAPY NOTE
Occupational Therapy Evaluation     Patient Name: Obed Andrew  JVUHN'O Date: 10/19/2023  Problem List  Active Problems: There are no active Hospital Problems. Past Medical History  Past Medical History:   Diagnosis Date    Diverticulitis     Diverticulosis     Fibroids     Hypertension      Past Surgical History  Past Surgical History:   Procedure Laterality Date    COLONOSCOPY      COLOSTOMY N/A 4/26/2023    Procedure: COLOSTOMY END;  Surgeon: Shmuel Gama MD;  [facility];  Service: Colorectal    GA LAPAROSCOPY COLECTOMY PARTIAL W/ANASTOMOSIS N/A 4/26/2023    Procedure: LAPAROSCOPY, EXTENSIVE LYSIS ADHESIONS, MOBILIZATION SPLENIC FLEXURE, RESECTION COLON SIGMOID LAPAROSCOPIC;  Surgeon: Shmuel Gama MD;  [facility];  Service: Colorectal    GA LAPS CLSR NTRSTM LG/SM INT W/RESCJ & ANASTOMOSIS N/A 10/18/2023    Procedure: CLOSURE COLOSTOMY, LAPAROSCOPIC;  Surgeon: Shmuel Gama MD;  [facility];  Service: Colorectal    SMALL INTESTINE SURGERY N/A 4/26/2023    Procedure: RESECTION SMALL BOWEL  X 2;  Surgeon: Shmuel Gama MD;  [facility];  Service: Colorectal    TUBAL LIGATION  1991           10/19/23 0947   OT Last Visit   OT Visit Date 10/19/23   Note Type   Note type Evaluation   Additional Comments Pt seen w/ PT to increase safety, decrease fall risk, and maximize functional/occupational performance 2* medical complexity which is a regression from pt's functional baseline. Pain Assessment   Pain Assessment Tool 0-10   Pain Score 7   Pain Location/Orientation Location: Abdomen   Effect of Pain on Daily Activities Impacts ability to engage in valued occupations   Hospital Pain Intervention(s) Repositioned; Ambulation/increased activity; Emotional support   Restrictions/Precautions   Weight Bearing Precautions Per Order No   Other Precautions Multiple lines;O2;Fall Risk;Pain  (PCA pump, rod, 3L O2)   Home Living   Type of Home House  (1 SH with 1 MARY)   Home Layout One level;Performs ADLs on one level; Able to live on main level with bedroom/bathroom; Access   Bathroom Shower/Tub Tub/shower unit   Bathroom Toilet Standard   Bathroom Equipment Other (Comment)  (No DME)   [address]   Additional Comments Pt lives in a 1 story home with 1 MARY with her frankie; access to RW but did not use PTA   Prior Function   Level of [place] Independent with ADLs; Independent with functional mobility; Independent with IADLS   Lives With Significant other   Receives Help From Family   IADLs Independent with driving; Independent with meal prep; Independent with medication management   Falls in the last 6 months 0   Vocational Full time employment   Comments (+) driving; pt reporting that she is not working for the next couple weeks however she typically works manufacturing medical tubing   Lifestyle   Autonomy PTA, pt was independent in ADLs/IADLs and functional mobility w/ no DME; (+) driving   Reciprocal Relationships Lives with her frankie   Service to Others Reports working full time in [address] [place] [address] Drive Enjoys camping trips   Subjective   Subjective "I am up so let's do this."   ADL   Where Assessed Chair   Eating Assistance 5  Supervision/Setup   Grooming Assistance 5  Supervision/Setup    [address] 4  Minimal Assistance    [address] 4  Minimal Assistance   Bed Mobility   Supine to Sit 5  Supervision   Additional items HOB elevated; Bedrails; Increased time required;Verbal cues   Sit to Supine Unable to assess   Additional Comments Increased time during transitional movements during supine <> sit; @ end of session, pt left sitting upright in recliner with all functional needs in reach   Transfers   Sit to Stand 4  Minimal assistance  (CGA)   Additional items Assist x 1; Increased time required;Verbal cues   Stand to Sit 4  Minimal assistance  (CGA)   Additional items Increased time required;Verbal cues   Additional Comments CGA w/ RW   Functional Mobility   Functional Mobility 4  Minimal assistance  (CGA)   Additional Comments Pt completed household functional mobility distances w/ CGA x1 w/ RW for safety and support with increased time   Additional items Rolling walker   Balance   Static Sitting Good   Dynamic Sitting Fair +   Static Standing Fair   Dynamic Standing Fair -   Ambulatory Fair -   Activity Tolerance   Activity Tolerance Patient tolerated treatment well;Patient limited by fatigue   Medical Staff Made Aware ARIANNA Rae   Nurse Made Aware RN cleared   RUE Assessment   RUE Assessment WFL   LUE Assessment   LUE Assessment WFL   Hand Function   Gross Motor Coordination Functional   Fine Motor Coordination Functional   Cognition   Overall Cognitive Status WFL   Arousal/Participation Alert; Responsive;Arousable; Cooperative   Attention Within functional limits   Orientation Level Oriented X4   Memory Within functional limits   Following Commands Follows all commands and directions without difficulty   Comments Pt pleasant and cooperative   Assessment   Limitation Decreased ADL status; Decreased endurance;Decreased self-care trans;Decreased high-level ADLs   Prognosis Fair   Assessment Pt is a 47 yo female admitted to [facility] s/p "CLOSURE COLOSTOMY. LAPAROSCOPIC" on 10/18. Pt  has a past medical history of Diverticulitis, Diverticulosis, Fibroids, and Hypertension. Pt with active OT orders in which OT consulted to assess pt's functional status and occupational performance to determine safe d/c needs.  Pt seen with PT to increase safety, decrease fall risk, and maximize functional/occupational performance 2* medical complexity which is a regression from pt's functional baseline. Pt lives with her frankie in a 1 story home with 1 MARY. PTA, pt was independent in ADLs/IADLs and uses no DME for functional mobility. (+) driving. Currently, pt performing bed mobility w/ supervision, functional transfers/mobility w/ CGA x1 w/ RW, and UB/LB ADLs w/ Min A. Pt demonstrates the following limitations/impairments which impact the pt's ability to engage in valued occupations: endurance/activity tolerance, standing tolerance, functional reach, postural/trunk control, strength, safety awareness, and pain. From an OT standpoint, recommend discharge to home with increased support/assistance once medically stable. The patient's raw score on the -PAC Daily Activity Inpatient Short Form is 19. A raw score of greater than or equal to 19 suggests the patient may benefit from discharge to home. Please refer to the recommendation of the Occupational Therapist for safe discharge planning. Pt would benefit from skilled OT services 2-3x/wk to address immediate acute care needs and underlying performance skills to promote safety, decrease fall risk, and enhance occupational performance to return to [facility]. Goals to be met within the next 10-14 days. Goals   Patient Goals To go home   LTG Time Frame 10-14   Long Term Goal #1 See OT goals listed below   Plan   Treatment Interventions ADL retraining;Functional transfer training;UE strengthening/ROM; Endurance training;Patient/family training;Equipment evaluation/education; Compensatory technique education;Continued evaluation; Energy conservation; Activityengagement   Goal Expiration Date 11/02/23   OT Frequency 2-3x/wk   Discharge Recommendation   OT Discharge Recommendation No rehabilitation needs  (Increased support/assistance upon d/c)   AM-PAC Daily Activity Inpatient   Lower Body Dressing 3   Bathing 3   Toileting 3   Upper Body Dressing 3   Grooming 3   Eating 4   Daily Activity Raw Score 19   Daily Activity Standardized Score (Calc for Raw Score >=11) 40.22   AM-PAC Applied Cognition Inpatient   Following a Speech/Presentation 4   Understanding Ordinary Conversation 4   Taking Medications 4   Remembering Where Things Are Placed or Put Away 4   Remembering List of 4-5 Errands 4   Taking Care of Complicated Tasks 4   Applied Cognition Raw Score 24   Applied Cognition Standardized Score 62.21   End of Consult   Education Provided Yes   Patient Position at End of Consult Bedside chair; All needs within reach   Nurse Communication Nurse aware of consult     OT GOALS:    Pt will improve functional mobility during ADL/IADL/leisure tasks with Mod I using AE/DME prn. Pt will improve activity tolerance/functional endurance during ADL/IADL/leisure tasks for at least 20 minutes to improve occupational performance and engagement in valued occupations using AE/DME prn. Pt will engage in ongoing functional/formal cognitive assessments to assist with safe d/c planning and increase safety during functional tasks. Pt will participate in simulated IADL management task w/ Mod I to increase independence and engagement in valued occupations w/ good balance/safety. Pt will improve dynamic standing balance for at least 15 minutes with Mod I during functional tasks to decrease fall risk and improve independence and engagement in ADL/IADL/leisure activities. Pt will follow 100% of multi-step commands in ADL/IADL/leisure activities to improve functional cognition used in functional daily routines. Pt will complete functional transfers on and off all surfaces used in daily routines with Mod I for safety to maximize functional/occupational performance. Pt will complete all bed mobility tasks with Mod I to serve as a prerequisite for EOB/OOB ADL/IADL/leisure tasks, optimize positioning/comfort, and increase functional independence.     Pt will independently demonstrate good carryover of safety precautions and education/training during ADL/IADL/leisure tasks with energy conservation techniques s/p skilled instruction without verbal cues. Pt will complete UB ADL tasks with Mod I using AE/DME prn to increase functional independence in ADL/IADL/leisure tasks. Pt will complete LB ADL tasks with Mod I using AE/DME prn to increase functional independence in ADL/IADL/leisure tasks. Pt will complete toileting tasks with Mod I and good hygiene/thoroughness using AE/DME prn to increase functional independence. Pt will independently identify and utilize 2-3 positive coping strategies to enhance overall wellbeing and engagement in valued occupations.     Will Valentine MS, OTR/L

## 2023-10-19 NOTE — PHYSICAL THERAPY NOTE
Physical Therapy Evaluation     Patient's Name: Sergey Rodriguez    Admitting Diagnosis  Intestinal obstruction, unspecified cause, unspecified whether partial or complete [facility]) [L26.048]    Problem List  Patient Active Problem List   Diagnosis    Sigmoid diverticulitis    Essential hypertension    Pain of upper abdomen    Large bowel obstruction [facility])       Past Medical History  Past Medical History:   Diagnosis Date    Diverticulitis     Diverticulosis     Fibroids     Hypertension        Past Surgical History  Past Surgical History:   Procedure Laterality Date    COLONOSCOPY      COLOSTOMY N/A 4/26/2023    Procedure: COLOSTOMY END;  Surgeon: Stew Maurer MD;  [facility];  Service: Colorectal    MI LAPAROSCOPY COLECTOMY PARTIAL W/ANASTOMOSIS N/A 4/26/2023    Procedure: LAPAROSCOPY, EXTENSIVE LYSIS ADHESIONS, MOBILIZATION SPLENIC FLEXURE, RESECTION COLON SIGMOID LAPAROSCOPIC;  Surgeon: Stew Maurer MD;  [facility];  Service: Colorectal    MI LAPS CLSR NTRSTM LG/SM INT W/RESCJ & ANASTOMOSIS N/A 10/18/2023    Procedure: CLOSURE COLOSTOMY, LAPAROSCOPIC;  Surgeon: Stew Maurer MD;  [facility];  Service: Colorectal    SMALL INTESTINE SURGERY N/A 4/26/2023    Procedure: RESECTION SMALL BOWEL  X 2;  Surgeon: Stew Maurer MD;  [facility];  Service: Colorectal    TUBAL LIGATION  1991          10/19/23 1008   PT Last Visit   PT Visit Date 10/19/23   Note Type   Note type Evaluation   Pain Assessment   Pain Assessment Tool 0-10   Pain Location/Orientation Location: Abdomen   Pain Onset/Description Onset: Ongoing; Descriptor: Pressure; Descriptor: Discomfort   Effect of Pain on Daily Activities Limited mobility   Hospital Pain Intervention(s) Repositioned; Ambulation/increased activity   Restrictions/Precautions   Weight Bearing Precautions Per Order No   Other Precautions Multiple lines;O2;Fall Risk;Pain  (3L O2, Johnson, PCA pump)   Home Living   Type of [address] One level  (1 MARY)   Bathroom Shower/Tub Tub/shower unit   Bathroom Toilet Standard   Home Equipment Walker   Additional Comments Pt lives in [facility] with 1 MARY   Prior Function   Level of [place] Independent with ADLs; Independent with functional mobility; Independent with IADLS   Lives With Other (Comment)  (Fiance)   Receives Help From Other (Comment)  (Fiance)   IADLs Independent with medication management; Independent with meal prep; Independent with driving   Falls in the last 6 months 0   Vocational Full time employment  (Off work at this time  2* medical issues)   Comments Pt reports being Ind for mobility and ADLs, with fiance able to assist as needed   General   Family/Caregiver Present No   Cognition   Overall Cognitive Status WFL   Arousal/Participation Alert   Orientation Level Oriented X4   Following Commands Follows all commands and directions without difficulty   Comments Pt pleasant and cooperative during session   Subjective   Subjective Agreeable to mobilize   RLE Assessment   RLE Assessment WFL   LLE Assessment   LLE Assessment WFL   Bed Mobility   Supine to Sit 5  Supervision   Additional items HOB elevated; Increased time required;Verbal cues   Sit to Supine Unable to assess   Additional Comments Pt noted to be in bed upon arrival. Pt able to get to EOB with supervision, increased time 2* abdominal pain   Transfers   Sit to Stand 4  Minimal assistance  (CG A)   Additional items Increased time required;Verbal cues   Stand to Sit 5  Supervision   Additional items Verbal cues   Additional Comments w/RW   Ambulation/Elevation   Gait pattern Forward Flexion   Gait Assistance 4  Minimal assist  (CG Assist)   Additional items Assist x 1;Verbal cues   Assistive Device Rolling walker   Distance 100 ft   Ambulation/Elevation Additional Comments Pt tolerates ambulation in [facility] with RW , CG assist at this time. Pt on 3L O2 at this time, O2 sats between 91-94% .    Balance Static Sitting Good   Dynamic Sitting Fair +   Static Standing Fair   Dynamic Standing Fair -   Ambulatory Fair -   Endurance Deficit   Endurance Deficit Yes   Activity Tolerance   Activity Tolerance Patient tolerated treatment well   Medical Staff Made Aware SARWAT [place]   Nurse Made Aware RN cleared pt   Assessment   Prognosis Good   Problem List Decreased endurance;Decreased mobility;Pain   Assessment Pt is a 48 y.o. female seen for PT evaluation s/p admit to [address] [place] [address] on 10/18/2023. Pt was admitted with a primary dx of:Laparoscopic colostomy closure on 10/18. PMH sx for Sigmoid diverticulitis, HTN, Large Bowel obstruction . PT now consulted for assessment of mobility and d/c needs. Pt with Ambulate orders. Pts current clinical presentation is Unstable/ Unpredictable (high complexity) due to Ongoing medical management for primary dx, Decreased activity tolerance compared to baseline, Fall risk, Increased assistance needed from caregiver at current time, Increased O2 via NC from pts baseline, s/p surgical intervention. Prior to admission, pt was Ind for mobility and ADLs pta,with supportive fiance. Upon evaluation, pt currently is requiring supervision for bed mobility with increased time to complete task 2* pain, then able to transfer and ambulate with CG A X 1 using RW . Pt currently on supplemental O2 as detailed above. Pt presents at PT eval functioning currently w/ overall mobility deficits 2* to: pain, decreased activity tolerance compared to baseline, decreased functional mobility tolerance compared to baseline, fall risk. Pt currently at a fall risk 2* to impairments listed above. Pt will continue to benefit from skilled acute PT interventions  to maximize functional mobility and DME needs. At conclusion of PT session pt returned back in chair, all needs in reach, and RN notified of session findings/recommendations with phone and call bell within reach.  Pt denies any further questions at this time. The patient's AM-PAC Basic Mobility Inpatient Short Form Raw Score is 18. A Raw score of greater than 16 suggests the patient may benefit from discharge to home. Please also refer to the recommendation of the Physical Therapist for safe discharge planning. Recommend Home, no PT services  upon hospital D/C, with family support. Barriers to Discharge None   Goals   Patient Goals to walk   STG Expiration Date 11/02/23   Short Term Goal #1 STG 1. Pt will be able to perform bed mobility tasks with Mod I in order to improve overall functional mobility and assist in safe d/c. STG 2. Pt with sit EOB for at least 25 minutes at Ind level in order to strengthen abdominal musculature and assist in future transfers/ ambulation. STG 3. Pt will be able to perform functional transfer with Mod I in order to improve overall functional mobility and assist in safe d/c. STG 4. Pt will be able to ambulate at least 300 feet with least restrictive device with Supervision A in order to improve overall functional mobility and assist in safe d/c. STG 5. Pt will improve sitting/standing static/dynamic balance 1/2 grade in order to improve functional mobility and assist in safe d/c.   PT Treatment Day 0   Plan   Treatment/Interventions Functional transfer training; Therapeutic exercise; Bed mobility;Gait training;OT;Spoke to nursing; Patient/family training   PT Frequency 2-3x/wk   Discharge Recommendation   PT Discharge Recommendation No rehabilitation needs   Equipment Recommended [address] Mobility Inpatient   Turning in Flat Bed Without Bedrails 3   Lying on Back to Sitting on Edge of Flat Bed Without Bedrails 3   Moving Bed to Chair 3   Standing Up From Chair Using Arms 3   Walk in Room 3   Climb 3-5 Stairs With Railing 3   Basic Mobility Inpatient Raw Score 18   Basic Mobility Standardized Score 41.05   Highest Level Of Mobility   JH-HLM Goal 6: Walk 10 steps or more   JH-HLM Achieved 7: Walk 25 feet or more   Modified [place] Scale   Modified [place] Scale 2   End of Consult   Patient Position at End of Consult All needs within reach; Bedside chair         Shankar Lopez, PT DPT

## 2023-10-19 NOTE — PLAN OF CARE
Problem: PHYSICAL THERAPY ADULT  Goal: Performs mobility at highest level of function for planned discharge setting. See evaluation for individualized goals. Description: Treatment/Interventions: Functional transfer training, Therapeutic exercise, Bed mobility, Gait training, OT, Spoke to nursing, Patient/family training  Equipment Recommended: Pattie Jaramillo       See flowsheet documentation for full assessment, interventions and recommendations. Note: Prognosis: Good  Problem List: Decreased endurance, Decreased mobility, Pain  Assessment: Pt is a 48 y.o. female seen for PT evaluation s/p admit to [address] [place] [address] on 10/18/2023. Pt was admitted with a primary dx of:Laparoscopic colostomy closure on 10/18. PMH sx for Sigmoid diverticulitis, HTN, Large Bowel obstruction . PT now consulted for assessment of mobility and d/c needs. Pt with Ambulate orders. Pts current clinical presentation is Unstable/ Unpredictable (high complexity) due to Ongoing medical management for primary dx, Decreased activity tolerance compared to baseline, Fall risk, Increased assistance needed from caregiver at current time, Increased O2 via NC from pts baseline, s/p surgical intervention. Prior to admission, pt was Ind for mobility and ADLs pta,with supportive fiance. Upon evaluation, pt currently is requiring supervision for bed mobility with increased time to complete task 2* pain, then able to transfer and ambulate with CG A X 1 using RW . Pt currently on supplemental O2 as detailed above. Pt presents at PT eval functioning currently w/ overall mobility deficits 2* to: pain, decreased activity tolerance compared to baseline, decreased functional mobility tolerance compared to baseline, fall risk. Pt currently at a fall risk 2* to impairments listed above. Pt will continue to benefit from skilled acute PT interventions  to maximize functional mobility and DME needs.  At conclusion of PT session pt returned back in chair, all needs in reach, and RN notified of session findings/recommendations with phone and call bell within reach. Pt denies any further questions at this time. The patient's AM-PAC Basic Mobility Inpatient Short Form Raw Score is 18. A Raw score of greater than 16 suggests the patient may benefit from discharge to home. Please also refer to the recommendation of the Physical Therapist for safe discharge planning. Recommend Home, no PT services  upon hospital D/C, with family support. Barriers to Discharge: None     PT Discharge Recommendation: No rehabilitation needs    See flowsheet documentation for full assessment.

## 2023-10-19 NOTE — PLAN OF CARE
Problem: PAIN - ADULT  Goal: Verbalizes/displays adequate comfort level or baseline comfort level  Description: Interventions:  - Encourage patient to monitor pain and request assistance  - Assess pain using appropriate pain scale  - Administer analgesics based on type and severity of pain and evaluate response  - Implement non-pharmacological measures as appropriate and evaluate response  - Consider cultural and social influences on pain and pain management  - Notify physician/advanced practitioner if interventions unsuccessful or patient reports new pain  Outcome: Progressing     Problem: INFECTION - ADULT  Goal: Absence or prevention of progression during hospitalization  Description: INTERVENTIONS:  - Assess and monitor for signs and symptoms of infection  - Monitor lab/diagnostic results  - Monitor all insertion sites, i.e. indwelling lines, tubes, and drains  - Monitor endotracheal if appropriate and nasal secretions for changes in amount and color  - [place] appropriate cooling/warming therapies per order  - Administer medications as ordered  - Instruct and encourage patient and family to use good hand hygiene technique  - Identify and instruct in appropriate isolation precautions for identified infection/condition  Outcome: Progressing  Goal: Absence of fever/infection during neutropenic period  Description: INTERVENTIONS:  - Monitor WBC    Outcome: Progressing     Problem: SAFETY ADULT  Goal: Patient will remain free of falls  Description: INTERVENTIONS:  - Educate patient/family on patient safety including physical limitations  - Instruct patient to call for assistance with activity   - Consult OT/PT to assist with strengthening/mobility   - Keep Call bell within reach  - Keep bed low and locked with side rails adjusted as appropriate  - Keep care items and personal belongings within reach  - Initiate and maintain comfort rounds  - Make Fall Risk Sign visible to staff  - Offer Toileting every 2 Hours, in advance of need  - Initiate/Maintain bed alarm  - Apply yellow socks and bracelet for high fall risk patients  - Consider moving patient to room near nurses station  Outcome: Progressing  Goal: Maintain or return to baseline ADL function  Description: INTERVENTIONS:  -  Assess patient's ability to carry out ADLs; assess patient's baseline for ADL function and identify physical deficits which impact ability to perform ADLs (bathing, care of mouth/teeth, toileting, grooming, dressing, etc.)  - Assess/evaluate cause of self-care deficits   - Assess range of motion  - Assess patient's mobility; develop plan if impaired  - Assess patient's need for assistive devices and provide as appropriate  - Encourage maximum independence but intervene and supervise when necessary  - Involve family in performance of ADLs  - Assess for home care needs following discharge   - Consider OT consult to assist with ADL evaluation and planning for discharge  - Provide patient education as appropriate  Outcome: Progressing  Goal: Maintains/Returns to pre admission functional level  Description: INTERVENTIONS:  - Perform BMAT or MOVE assessment daily.   - Set and communicate daily mobility goal to care team and patient/family/caregiver. - Collaborate with rehabilitation services on mobility goals if consulted  - Perform Range of Motion 3 times a day. - Reposition patient every 2 hours.   - Stand patient 3 times a day  - Ambulate patient 3 times a day  - Out of bed to chair 3 times a day   - Out of bed for meals 3 times a day  - Out of bed for toileting  - Record patient progress and toleration of activity level   Outcome: Progressing     Problem: DISCHARGE PLANNING  Goal: Discharge to home or other facility with appropriate resources  Description: INTERVENTIONS:  - Identify barriers to discharge w/patient and caregiver  - Arrange for needed discharge resources and transportation as appropriate  - Identify discharge learning needs (meds, wound care, etc.)  - Arrange for interpretive services to assist at discharge as needed  - Refer to Case Management Department for coordinating discharge planning if the patient needs post-hospital services based on physician/advanced practitioner order or complex needs related to functional status, cognitive ability, or social support system  Outcome: Progressing     Problem: Knowledge Deficit  Goal: Patient/family/caregiver demonstrates understanding of disease process, treatment plan, medications, and discharge instructions  Description: Complete learning assessment and assess knowledge base.   Interventions:  - Provide teaching at level of understanding  - Provide teaching via preferred learning methods  Outcome: Progressing

## 2023-10-20 LAB
BASOPHILS # BLD AUTO: 0.06 THOUSANDS/ÂΜL (ref 0–0.1)
BASOPHILS NFR BLD AUTO: 1 % (ref 0–1)
DME PARACHUTE DELIVERY DATE REQUESTED: NORMAL
DME PARACHUTE ITEM DESCRIPTION: NORMAL
DME PARACHUTE ORDER STATUS: NORMAL
DME PARACHUTE SUPPLIER NAME: NORMAL
DME PARACHUTE SUPPLIER PHONE: NORMAL
EOSINOPHIL # BLD AUTO: 0.11 THOUSAND/ÂΜL (ref 0–0.61)
EOSINOPHIL NFR BLD AUTO: 1 % (ref 0–6)
ERYTHROCYTE [DISTWIDTH] IN BLOOD BY AUTOMATED COUNT: 14.2 % (ref 11.6–15.1)
HCT VFR BLD AUTO: 40.4 % (ref 34.8–46.1)
HGB BLD-MCNC: 12.4 G/DL (ref 11.5–15.4)
IMM GRANULOCYTES # BLD AUTO: 0.05 THOUSAND/UL (ref 0–0.2)
IMM GRANULOCYTES NFR BLD AUTO: 1 % (ref 0–2)
LYMPHOCYTES # BLD AUTO: 1.14 THOUSANDS/ÂΜL (ref 0.6–4.47)
LYMPHOCYTES NFR BLD AUTO: 11 % (ref 14–44)
MCH RBC QN AUTO: 27.4 PG (ref 26.8–34.3)
MCHC RBC AUTO-ENTMCNC: 30.7 G/DL (ref 31.4–37.4)
MCV RBC AUTO: 89 FL (ref 82–98)
MONOCYTES # BLD AUTO: 0.96 THOUSAND/ÂΜL (ref 0.17–1.22)
MONOCYTES NFR BLD AUTO: 9 % (ref 4–12)
NEUTROPHILS # BLD AUTO: 8.34 THOUSANDS/ÂΜL (ref 1.85–7.62)
NEUTS SEG NFR BLD AUTO: 77 % (ref 43–75)
NRBC BLD AUTO-RTO: 0 /100 WBCS
PLATELET # BLD AUTO: 210 THOUSANDS/UL (ref 149–390)
PMV BLD AUTO: 11.3 FL (ref 8.9–12.7)
RBC # BLD AUTO: 4.52 MILLION/UL (ref 3.81–5.12)
WBC # BLD AUTO: 10.66 THOUSAND/UL (ref 4.31–10.16)

## 2023-10-20 PROCEDURE — 85025 COMPLETE CBC W/AUTO DIFF WBC: CPT | Performed by: PHYSICIAN ASSISTANT

## 2023-10-20 PROCEDURE — 99024 POSTOP FOLLOW-UP VISIT: CPT | Performed by: COLON & RECTAL SURGERY

## 2023-10-20 RX ORDER — ONDANSETRON 4 MG/1
4 TABLET, ORALLY DISINTEGRATING ORAL EVERY 4 HOURS PRN
Status: DISCONTINUED | OUTPATIENT
Start: 2023-10-20 | End: 2023-10-22 | Stop reason: HOSPADM

## 2023-10-20 RX ADMIN — AMLODIPINE BESYLATE 10 MG: 10 TABLET ORAL at 08:44

## 2023-10-20 RX ADMIN — GABAPENTIN 100 MG: 100 CAPSULE ORAL at 08:43

## 2023-10-20 RX ADMIN — GABAPENTIN 100 MG: 100 CAPSULE ORAL at 21:40

## 2023-10-20 RX ADMIN — METHOCARBAMOL 500 MG: 500 TABLET ORAL at 21:39

## 2023-10-20 RX ADMIN — ACETAMINOPHEN 975 MG: 325 TABLET, FILM COATED ORAL at 21:39

## 2023-10-20 RX ADMIN — METHOCARBAMOL 500 MG: 500 TABLET ORAL at 05:18

## 2023-10-20 RX ADMIN — ACETAMINOPHEN 975 MG: 325 TABLET, FILM COATED ORAL at 05:18

## 2023-10-20 RX ADMIN — GABAPENTIN 100 MG: 100 CAPSULE ORAL at 15:54

## 2023-10-20 RX ADMIN — METHOCARBAMOL 500 MG: 500 TABLET ORAL at 15:54

## 2023-10-20 RX ADMIN — OXYCODONE HYDROCHLORIDE 10 MG: 10 TABLET ORAL at 08:43

## 2023-10-20 RX ADMIN — ACETAMINOPHEN 975 MG: 325 TABLET, FILM COATED ORAL at 15:57

## 2023-10-20 NOTE — PROGRESS NOTES
Progress Note - Colorectal Surgery   Aaron Murray 48 y.o. female MRN: [id number]  Unit/Bed#: [facility] 834-01 Encounter: [id number]    Assessment:  59-year-old female s/p laparoscopic colostomy closure on 10/18    Afebrile, VSS, satting 96% on 3L NC  UOP 1.7L  CBC pending  Plan:  - CLD; advance as tolerated  - Awaiting return of bowel function  - Pain and nausea control as needed  - IVF with Isolyte  - Encourage OOB and I-S use  - DVT PPx    Subjective/Objective   Subjective: Patient seen and examined. NAEO. Pain well controlled at this time. Denies fever/chills, chest pain, shortness of breath. She is tolerating sips of clears. No N/V. Denies bowel function. Objective:     Blood pressure 131/73, pulse 88, temperature 99.4 °F (37.4 °C), resp. rate 16, height 4' 9" (1.448 m), weight 81.2 kg (179 lb), SpO2 96 %. ,Body mass index is 38.74 kg/m². Intake/Output Summary (Last 24 hours) at 10/20/2023 5579  Last data filed at 10/20/2023 0300  Gross per 24 hour   Intake 1699.21 ml   Output 1725 ml   Net -25.79 ml         Invasive Devices       None                   Physical Exam:   General - no acute distress, responsive and cooperative  CV - warm, regular rate  Pulm - normal work of breathing, no respiratory distress  Abd - soft, nondistended, appropriately tender. Surgical incision C/D/I and well approximated. Dressing in place without strikethrough.   Neuro - m/s grossly intact, cn grossly intact  Ext - moving all extremities

## 2023-10-20 NOTE — CASE MANAGEMENT
Case Management Discharge Planning Note    Patient name Nir Akers  [facility] [id number]  : 1970 Date 10/20/2023       Current Admission Date: 10/18/2023  Current Admission Diagnosis:Intestinal obstruction, unspecified cause, unspecified whether partial or complete [facility])   Patient Active Problem List    Diagnosis Date Noted    Large bowel obstruction ([address]) 2023    Pain of upper abdomen 2022    Essential hypertension 2020    Sigmoid diverticulitis 2018      LOS (days): 2  Geometric Mean LOS (GMLOS) (days):   Days to GMLOS:     OBJECTIVE:  Risk of Unplanned Readmission Score: 6.83         Current admission status: Inpatient   Preferred Pharmacy:   [facility] ([facility]) Carol Brine, [address] [place] [address] [address] [place] [address]  Phone: [phone] Fax: [phone]    [address] [place] [address] ROAD  [address] [place] [address]  Phone: [phone] Fax: [address] [place], [address] [place] [address] [address] [address]  Phone: [phone] Fax: [phone]    Primary Care Provider: Cathi Lyn DO    Primary Insurance: BLUE CROSS  Secondary Insurance:     DISCHARGE DETAILS:                                     DME Referral Provided  Referral made for DME?: Yes  DME referral completed for the following items[de-identified] Jorgito Mcrae  DME Supplier Name[de-identified] [employer]             Additional Comments: walker requested and delivered through [employer]. Delivery ticket uploaded.

## 2023-10-20 NOTE — NURSING NOTE
Patient without IV access since before coming on shift. Three RN's attempted access before reaching out to STAT RN to obtain ultrasound guided access. Reached out x3 throughout shift and was told each time by STAT RN that they were busy but would be up as soon as possible. Patient now over 12 hours without IV access. [facility] notified.

## 2023-10-20 NOTE — PLAN OF CARE
Problem: PAIN - ADULT  Goal: Verbalizes/displays adequate comfort level or baseline comfort level  Description: Interventions:  - Encourage patient to monitor pain and request assistance  - Assess pain using appropriate pain scale  - Administer analgesics based on type and severity of pain and evaluate response  - Implement non-pharmacological measures as appropriate and evaluate response  - Consider cultural and social influences on pain and pain management  - Notify physician/advanced practitioner if interventions unsuccessful or patient reports new pain  Outcome: Progressing     Problem: INFECTION - ADULT  Goal: Absence or prevention of progression during hospitalization  Description: INTERVENTIONS:  - Assess and monitor for signs and symptoms of infection  - Monitor lab/diagnostic results  - Monitor all insertion sites, i.e. indwelling lines, tubes, and drains  - Monitor endotracheal if appropriate and nasal secretions for changes in amount and color  - [place] appropriate cooling/warming therapies per order  - Administer medications as ordered  - Instruct and encourage patient and family to use good hand hygiene technique  - Identify and instruct in appropriate isolation precautions for identified infection/condition  Outcome: Progressing  Goal: Absence of fever/infection during neutropenic period  Description: INTERVENTIONS:  - Monitor WBC    Outcome: Progressing     Problem: SAFETY ADULT  Goal: Patient will remain free of falls  Description: INTERVENTIONS:  - Educate patient/family on patient safety including physical limitations  - Instruct patient to call for assistance with activity   - Consult OT/PT to assist with strengthening/mobility   - Keep Call bell within reach  - Keep bed low and locked with side rails adjusted as appropriate  - Keep care items and personal belongings within reach  - Initiate and maintain comfort rounds  - Make Fall Risk Sign visible to staff  - Offer Toileting every 2 Hours, in advance of need  - Initiate/Maintain bed alarm  - Apply yellow socks and bracelet for high fall risk patients  - Consider moving patient to room near nurses station  Outcome: Progressing  Goal: Maintain or return to baseline ADL function  Description: INTERVENTIONS:  -  Assess patient's ability to carry out ADLs; assess patient's baseline for ADL function and identify physical deficits which impact ability to perform ADLs (bathing, care of mouth/teeth, toileting, grooming, dressing, etc.)  - Assess/evaluate cause of self-care deficits   - Assess range of motion  - Assess patient's mobility; develop plan if impaired  - Assess patient's need for assistive devices and provide as appropriate  - Encourage maximum independence but intervene and supervise when necessary  - Involve family in performance of ADLs  - Assess for home care needs following discharge   - Consider OT consult to assist with ADL evaluation and planning for discharge  - Provide patient education as appropriate  Outcome: Progressing  Goal: Maintains/Returns to pre admission functional level  Description: INTERVENTIONS:  - Perform BMAT or MOVE assessment daily.   - Set and communicate daily mobility goal to care team and patient/family/caregiver. - Collaborate with rehabilitation services on mobility goals if consulted  - Perform Range of Motion 3 times a day.   - Stand patient 3 times a day  - Ambulate patient 3 times a day  - Out of bed to chair 3 times a day   - Out of bed for meals 3 times a day  - Out of bed for toileting  - Record patient progress and toleration of activity level   Outcome: Progressing     Problem: DISCHARGE PLANNING  Goal: Discharge to home or other facility with appropriate resources  Description: INTERVENTIONS:  - Identify barriers to discharge w/patient and caregiver  - Arrange for needed discharge resources and transportation as appropriate  - Identify discharge learning needs (meds, wound care, etc.)  - Arrange for interpretive services to assist at discharge as needed  - Refer to Case Management Department for coordinating discharge planning if the patient needs post-hospital services based on physician/advanced practitioner order or complex needs related to functional status, cognitive ability, or social support system  Outcome: Progressing     Problem: Knowledge Deficit  Goal: Patient/family/caregiver demonstrates understanding of disease process, treatment plan, medications, and discharge instructions  Description: Complete learning assessment and assess knowledge base. Interventions:  - Provide teaching at level of understanding  - Provide teaching via preferred learning methods  Outcome: Progressing     Problem: MOBILITY - ADULT  Goal: Maintain or return to baseline ADL function  Description: INTERVENTIONS:  -  Assess patient's ability to carry out ADLs; assess patient's baseline for ADL function and identify physical deficits which impact ability to perform ADLs (bathing, care of mouth/teeth, toileting, grooming, dressing, etc.)  - Assess/evaluate cause of self-care deficits   - Assess range of motion  - Assess patient's mobility; develop plan if impaired  - Assess patient's need for assistive devices and provide as appropriate  - Encourage maximum independence but intervene and supervise when necessary  - Involve family in performance of ADLs  - Assess for home care needs following discharge   - Consider OT consult to assist with ADL evaluation and planning for discharge  - Provide patient education as appropriate  Outcome: Progressing  Goal: Maintains/Returns to pre admission functional level  Description: INTERVENTIONS:  - Perform BMAT or MOVE assessment daily.   - Set and communicate daily mobility goal to care team and patient/family/caregiver. - Collaborate with rehabilitation services on mobility goals if consulted  - Perform Range of Motion 3 times a day.   - Stand patient 3 times a day  - Ambulate patient 3 times a day  - Out of bed to chair 3 times a day   - Out of bed for meals 3 times a day  - Out of bed for toileting  - Record patient progress and toleration of activity level   Outcome: Progressing     Problem: Nutrition/Hydration-ADULT  Goal: Nutrient/Hydration intake appropriate for improving, restoring or maintaining nutritional needs  Description: Monitor and assess patient's nutrition/hydration status for malnutrition. Collaborate with interdisciplinary team and initiate plan and interventions as ordered. Monitor patient's weight and dietary intake as ordered or per policy. Utilize nutrition screening tool and intervene as necessary. Determine patient's food preferences and provide high-protein, high-caloric foods as appropriate.      INTERVENTIONS:  - Monitor oral intake, urinary output, labs, and treatment plans  - Assess nutrition and hydration status and recommend course of action  - Evaluate amount of meals eaten  - Assist patient with eating if necessary   - Allow adequate time for meals  - Recommend/ encourage appropriate diets, oral nutritional supplements, and vitamin/mineral supplements  - Order, calculate, and assess calorie counts as needed  - Recommend, monitor, and adjust tube feedings and TPN/PPN based on assessed needs  - Assess need for intravenous fluids  - Provide specific nutrition/hydration education as appropriate  - Include patient/family/caregiver in decisions related to nutrition  Outcome: Progressing

## 2023-10-21 ENCOUNTER — APPOINTMENT (INPATIENT)
Dept: RADIOLOGY | Facility: HOSPITAL | Age: 53
DRG: 331 | End: 2023-10-21
Payer: COMMERCIAL

## 2023-10-21 LAB
ANION GAP SERPL CALCULATED.3IONS-SCNC: 6 MMOL/L
BACTERIA UR QL AUTO: ABNORMAL /HPF
BASOPHILS # BLD AUTO: 0.04 THOUSANDS/ÂΜL (ref 0–0.1)
BASOPHILS NFR BLD AUTO: 0 % (ref 0–1)
BILIRUB UR QL STRIP: NEGATIVE
BUN SERPL-MCNC: 6 MG/DL (ref 5–25)
CALCIUM SERPL-MCNC: 8.3 MG/DL (ref 8.4–10.2)
CHLORIDE SERPL-SCNC: 103 MMOL/L (ref 96–108)
CLARITY UR: CLEAR
CO2 SERPL-SCNC: 28 MMOL/L (ref 21–32)
COLOR UR: YELLOW
CREAT SERPL-MCNC: 0.49 MG/DL (ref 0.6–1.3)
EOSINOPHIL # BLD AUTO: 0.19 THOUSAND/ÂΜL (ref 0–0.61)
EOSINOPHIL NFR BLD AUTO: 2 % (ref 0–6)
ERYTHROCYTE [DISTWIDTH] IN BLOOD BY AUTOMATED COUNT: 13.8 % (ref 11.6–15.1)
GFR SERPL CREATININE-BSD FRML MDRD: 111 ML/MIN/1.73SQ M
GLUCOSE SERPL-MCNC: 85 MG/DL (ref 65–140)
GLUCOSE UR STRIP-MCNC: NEGATIVE MG/DL
HCT VFR BLD AUTO: 41.5 % (ref 34.8–46.1)
HGB BLD-MCNC: 13 G/DL (ref 11.5–15.4)
HGB UR QL STRIP.AUTO: ABNORMAL
IMM GRANULOCYTES # BLD AUTO: 0.03 THOUSAND/UL (ref 0–0.2)
IMM GRANULOCYTES NFR BLD AUTO: 0 % (ref 0–2)
KETONES UR STRIP-MCNC: ABNORMAL MG/DL
LEUKOCYTE ESTERASE UR QL STRIP: NEGATIVE
LYMPHOCYTES # BLD AUTO: 1.27 THOUSANDS/ÂΜL (ref 0.6–4.47)
LYMPHOCYTES NFR BLD AUTO: 13 % (ref 14–44)
MCH RBC QN AUTO: 28.2 PG (ref 26.8–34.3)
MCHC RBC AUTO-ENTMCNC: 31.3 G/DL (ref 31.4–37.4)
MCV RBC AUTO: 90 FL (ref 82–98)
MONOCYTES # BLD AUTO: 0.82 THOUSAND/ÂΜL (ref 0.17–1.22)
MONOCYTES NFR BLD AUTO: 9 % (ref 4–12)
MUCOUS THREADS UR QL AUTO: ABNORMAL
NEUTROPHILS # BLD AUTO: 7.33 THOUSANDS/ÂΜL (ref 1.85–7.62)
NEUTS SEG NFR BLD AUTO: 76 % (ref 43–75)
NITRITE UR QL STRIP: NEGATIVE
NON-SQ EPI CELLS URNS QL MICRO: ABNORMAL /HPF
NRBC BLD AUTO-RTO: 0 /100 WBCS
PH UR STRIP.AUTO: 6.5 [PH]
PLATELET # BLD AUTO: 228 THOUSANDS/UL (ref 149–390)
PMV BLD AUTO: 10.7 FL (ref 8.9–12.7)
POTASSIUM SERPL-SCNC: 3.6 MMOL/L (ref 3.5–5.3)
PROT UR STRIP-MCNC: ABNORMAL MG/DL
RBC # BLD AUTO: 4.61 MILLION/UL (ref 3.81–5.12)
RBC #/AREA URNS AUTO: ABNORMAL /HPF
SODIUM SERPL-SCNC: 137 MMOL/L (ref 135–147)
SP GR UR STRIP.AUTO: 1.02 (ref 1–1.03)
UROBILINOGEN UR STRIP-ACNC: <2 MG/DL
WBC # BLD AUTO: 9.68 THOUSAND/UL (ref 4.31–10.16)
WBC #/AREA URNS AUTO: ABNORMAL /HPF

## 2023-10-21 PROCEDURE — 71045 X-RAY EXAM CHEST 1 VIEW: CPT

## 2023-10-21 PROCEDURE — 80048 BASIC METABOLIC PNL TOTAL CA: CPT | Performed by: PHYSICIAN ASSISTANT

## 2023-10-21 PROCEDURE — 99024 POSTOP FOLLOW-UP VISIT: CPT | Performed by: COLON & RECTAL SURGERY

## 2023-10-21 PROCEDURE — 81001 URINALYSIS AUTO W/SCOPE: CPT

## 2023-10-21 PROCEDURE — 85025 COMPLETE CBC W/AUTO DIFF WBC: CPT | Performed by: PHYSICIAN ASSISTANT

## 2023-10-21 RX ORDER — POTASSIUM CHLORIDE 20 MEQ/1
40 TABLET, EXTENDED RELEASE ORAL ONCE
Status: COMPLETED | OUTPATIENT
Start: 2023-10-21 | End: 2023-10-21

## 2023-10-21 RX ADMIN — ACETAMINOPHEN 975 MG: 325 TABLET, FILM COATED ORAL at 06:25

## 2023-10-21 RX ADMIN — METHOCARBAMOL 500 MG: 500 TABLET ORAL at 21:30

## 2023-10-21 RX ADMIN — ACETAMINOPHEN 975 MG: 325 TABLET, FILM COATED ORAL at 21:30

## 2023-10-21 RX ADMIN — METHOCARBAMOL 500 MG: 500 TABLET ORAL at 14:09

## 2023-10-21 RX ADMIN — ACETAMINOPHEN 975 MG: 325 TABLET, FILM COATED ORAL at 14:08

## 2023-10-21 RX ADMIN — GABAPENTIN 100 MG: 100 CAPSULE ORAL at 17:57

## 2023-10-21 RX ADMIN — POTASSIUM CHLORIDE 40 MEQ: 1500 TABLET, EXTENDED RELEASE ORAL at 10:28

## 2023-10-21 RX ADMIN — GABAPENTIN 100 MG: 100 CAPSULE ORAL at 10:28

## 2023-10-21 RX ADMIN — AMLODIPINE BESYLATE 10 MG: 10 TABLET ORAL at 10:28

## 2023-10-21 RX ADMIN — GABAPENTIN 100 MG: 100 CAPSULE ORAL at 21:30

## 2023-10-21 RX ADMIN — OXYCODONE HYDROCHLORIDE 5 MG: 5 TABLET ORAL at 02:56

## 2023-10-21 RX ADMIN — METHOCARBAMOL 500 MG: 500 TABLET ORAL at 06:25

## 2023-10-21 NOTE — PROGRESS NOTES
Progress Note - Colorectal Surgery   Rob Fraser 48 y.o. female MRN: [id number]  Unit/Bed#: [facility] 834-01 Encounter: [id number]    Assessment:  48year old female s/p laparoscopic colostomy closure on 10/18     Plan:  - Continue low fiber diet  - Continue to monitor for bowel function  - F/u CXR, UA  - Wean oxygen as tolerated, monitor saturations  - Pain and nausea control PRN  - Encourage IS, ambulation   - DVT ppx     Subjective:  No acute events overnight. Pain is controlled. Patient denies nausea, vomiting, chills, shortness of breath. Tolerating p.o. diet. Ambulated yesterday. No flatus or bowel movements. Voiding. Objective:    Vitals:   Febrile with Tmax of 101.2 at 4:30 PM yesterday, 100.7 at 10:30 PM.   Desaturations recorded down to 82% on room air. Patient replaced on 2 L nasal cannula. Temp:  [98.7 °F (37.1 °C)-101.2 °F (38.4 °C)] 99.4 °F (37.4 °C)  HR:  [] 89  Resp:  [16-18] 16  BP: (117-123)/(70-75) 123/75  Body mass index is 38.74 kg/m². Physical Exam:   Gen: NAD, Resting in bed  Neuro: A&O, No focal deficits  Head: Normal Cephalic, Atraumatic  Eye: EOMI, No scleral icterus  CV: Regular rate  Pulm: Normal work of breathing, No respiratory distress on RA  Abd: Soft, Non-Distended, appropriately tender. Incisions are clean dry and intact. Ext: No edema bilateral lower extremities, Non-tender  Skin: Warm, Dry, Intact    I/O:  U.O: 1.65 L.     Intake/Output Summary (Last 24 hours) at 10/21/2023 0754  Last data filed at 10/20/2023 2112  Gross per 24 hour   Intake 120 ml   Output 1650 ml   Net -1530 ml       Lab Results:  Recent Labs     10/18/23  1548 10/19/23  0510 10/20/23  0627 10/21/23  0625   WBC 15.97* 13.81* 10.66* 9.68   HGB 14.9 13.7 12.4 13.0    233 210 228   SODIUM 138 138  --  137   K 4.3 4.1  --  3.6   * 106  --  103   CO2 24 26  --  28   BUN 9 8  --  6   CREATININE 0.61 0.44*  --  0.49*   GLUC 103 102  --  85   CALCIUM 8.8 8.5  --  8.3*   MG 1.7* 2.0 --   --    PHOS 4.0 4.3  --   --        ---    Marquis Restrepo MD  General Surgery PGY-I

## 2023-10-21 NOTE — PLAN OF CARE
Problem: PAIN - ADULT  Goal: Verbalizes/displays adequate comfort level or baseline comfort level  Description: Interventions:  - Encourage patient to monitor pain and request assistance  - Assess pain using appropriate pain scale  - Administer analgesics based on type and severity of pain and evaluate response  - Implement non-pharmacological measures as appropriate and evaluate response  - Consider cultural and social influences on pain and pain management  - Notify physician/advanced practitioner if interventions unsuccessful or patient reports new pain  Outcome: Progressing     Problem: INFECTION - ADULT  Goal: Absence or prevention of progression during hospitalization  Description: INTERVENTIONS:  - Assess and monitor for signs and symptoms of infection  - Monitor lab/diagnostic results  - Monitor all insertion sites, i.e. indwelling lines, tubes, and drains  - Monitor endotracheal if appropriate and nasal secretions for changes in amount and color  - [place] appropriate cooling/warming therapies per order  - Administer medications as ordered  - Instruct and encourage patient and family to use good hand hygiene technique  - Identify and instruct in appropriate isolation precautions for identified infection/condition  Outcome: Progressing  Goal: Absence of fever/infection during neutropenic period  Description: INTERVENTIONS:  - Monitor WBC    Outcome: Progressing     Problem: DISCHARGE PLANNING  Goal: Discharge to home or other facility with appropriate resources  Description: INTERVENTIONS:  - Identify barriers to discharge w/patient and caregiver  - Arrange for needed discharge resources and transportation as appropriate  - Identify discharge learning needs (meds, wound care, etc.)  - Arrange for interpretive services to assist at discharge as needed  - Refer to Case Management Department for coordinating discharge planning if the patient needs post-hospital services based on physician/advanced practitioner order or complex needs related to functional status, cognitive ability, or social support system  Outcome: Progressing     Problem: Knowledge Deficit  Goal: Patient/family/caregiver demonstrates understanding of disease process, treatment plan, medications, and discharge instructions  Description: Complete learning assessment and assess knowledge base. Interventions:  - Provide teaching at level of understanding  - Provide teaching via preferred learning methods  Outcome: Progressing     Problem: Nutrition/Hydration-ADULT  Goal: Nutrient/Hydration intake appropriate for improving, restoring or maintaining nutritional needs  Description: Monitor and assess patient's nutrition/hydration status for malnutrition. Collaborate with interdisciplinary team and initiate plan and interventions as ordered. Monitor patient's weight and dietary intake as ordered or per policy. Utilize nutrition screening tool and intervene as necessary. Determine patient's food preferences and provide high-protein, high-caloric foods as appropriate.      INTERVENTIONS:  - Monitor oral intake, urinary output, labs, and treatment plans  - Assess nutrition and hydration status and recommend course of action  - Evaluate amount of meals eaten  - Assist patient with eating if necessary   - Allow adequate time for meals  - Recommend/ encourage appropriate diets, oral nutritional supplements, and vitamin/mineral supplements  - Order, calculate, and assess calorie counts as needed  - Recommend, monitor, and adjust tube feedings and TPN/PPN based on assessed needs  - Assess need for intravenous fluids  - Provide specific nutrition/hydration education as appropriate  - Include patient/family/caregiver in decisions related to nutrition  Outcome: Progressing

## 2023-10-22 VITALS
HEART RATE: 64 BPM | TEMPERATURE: 98.9 F | WEIGHT: 179 LBS | SYSTOLIC BLOOD PRESSURE: 117 MMHG | DIASTOLIC BLOOD PRESSURE: 69 MMHG | OXYGEN SATURATION: 93 % | HEIGHT: 57 IN | RESPIRATION RATE: 18 BRPM | BODY MASS INDEX: 38.62 KG/M2

## 2023-10-22 PROBLEM — K57.32 SIGMOID DIVERTICULITIS: Status: RESOLVED | Noted: 2018-05-14 | Resolved: 2023-10-22

## 2023-10-22 PROBLEM — Z98.890 HISTORY OF COLOSTOMY REVERSAL: Status: ACTIVE | Noted: 2023-10-22

## 2023-10-22 LAB
ALBUMIN SERPL BCP-MCNC: 3.5 G/DL (ref 3.5–5)
ALP SERPL-CCNC: 89 U/L (ref 34–104)
ALT SERPL W P-5'-P-CCNC: 10 U/L (ref 7–52)
ANION GAP SERPL CALCULATED.3IONS-SCNC: 7 MMOL/L
AST SERPL W P-5'-P-CCNC: 11 U/L (ref 13–39)
BASOPHILS # BLD AUTO: 0.05 THOUSANDS/ÂΜL (ref 0–0.1)
BASOPHILS NFR BLD AUTO: 1 % (ref 0–1)
BILIRUB SERPL-MCNC: 0.75 MG/DL (ref 0.2–1)
BUN SERPL-MCNC: 8 MG/DL (ref 5–25)
CALCIUM SERPL-MCNC: 8.8 MG/DL (ref 8.4–10.2)
CHLORIDE SERPL-SCNC: 102 MMOL/L (ref 96–108)
CO2 SERPL-SCNC: 28 MMOL/L (ref 21–32)
CREAT SERPL-MCNC: 0.58 MG/DL (ref 0.6–1.3)
EOSINOPHIL # BLD AUTO: 0.36 THOUSAND/ÂΜL (ref 0–0.61)
EOSINOPHIL NFR BLD AUTO: 3 % (ref 0–6)
ERYTHROCYTE [DISTWIDTH] IN BLOOD BY AUTOMATED COUNT: 13.6 % (ref 11.6–15.1)
GFR SERPL CREATININE-BSD FRML MDRD: 105 ML/MIN/1.73SQ M
GLUCOSE SERPL-MCNC: 97 MG/DL (ref 65–140)
HCT VFR BLD AUTO: 41.8 % (ref 34.8–46.1)
HGB BLD-MCNC: 13.4 G/DL (ref 11.5–15.4)
IMM GRANULOCYTES # BLD AUTO: 0.05 THOUSAND/UL (ref 0–0.2)
IMM GRANULOCYTES NFR BLD AUTO: 1 % (ref 0–2)
LYMPHOCYTES # BLD AUTO: 1.47 THOUSANDS/ÂΜL (ref 0.6–4.47)
LYMPHOCYTES NFR BLD AUTO: 14 % (ref 14–44)
MAGNESIUM SERPL-MCNC: 1.9 MG/DL (ref 1.9–2.7)
MCH RBC QN AUTO: 28.2 PG (ref 26.8–34.3)
MCHC RBC AUTO-ENTMCNC: 32.1 G/DL (ref 31.4–37.4)
MCV RBC AUTO: 88 FL (ref 82–98)
MONOCYTES # BLD AUTO: 0.88 THOUSAND/ÂΜL (ref 0.17–1.22)
MONOCYTES NFR BLD AUTO: 8 % (ref 4–12)
NEUTROPHILS # BLD AUTO: 7.86 THOUSANDS/ÂΜL (ref 1.85–7.62)
NEUTS SEG NFR BLD AUTO: 73 % (ref 43–75)
NRBC BLD AUTO-RTO: 0 /100 WBCS
PHOSPHATE SERPL-MCNC: 3.6 MG/DL (ref 2.7–4.5)
PLATELET # BLD AUTO: 300 THOUSANDS/UL (ref 149–390)
PMV BLD AUTO: 10.2 FL (ref 8.9–12.7)
POTASSIUM SERPL-SCNC: 3.8 MMOL/L (ref 3.5–5.3)
PROT SERPL-MCNC: 7.2 G/DL (ref 6.4–8.4)
RBC # BLD AUTO: 4.75 MILLION/UL (ref 3.81–5.12)
SODIUM SERPL-SCNC: 137 MMOL/L (ref 135–147)
WBC # BLD AUTO: 10.67 THOUSAND/UL (ref 4.31–10.16)

## 2023-10-22 PROCEDURE — 99024 POSTOP FOLLOW-UP VISIT: CPT | Performed by: COLON & RECTAL SURGERY

## 2023-10-22 PROCEDURE — 84100 ASSAY OF PHOSPHORUS: CPT

## 2023-10-22 PROCEDURE — 85025 COMPLETE CBC W/AUTO DIFF WBC: CPT

## 2023-10-22 PROCEDURE — 80053 COMPREHEN METABOLIC PANEL: CPT

## 2023-10-22 PROCEDURE — 83735 ASSAY OF MAGNESIUM: CPT

## 2023-10-22 RX ORDER — METHOCARBAMOL 500 MG/1
500 TABLET, FILM COATED ORAL EVERY 8 HOURS SCHEDULED
Refills: 0
Start: 2023-10-22 | End: 2023-10-23 | Stop reason: SDUPTHER

## 2023-10-22 RX ORDER — ACETAMINOPHEN 325 MG/1
975 TABLET ORAL EVERY 8 HOURS SCHEDULED
Refills: 0
Start: 2023-10-22 | End: 2023-10-23 | Stop reason: SDUPTHER

## 2023-10-22 RX ORDER — GABAPENTIN 100 MG/1
300 CAPSULE ORAL 3 TIMES DAILY
Refills: 0
Start: 2023-10-22 | End: 2023-10-23 | Stop reason: SDUPTHER

## 2023-10-22 RX ADMIN — METHOCARBAMOL 500 MG: 500 TABLET ORAL at 13:41

## 2023-10-22 RX ADMIN — METHOCARBAMOL 500 MG: 500 TABLET ORAL at 06:44

## 2023-10-22 RX ADMIN — GABAPENTIN 100 MG: 100 CAPSULE ORAL at 08:09

## 2023-10-22 RX ADMIN — ACETAMINOPHEN 975 MG: 325 TABLET, FILM COATED ORAL at 06:44

## 2023-10-22 RX ADMIN — AMLODIPINE BESYLATE 10 MG: 10 TABLET ORAL at 08:09

## 2023-10-22 RX ADMIN — ACETAMINOPHEN 975 MG: 325 TABLET, FILM COATED ORAL at 13:41

## 2023-10-22 RX ADMIN — GABAPENTIN 100 MG: 100 CAPSULE ORAL at 16:24

## 2023-10-22 RX ADMIN — OXYCODONE HYDROCHLORIDE 5 MG: 5 TABLET ORAL at 00:22

## 2023-10-22 NOTE — PROGRESS NOTES
Progress Note - Colorectal Surgery   Cyril Harry 48 y.o. female MRN: [id number]  Unit/Bed#: [facility] 834-01 Encounter: [id number]    Assessment:  48year old female s/p laparoscopic colostomy closure on 10/18     Febrile to 101.1 at 10 PM, otherwise VSS satting 93% on 2L NC,   cc (400)  A.m. labs pending  CXR UA negative for acute process  Plan:  -Tolerating low fiber diet; now having bowel function  - Wean oxygen as tolerated, monitor saturations  - Pain and nausea control PRN  - Encourage IS, ambulation   - DVT ppx     Subjective:  Febrile to 101.1 last night. Patient with no complaints. She denies nausea, vomiting, chills, shortness of breath. Tolerating p.o. diet. Ambulated yesterday. Having flatus denies BM. Voiding. Objective:    Vitals:   Febrile with Tmax of 101.2 at 4:30 PM yesterday, 100.7 at 10:30 PM.   Desaturations recorded down to 82% on room air. Patient replaced on 2 L nasal cannula. Temp:  [99.4 °F (37.4 °C)-101.1 °F (38.4 °C)] 101.1 °F (38.4 °C)  HR:  [86-90] 86  Resp:  [16-18] 18  BP: (122-128)/(73-82) 128/82  Body mass index is 38.74 kg/m². Physical Exam:   Gen: NAD, Resting in bed  Neuro: A&O, No focal deficits  Head: Normal Cephalic, Atraumatic  Eye: EOMI, No scleral icterus  CV: Regular rate  Pulm: Normal work of breathing, No respiratory distress on RA  Abd: Soft, Non-Distended, appropriately tender. Incisions are clean dry and intact. Ext: No edema bilateral lower extremities, Non-tender  Skin: Warm, Dry, Intact    I/O:  U.O: 1.65 L.     Intake/Output Summary (Last 24 hours) at 10/22/2023 0704  Last data filed at 10/22/2023 0109  Gross per 24 hour   Intake --   Output 700 ml   Net -700 ml         Lab Results:  Recent Labs     10/20/23  0627 10/21/23  0625   WBC 10.66* 9.68   HGB 12.4 13.0    228   SODIUM  --  137   K  --  3.6   CL  --  103   CO2  --  28   BUN  --  6   CREATININE  --  0.49*   GLUC  --  85   CALCIUM  --  8.3*

## 2023-10-22 NOTE — PLAN OF CARE
Problem: PAIN - ADULT  Goal: Verbalizes/displays adequate comfort level or baseline comfort level  Description: Interventions:  - Encourage patient to monitor pain and request assistance  - Assess pain using appropriate pain scale  - Administer analgesics based on type and severity of pain and evaluate response  - Implement non-pharmacological measures as appropriate and evaluate response  - Consider cultural and social influences on pain and pain management  - Notify physician/advanced practitioner if interventions unsuccessful or patient reports new pain  Outcome: Progressing     Problem: INFECTION - ADULT  Goal: Absence or prevention of progression during hospitalization  Description: INTERVENTIONS:  - Assess and monitor for signs and symptoms of infection  - Monitor lab/diagnostic results  - Monitor all insertion sites, i.e. indwelling lines, tubes, and drains  - Monitor endotracheal if appropriate and nasal secretions for changes in amount and color  - [place] appropriate cooling/warming therapies per order  - Administer medications as ordered  - Instruct and encourage patient and family to use good hand hygiene technique  - Identify and instruct in appropriate isolation precautions for identified infection/condition  Outcome: Progressing  Goal: Absence of fever/infection during neutropenic period  Description: INTERVENTIONS:  - Monitor WBC    Outcome: Progressing     Problem: DISCHARGE PLANNING  Goal: Discharge to home or other facility with appropriate resources  Description: INTERVENTIONS:  - Identify barriers to discharge w/patient and caregiver  - Arrange for needed discharge resources and transportation as appropriate  - Identify discharge learning needs (meds, wound care, etc.)  - Arrange for interpretive services to assist at discharge as needed  - Refer to Case Management Department for coordinating discharge planning if the patient needs post-hospital services based on physician/advanced practitioner order or complex needs related to functional status, cognitive ability, or social support system  Outcome: Progressing     Problem: Knowledge Deficit  Goal: Patient/family/caregiver demonstrates understanding of disease process, treatment plan, medications, and discharge instructions  Description: Complete learning assessment and assess knowledge base. Interventions:  - Provide teaching at level of understanding  - Provide teaching via preferred learning methods  Outcome: Progressing     Problem: Nutrition/Hydration-ADULT  Goal: Nutrient/Hydration intake appropriate for improving, restoring or maintaining nutritional needs  Description: Monitor and assess patient's nutrition/hydration status for malnutrition. Collaborate with interdisciplinary team and initiate plan and interventions as ordered. Monitor patient's weight and dietary intake as ordered or per policy. Utilize nutrition screening tool and intervene as necessary. Determine patient's food preferences and provide high-protein, high-caloric foods as appropriate.      INTERVENTIONS:  - Monitor oral intake, urinary output, labs, and treatment plans  - Assess nutrition and hydration status and recommend course of action  - Evaluate amount of meals eaten  - Assist patient with eating if necessary   - Allow adequate time for meals  - Recommend/ encourage appropriate diets, oral nutritional supplements, and vitamin/mineral supplements  - Order, calculate, and assess calorie counts as needed  - Recommend, monitor, and adjust tube feedings and TPN/PPN based on assessed needs  - Assess need for intravenous fluids  - Provide specific nutrition/hydration education as appropriate  - Include patient/family/caregiver in decisions related to nutrition  Outcome: Progressing

## 2023-10-22 NOTE — PLAN OF CARE
Problem: PAIN - ADULT  Goal: Verbalizes/displays adequate comfort level or baseline comfort level  Description: Interventions:  - Encourage patient to monitor pain and request assistance  - Assess pain using appropriate pain scale  - Administer analgesics based on type and severity of pain and evaluate response  - Implement non-pharmacological measures as appropriate and evaluate response  - Consider cultural and social influences on pain and pain management  - Notify physician/advanced practitioner if interventions unsuccessful or patient reports new pain  Outcome: Progressing     Problem: INFECTION - ADULT  Goal: Absence or prevention of progression during hospitalization  Description: INTERVENTIONS:  - Assess and monitor for signs and symptoms of infection  - Monitor lab/diagnostic results  - Monitor all insertion sites, i.e. indwelling lines, tubes, and drains  - Monitor endotracheal if appropriate and nasal secretions for changes in amount and color  - [place] appropriate cooling/warming therapies per order  - Administer medications as ordered  - Instruct and encourage patient and family to use good hand hygiene technique  - Identify and instruct in appropriate isolation precautions for identified infection/condition  Outcome: Progressing  Goal: Absence of fever/infection during neutropenic period  Description: INTERVENTIONS:  - Monitor WBC    Outcome: Progressing     Problem: SAFETY ADULT  Goal: Patient will remain free of falls  Description: INTERVENTIONS:  - Educate patient/family on patient safety including physical limitations  - Instruct patient to call for assistance with activity   - Consult OT/PT to assist with strengthening/mobility   - Keep Call bell within reach  - Keep bed low and locked with side rails adjusted as appropriate  - Keep care items and personal belongings within reach  - Initiate and maintain comfort rounds  - Make Fall Risk Sign visible to staff  - Offer Toileting every 2 Hours, in advance of need  - Initiate/Maintain Bed alarm  - Apply yellow socks and bracelet for high fall risk patients  - Consider moving patient to room near nurses station  Outcome: Progressing  Goal: Maintain or return to baseline ADL function  Description: INTERVENTIONS:  -  Assess patient's ability to carry out ADLs; assess patient's baseline for ADL function and identify physical deficits which impact ability to perform ADLs (bathing, care of mouth/teeth, toileting, grooming, dressing, etc.)  - Assess/evaluate cause of self-care deficits   - Assess range of motion  - Assess patient's mobility; develop plan if impaired  - Assess patient's need for assistive devices and provide as appropriate  - Encourage maximum independence but intervene and supervise when necessary  - Involve family in performance of ADLs  - Assess for home care needs following discharge   - Consider OT consult to assist with ADL evaluation and planning for discharge  - Provide patient education as appropriate  Outcome: Progressing  Goal: Maintains/Returns to pre admission functional level  Description: INTERVENTIONS:  - Perform BMAT or MOVE assessment daily.   - Set and communicate daily mobility goal to care team and patient/family/caregiver. - Collaborate with rehabilitation services on mobility goals if consulted  - Perform Range of Motion 3 times a day. - Reposition patient every 2 hours.   - Dangle patient 3 times a day  - Stand patient 3 times a day  - Ambulate patient 3 times a day  - Out of bed to chair 3 times a day   - Out of bed for meals 3 times a day  - Out of bed for toileting  - Record patient progress and toleration of activity level   Outcome: Progressing     Problem: DISCHARGE PLANNING  Goal: Discharge to home or other facility with appropriate resources  Description: INTERVENTIONS:  - Identify barriers to discharge w/patient and caregiver  - Arrange for needed discharge resources and transportation as appropriate  - Identify discharge learning needs (meds, wound care, etc.)  - Arrange for interpretive services to assist at discharge as needed  - Refer to Case Management Department for coordinating discharge planning if the patient needs post-hospital services based on physician/advanced practitioner order or complex needs related to functional status, cognitive ability, or social support system  Outcome: Progressing     Problem: Knowledge Deficit  Goal: Patient/family/caregiver demonstrates understanding of disease process, treatment plan, medications, and discharge instructions  Description: Complete learning assessment and assess knowledge base.   Interventions:  - Provide teaching at level of understanding  - Provide teaching via preferred learning methods  Outcome: Progressing

## 2023-10-23 ENCOUNTER — NURSE TRIAGE (OUTPATIENT)
Age: 53
End: 2023-10-23

## 2023-10-23 DIAGNOSIS — Z98.890 HISTORY OF COLOSTOMY REVERSAL: ICD-10-CM

## 2023-10-23 PROCEDURE — 88304 TISSUE EXAM BY PATHOLOGIST: CPT | Performed by: PATHOLOGY

## 2023-10-23 RX ORDER — ACETAMINOPHEN 325 MG/1
975 TABLET ORAL EVERY 8 HOURS SCHEDULED
Qty: 45 TABLET | Refills: 0 | Status: SHIPPED | OUTPATIENT
Start: 2023-10-23 | End: 2023-10-28

## 2023-10-23 RX ORDER — GABAPENTIN 100 MG/1
300 CAPSULE ORAL 3 TIMES DAILY
Qty: 63 CAPSULE | Refills: 0 | Status: SHIPPED | OUTPATIENT
Start: 2023-10-23 | End: 2023-10-30

## 2023-10-23 NOTE — TELEPHONE ENCOUNTER
the patient called she was inquiring if she was going to be prescribed a muscle relaxer. I advised that is not normally given as an out patient medication. We reviewed the medications that she was prescribed.

## 2023-10-23 NOTE — TELEPHONE ENCOUNTER
Patient calling refill line, prescriptions were not sent to pharmacy after hospital discharge. Please advise as patient needs these meds. Patient uses [facility] in [facility].

## 2023-10-24 RX ORDER — METHOCARBAMOL 500 MG/1
500 TABLET, FILM COATED ORAL EVERY 8 HOURS SCHEDULED
Qty: 30 TABLET | Refills: 0 | Status: SHIPPED | OUTPATIENT
Start: 2023-10-24 | End: 2023-10-31

## 2023-10-27 ENCOUNTER — NURSE TRIAGE (OUTPATIENT)
Age: 53
End: 2023-10-27

## 2023-10-27 NOTE — TELEPHONE ENCOUNTER
the patient called she noted some discharge from the surgical incision. She denies any pain. Patient advised that this is normal and to cover the are with gauze to protect her clothing.

## 2023-11-01 LAB
DME PARACHUTE DELIVERY DATE ACTUAL: NORMAL
DME PARACHUTE DELIVERY DATE REQUESTED: NORMAL
DME PARACHUTE ITEM DESCRIPTION: NORMAL
DME PARACHUTE ORDER STATUS: NORMAL
DME PARACHUTE SUPPLIER NAME: NORMAL
DME PARACHUTE SUPPLIER PHONE: NORMAL

## 2023-11-01 NOTE — PROGRESS NOTES
Diagnoses and all orders for this visit:    Large bowel obstruction ([address])       Large bowel obstruction [facility])  Patient presents for follow-up of colostomy takedown following operation for large bowel obstruction secondary to diverticular stricture. She notes she is doing well. Bowel movements are loose but improving. Incisions are healing nicely. She has small amount of drainage from the inferior incision but this is improved. Staples were removed today. Patient may start to return to normal function. She may slowly increase her exercise tolerance as well as her lifting. Diet may be liberalized. Plan will be for her to return to work in another 6 weeks or so. She will call if any concerning symptoms. Otherwise plan for follow-up flexible sigmoidoscopy in 6 to 12 months. JOSE G Pepper is a patient who is here today for a post operative evaluation, following colostomy closure on 10/18/2023. Surgical pathology:  A. Large Intestine, NOS, RECTAL STUMP:  - Portion of colon with no pathologic abnormality. B. Large Intestine, NOS, COLOSTOMY:  - Portion of colon with mucocutaneous anastomosis consistent with stoma.     Past Medical History:   Diagnosis Date    Diverticulitis     Diverticulosis     Fibroids     Hypertension      Past Surgical History:   Procedure Laterality Date    COLONOSCOPY      COLOSTOMY N/A 4/26/2023    Procedure: COLOSTOMY END;  Surgeon: Shamar Ramirez MD;  [facility];  Service: Colorectal    RI LAPAROSCOPY COLECTOMY PARTIAL W/ANASTOMOSIS N/A 4/26/2023    Procedure: LAPAROSCOPY, EXTENSIVE LYSIS ADHESIONS, MOBILIZATION SPLENIC FLEXURE, RESECTION COLON SIGMOID LAPAROSCOPIC;  Surgeon: Shamar Ramirez MD;  [facility];  Service: Colorectal    RI LAPS CLSR NTRSTM LG/SM INT W/RESCJ & ANASTOMOSIS N/A 10/18/2023    Procedure: CLOSURE COLOSTOMY, LAPAROSCOPIC;  Surgeon: Shamar Ramirez MD;  [facility];  Service: Colorectal    SMALL INTESTINE SURGERY N/A 4/26/2023    Procedure: RESECTION SMALL BOWEL  X 2;  Surgeon: Jennifer Canales MD;  [facility];  Service: Colorectal    TUBAL LIGATION  1991       Current Outpatient Medications:     amLODIPine (NORVASC) 10 mg tablet, Take 10 mg by mouth daily, Disp: , Rfl:     doxycycline (ADOXA) 50 MG tablet, Take 50 mg by mouth 2 (two) times a day, Disp: , Rfl:     gabapentin (NEURONTIN) 100 mg capsule, Take 3 capsules (300 mg total) by mouth 3 (three) times a day for 7 days, Disp: 63 capsule, Rfl: 0    hydrochlorothiazide (HYDRODIURIL) 25 mg tablet, Take 25 mg by mouth daily (Patient not taking: Reported on 11/2/2023), Disp: , Rfl:     methocarbamol (ROBAXIN) 500 mg tablet, Take 1 tablet (500 mg total) by mouth every 8 (eight) hours for 7 days, Disp: 30 tablet, Rfl: 0  Allergies as of 11/02/2023    (No Known Allergies)     Review of Systems   Constitutional: Negative. HENT: Negative. Eyes: Negative. Respiratory: Negative. Cardiovascular: Negative. Gastrointestinal: Negative. Endocrine: Negative. Genitourinary: Negative. Musculoskeletal: Negative. Skin: Negative. Allergic/Immunologic: Negative. Neurological: Negative. Hematological: Negative. Psychiatric/Behavioral: Negative. All other systems reviewed and are negative. Vitals:    11/02/23 1020   BP: 126/82     Physical Exam  Constitutional:       Appearance: Normal appearance. HENT:      Head: Atraumatic. Eyes:      Extraocular Movements: Extraocular movements intact. Pupils: Pupils are equal, round, and reactive to light. Abdominal:          Comments: Well-healed surgical incisions   Neurological:      Mental Status: She is alert.

## 2023-11-02 ENCOUNTER — OFFICE VISIT (OUTPATIENT)
Age: 53
End: 2023-11-02

## 2023-11-02 ENCOUNTER — TELEPHONE (OUTPATIENT)
Age: 53
End: 2023-11-02

## 2023-11-02 VITALS
DIASTOLIC BLOOD PRESSURE: 82 MMHG | HEIGHT: 57 IN | WEIGHT: 176 LBS | BODY MASS INDEX: 37.97 KG/M2 | SYSTOLIC BLOOD PRESSURE: 126 MMHG

## 2023-11-02 DIAGNOSIS — K56.609 LARGE BOWEL OBSTRUCTION (HCC): Primary | ICD-10-CM

## 2023-11-02 PROCEDURE — 99024 POSTOP FOLLOW-UP VISIT: CPT | Performed by: COLON & RECTAL SURGERY

## 2023-11-02 RX ORDER — DOXYCYCLINE 50 MG/1
50 TABLET ORAL 2 TIMES DAILY
COMMUNITY

## 2023-11-02 NOTE — LETTER
[address] [facility] [place] [address]      FLEXIBLE SIGMOIDOSCOPY WITH SEDATION INSTRUCTIONS  PLEASE NOTE…  AS OF JUNE 1, 2014, OUR OFFICE REQUIRES 50 HOURS NOTICE OF CANCELLATION/RESCHEDULE OF A PROCEDURE TO AVOID INCURRING A MISSED APPOINTMENT FEE. Your Sigmoidoscopy Procedure has been scheduled at:  [address] [facility]. The Date of your Procedure is:Monday, May 6, 2024  Patient is to have nothing to eat or drink after midnight. Also in preparation for this procedure, take 2 Fleet Enemas 2 hours prior to the appointment. These enemas can be purchased over the counter in most pharmacies. Follow the directions on the box. You are to report to the GI Lab ([facility] 1st [facility]) ONE (1) HOUR PRIOR to your procedure. Special instructions may be needed if you are taking aspirin or any aspirin-containing medication. Check with your family physician. Check with your family doctor if you are taking Coumadin (a blood thinner), Plavix, Gingko biloba, Ginseng, Feverfew, and/or Parmelee's Wort. We suggest stopping these for 3 days. If you are on DIABETIC MEDICATION (tablets or insulin) your doctor may make changes in your preparation. Take all medications usual unless otherwise instructed. As always, if you have any questions or concerns, feel free to call the office. Our  staff will be happy to connect you with one of the nurses to answer any questions or address any concerns you have regarding your procedure. Arrangements must be made for a responsible party to drive you home from the procedure. If you do not have a responsible family member or friend to drive you home, the procedure will not be done. [employer]ast or a taxi is not acceptable.   It is important you notify our office of any insurance changes prior to your procedure and, if necessary, supply us with referrals from your primary care physician.

## 2023-11-02 NOTE — ASSESSMENT & PLAN NOTE
Patient presents for follow-up of colostomy takedown following operation for large bowel obstruction secondary to diverticular stricture. She notes she is doing well. Bowel movements are loose but improving. Incisions are healing nicely. She has small amount of drainage from the inferior incision but this is improved. Staples were removed today. Patient may start to return to normal function. She may slowly increase her exercise tolerance as well as her lifting. Diet may be liberalized. Plan will be for her to return to work in another 6 weeks or so. She will call if any concerning symptoms. Otherwise plan for follow-up flexible sigmoidoscopy in 6 to 12 months.

## 2023-11-02 NOTE — TELEPHONE ENCOUNTER
11/2/23 Post op DB s/p colostomy closure, fc to review anastomosis, hx of diverticulitis and large bowel obstruction    5/6/24 [facility] GI- FS w/sedation DB instructions given to patient

## 2023-11-24 ENCOUNTER — TELEPHONE (OUTPATIENT)
Age: 53
End: 2023-11-24

## 2023-11-24 NOTE — TELEPHONE ENCOUNTER
Patients GI provider:  Dr. Vitor Torres    Number to return call: [phone]     Reason for call: Pt calling stating she was to be released back to work 12/11/2023 but she would like to return 12/3/2023. Patient would like a phone call back to further discuss. Office was attempted, but unavailable.     Scheduled procedure/appointment date if applicable: 1/1/2912 FLEX SIG DR Vitor Torres

## 2023-11-24 NOTE — LETTER
[address] [place] [address]        ------------------------------------------------------------------------------------------------------------            The above patient may return to work on 12/4/2024 with no restrictions. If there are any further questions you may call the above number.                 Thank you,    Mario Gabriel MD

## 2023-11-27 NOTE — TELEPHONE ENCOUNTER
Patients GI provider:  Dr. Srini Christine     Number to return call: [phone] email address [email or web address]     Reason for call: Pt calling stating she has not gotten a call back regarding her work note request of change from dec 4th to dec 11th. Patient would like a call back and email work note to above email provided.      Scheduled procedure/appointment date if applicable: Apt/procedure

## 2024-05-06 ENCOUNTER — HOSPITAL ENCOUNTER (OUTPATIENT)
Dept: GASTROENTEROLOGY | Facility: HOSPITAL | Age: 54
Setting detail: OUTPATIENT SURGERY
Discharge: HOME | End: 2024-05-06
Attending: COLON & RECTAL SURGERY | Admitting: COLON & RECTAL SURGERY
Payer: COMMERCIAL

## 2024-05-06 ENCOUNTER — ANESTHESIA (OUTPATIENT)
Dept: GASTROENTEROLOGY | Facility: HOSPITAL | Age: 54
End: 2024-05-06

## 2024-05-06 ENCOUNTER — ANESTHESIA EVENT (OUTPATIENT)
Dept: GASTROENTEROLOGY | Facility: HOSPITAL | Age: 54
End: 2024-05-06

## 2024-05-06 VITALS
SYSTOLIC BLOOD PRESSURE: 140 MMHG | RESPIRATION RATE: 15 BRPM | HEART RATE: 66 BPM | HEIGHT: 57 IN | BODY MASS INDEX: 40.03 KG/M2 | TEMPERATURE: 97.4 F | DIASTOLIC BLOOD PRESSURE: 70 MMHG | OXYGEN SATURATION: 99 %

## 2024-05-06 DIAGNOSIS — K56.609 LARGE BOWEL OBSTRUCTION (HCC): ICD-10-CM

## 2024-05-06 DIAGNOSIS — Z98.890 HISTORY OF COLOSTOMY REVERSAL: ICD-10-CM

## 2024-05-06 PROBLEM — E66.813 CLASS 3 SEVERE OBESITY DUE TO EXCESS CALORIES WITHOUT SERIOUS COMORBIDITY WITH BODY MASS INDEX (BMI) OF 40.0 TO 44.9 IN ADULT: Status: ACTIVE | Noted: 2019-06-19

## 2024-05-06 PROBLEM — E66.01 CLASS 3 SEVERE OBESITY DUE TO EXCESS CALORIES WITHOUT SERIOUS COMORBIDITY WITH BODY MASS INDEX (BMI) OF 40.0 TO 44.9 IN ADULT (HCC): Status: ACTIVE | Noted: 2019-06-19

## 2024-05-06 PROBLEM — Z72.0 TOBACCO USE: Status: ACTIVE | Noted: 2017-11-06

## 2024-05-06 PROCEDURE — 45330 DIAGNOSTIC SIGMOIDOSCOPY: CPT | Performed by: COLON & RECTAL SURGERY

## 2024-05-06 RX ORDER — SODIUM CHLORIDE, SODIUM LACTATE, POTASSIUM CHLORIDE, CALCIUM CHLORIDE 600; 310; 30; 20 MG/100ML; MG/100ML; MG/100ML; MG/100ML
INJECTION, SOLUTION INTRAVENOUS CONTINUOUS PRN
Status: DISCONTINUED | OUTPATIENT
Start: 2024-05-06 | End: 2024-05-06

## 2024-05-06 RX ORDER — PROPOFOL 10 MG/ML
INJECTION, EMULSION INTRAVENOUS AS NEEDED
Status: DISCONTINUED | OUTPATIENT
Start: 2024-05-06 | End: 2024-05-06

## 2024-05-06 RX ORDER — LIDOCAINE HYDROCHLORIDE 10 MG/ML
INJECTION, SOLUTION EPIDURAL; INFILTRATION; INTRACAUDAL; PERINEURAL AS NEEDED
Status: DISCONTINUED | OUTPATIENT
Start: 2024-05-06 | End: 2024-05-06

## 2024-05-06 RX ADMIN — PROPOFOL 20 MG: 10 INJECTION, EMULSION INTRAVENOUS at 08:26

## 2024-05-06 RX ADMIN — LIDOCAINE HYDROCHLORIDE 50 MG: 10 INJECTION, SOLUTION EPIDURAL; INFILTRATION; INTRACAUDAL; PERINEURAL at 08:23

## 2024-05-06 RX ADMIN — PROPOFOL 100 MG: 10 INJECTION, EMULSION INTRAVENOUS at 08:23

## 2024-05-06 RX ADMIN — SODIUM CHLORIDE, SODIUM LACTATE, POTASSIUM CHLORIDE, AND CALCIUM CHLORIDE: .6; .31; .03; .02 INJECTION, SOLUTION INTRAVENOUS at 08:18

## 2024-05-06 NOTE — ANESTHESIA PREPROCEDURE EVALUATION
Procedure:  FLEXIBLE SIGMOIDOSCOPY    Relevant Problems   CARDIO   (+) Essential hypertension      GI/HEPATIC   (+) Large bowel obstruction (HCC)      Behavioral Health   (+) Tobacco use      Surgery/Wound/Pain   (+) History of colostomy reversal   (+) Pain of upper abdomen      Other   (+) Class 3 severe obesity due to excess calories without serious comorbidity with body mass index (BMI) of 40.0 to 44.9 in adult (HCC)             Anesthesia Plan  ASA Score- 3     Anesthesia Type- IV sedation with anesthesia with ASA Monitors.         Additional Monitors:     Airway Plan:            Plan Factors-Exercise tolerance (METS): >4 METS.    Chart reviewed. EKG reviewed. Imaging results reviewed. Existing labs reviewed. Patient summary reviewed.                  Induction- intravenous.    Postoperative Plan-     Informed Consent-

## 2024-05-06 NOTE — ANESTHESIA POSTPROCEDURE EVALUATION
Post-Op Assessment Note    CV Status:  Stable  Pain Score: 0    Pain management: adequate       Mental Status:  Alert and awake   Hydration Status:  Stable   PONV Controlled:  Controlled   Airway Patency:  Patent  Two or more mitigation strategies used for obstructive sleep apnea   Post Op Vitals Reviewed: Yes    No anethesia notable event occurred.    Staff: CRNA               BP   113/67   Temp      Pulse 71   Resp 14   SpO2 97

## 2024-05-06 NOTE — H&P
History and Physical   Colon and Rectal Surgery   Elen Roblero 54 y.o. female MRN: [id number]  Unit/Bed#:  Encounter: [id number]  05/06/24   @NOW    No chief complaint on file.        History of Present Illness   HPI:  Elen Roblero is a 54 y.o. female who presents for follow-up of diverticulitis and large bowel obstruction status post resection and colostomy reversal.      Historical Information   Past Medical History:   Diagnosis Date    Diverticulitis     Diverticulosis     Fibroids     Hypertension      Past Surgical History:   Procedure Laterality Date    COLONOSCOPY      COLOSTOMY N/A 4/26/2023    Procedure: COLOSTOMY END;  Surgeon: Walker Sim MD;  [facility];  Service: Colorectal    SD LAPAROSCOPY COLECTOMY PARTIAL W/ANASTOMOSIS N/A 4/26/2023    Procedure: LAPAROSCOPY, EXTENSIVE LYSIS ADHESIONS, MOBILIZATION SPLENIC FLEXURE, RESECTION COLON SIGMOID LAPAROSCOPIC;  Surgeon: Walker Sim MD;  [facility];  Service: Colorectal    SD LAPS CLSR NTRSTM LG/SM INT W/RESCJ & ANASTOMOSIS N/A 10/18/2023    Procedure: CLOSURE COLOSTOMY, LAPAROSCOPIC;  Surgeon: Walker Sim MD;  [facility];  Service: Colorectal    SMALL INTESTINE SURGERY N/A 4/26/2023    Procedure: RESECTION SMALL BOWEL  X 2;  Surgeon: Walker Sim MD;  [facility];  Service: Colorectal    TUBAL LIGATION  1991       Meds/Allergies     (Not in a hospital admission)        Current Outpatient Medications:     amLODIPine (NORVASC) 10 mg tablet, Take 10 mg by mouth daily, Disp: , Rfl:     multivitamin (THERAGRAN) TABS, Take 1 tablet by mouth daily, Disp: , Rfl:     Rhubarb (ESTROVEN COMPLETE PO), Take by mouth, Disp: , Rfl:     hydrochlorothiazide (HYDRODIURIL) 25 mg tablet, Take 25 mg by mouth daily (Patient not taking: Reported on 11/2/2023), Disp: , Rfl:     No Known Allergies      Social History   Social History     Substance and Sexual Activity   Alcohol Use Yes     "Alcohol/week: 3.0 standard drinks of alcohol    Types: 3 Shots of liquor per week    Comment: on occassion     Social History     Substance and Sexual Activity   Drug Use Never     Social History     Tobacco Use   Smoking Status Every Day    Types: Cigars   Smokeless Tobacco Never         Family History:   Family History   Problem Relation Age of Onset    Diverticulitis Mother     No Known Problems Father          Objective     Current Vitals:   Blood Pressure: 142/83 (05/06/24 0745)  Pulse: 71 (05/06/24 0745)  Temperature: 97.6 °F (36.4 °C) (05/06/24 0745)  Temp Source: Temporal (05/06/24 0745)  Respirations: 20 (05/06/24 0745)  Height: 4' 9\" (144.8 cm) (05/06/24 0745)  SpO2: 100 % (05/06/24 0745)  No intake or output data in the 24 hours ending 05/06/24 0803    Physical Exam:  General:  Resting comfortably in bed   Eyes:Sclera anicteric  ENT: Trachea midline  Pulm:  Symmetric chest raise.  No respiratory Distress  CV:  Regular on monitor  Abdomen:  Soft NT ND  Extremities:  No clubbing/ cyanosis/ edema    Lab Results: I have personally reviewed pertinent lab results.    Imaging: I have personally reviewed pertinent reports.        ASSESSMENT:  Elen Roblero is a 54 y.o. female who presents for outpatient sigmoidoscopy      PLAN:  For sigmoidoscopy    Risks/ Benefits reviewed to include but not limited to anesthesia, bleeding, missed lesions, and colonoscopic perforation requiring surgery.      "

## 2025-04-16 ENCOUNTER — NURSE TRIAGE (OUTPATIENT)
Age: 55
End: 2025-04-16

## 2025-04-16 NOTE — TELEPHONE ENCOUNTER
"Pt calling to ask if she can take Mounjaro for wt loss.  PCP would prescribe but advised she check with [facility] due to surgical history.     Please advise.       Reason for Disposition   Caller has NON-URGENT medicine question about med that PCP or specialist prescribed and triager unable to answer question    Answer Assessment - Initial Assessment Questions  1. NAME of MEDICINE: \"What medicine(s) are you calling about?\"      Mounjaro  2. QUESTION: \"What is your question?\" (e.g., double dose of medicine, side effect)      Can she take Mounjaro with surgeries she has had?    Protocols used: Medication Question Call-Adult-[place]    "

## (undated) DEVICE — SUT PDS II 2-0 CT-1 27 IN Z339H

## (undated) DEVICE — ANTIBACTERIAL UNDYED BRAIDED (POLYGLACTIN 910), SYNTHETIC ABSORBABLE SUTURE: Brand: COATED VICRYL

## (undated) DEVICE — DRAPE SHEET THREE QUARTER

## (undated) DEVICE — SUT VICRYL 3-0 SH 27 IN J416H

## (undated) DEVICE — TRAY FOLEY 16FR URIMETER SURESTEP

## (undated) DEVICE — 40595 XL TRENDELENBURG POSITIONING KIT: Brand: 40595 XL TRENDELENBURG POSITIONING KIT

## (undated) DEVICE — ASTOUND STANDARD SURGICAL GOWN, XXL: Brand: CONVERTORS

## (undated) DEVICE — INTENDED FOR TISSUE SEPARATION, AND OTHER PROCEDURES THAT REQUIRE A SHARP SURGICAL BLADE TO PUNCTURE OR CUT.: Brand: BARD-PARKER SAFETY BLADES SIZE 15, STERILE

## (undated) DEVICE — INSUFLATION TUBING INSUFLOW (LEXION)

## (undated) DEVICE — Device: Brand: DEFENDO AIR/WATER/SUCTION AND BIOPSY VALVE

## (undated) DEVICE — PROXIMATE RELOADABLE LINEAR CUTTER WITH SAFETY LOCK-OUT, 100MM: Brand: PROXIMATE

## (undated) DEVICE — GLOVE INDICATOR PI UNDERGLOVE SZ 7.5 BLUE

## (undated) DEVICE — 3000CC GUARDIAN II: Brand: GUARDIAN

## (undated) DEVICE — PAD GROUNDING ADULT

## (undated) DEVICE — VEST COOLING PLUS SZ SNGL USE

## (undated) DEVICE — ECHELON CONTOUR GST RELOAD GREEN: Brand: ECHELON

## (undated) DEVICE — MAYO STAND COVER: Brand: CONVERTORS

## (undated) DEVICE — PLUMEPEN PRO 10FT

## (undated) DEVICE — ADHESIVE SKIN HIGH VISCOSITY EXOFIN 1ML

## (undated) DEVICE — SUT VICRYL PLUS 0 UR-6 27IN VCP603H

## (undated) DEVICE — ENSEAL LAPAROSCOPIC TISSUE SEALER G2 STRAIGHT JAW FOR USE WITH G2 GENERATOR 5MM DIAMETER 35CM SHAFT LENGTH: Brand: ENSEAL

## (undated) DEVICE — PACK PBDS STERILE LAP LITHOTOMY RF

## (undated) DEVICE — ACCESS PLATFORM FOR MINIMALLY INVASIVE SURGERY.: Brand: GELPORT® LAPAROSCOPIC  SYSTEM

## (undated) DEVICE — BETHLEHEM MAJOR GENERAL PACK: Brand: CARDINAL HEALTH

## (undated) DEVICE — SUT VICRYL 2-0 REEL 54 IN J286G

## (undated) DEVICE — SUT PDS PLUS 1 CTX 36IN PDP371T

## (undated) DEVICE — GOWN ,SIRUS ,NONREINFORCED 4XL: Brand: MEDLINE

## (undated) DEVICE — SUT VICRYL 0 REEL 54 IN J287G

## (undated) DEVICE — ADHESIVE REMOVER: Brand: UNI-SOLVE ADHESIVE REMOVER 8OZ US

## (undated) DEVICE — TROCAR: Brand: KII® SLEEVE

## (undated) DEVICE — SUCTION TIP SUCTION RETRACTOR: Brand: PRO

## (undated) DEVICE — 60 ML SYRINGE,REGULAR TIP: Brand: MONOJECT

## (undated) DEVICE — VISUALIZATION SYSTEM: Brand: CLEARIFY

## (undated) DEVICE — 3M™ IOBAN™ 2 ANTIMICROBIAL INCISE DRAPE 6650EZ: Brand: IOBAN™ 2

## (undated) DEVICE — IRRIG ENDO FLO TUBING

## (undated) DEVICE — TRAY FOLEY 16FR URIMETER SILICONE SURESTEP

## (undated) DEVICE — GLOVE SRG BIOGEL 7.5

## (undated) DEVICE — POOLE SUCTION HANDLE: Brand: CARDINAL HEALTH

## (undated) DEVICE — SUT PROLENE 2-0 KS 30 IN 8623H

## (undated) DEVICE — UNDER BUTTOCKS DRAPE W/FLUID CONTROL POUCH: Brand: CONVERTORS

## (undated) DEVICE — ELECTRODE BLADE MOD  E-Z CLEAN 6.5IN -0014M

## (undated) DEVICE — ECHELON CONTOUR W/ BLUE RELOAD: Brand: ECHELON

## (undated) DEVICE — ENSEAL X1 STRAIGHT 37CM SHAFT: Brand: HARMONIC

## (undated) DEVICE — STERILE EMESIS BASIN                 070: Brand: CARDINAL HEALTH

## (undated) DEVICE — METZENBAUM ADTEC SINGLE USE DISSECTING SCISSORS, SHAFT ONLY, MONOPOLAR, CURVED TO LEFT, WORKING LENGTH: 12 1/4", (310 MM), DIAM. 5 MM, INSULATED, DOUBLE ACTION, STERILE, DISPOSABLE, PACKAGE OF 10 PIECES: Brand: AESCULAP

## (undated) DEVICE — SYRINGE BULB 2 OZ

## (undated) DEVICE — SUT VICRYL 3-0 REEL 54 IN J285G

## (undated) DEVICE — SCD SEQUENTIAL COMPRESSION COMFORT SLEEVE MEDIUM KNEE LENGTH: Brand: KENDALL SCD

## (undated) DEVICE — ECHELON CIRCULAR POWERED STAPLER: Brand: ECHELON CIRCULAR

## (undated) DEVICE — TROCAR: Brand: KII FIOS FIRST ENTRY

## (undated) DEVICE — SUT PDS PLUS 4-0 SH 27IN PDP315H

## (undated) DEVICE — SUT MONOCRYL 4-0 PS-2 18 IN Y496G

## (undated) DEVICE — PROXIMATE LINEAR CUTTER RELOADS (STANDARD)-100MM: Brand: PROXIMATE

## (undated) DEVICE — ECHELON CONTOUR W/ GREEN RELOAD: Brand: ECHELON

## (undated) DEVICE — CO2 AND WATER TUBING/CAP SET FOR OLYMPUS® SCOPES & UCR: Brand: ERBE

## (undated) DEVICE — SUT VICRYL 3-0 SH C/R 18 IN J864D

## (undated) DEVICE — TOWEL SET X-RAY